# Patient Record
Sex: FEMALE | Race: WHITE | NOT HISPANIC OR LATINO | Employment: OTHER | URBAN - METROPOLITAN AREA
[De-identification: names, ages, dates, MRNs, and addresses within clinical notes are randomized per-mention and may not be internally consistent; named-entity substitution may affect disease eponyms.]

---

## 2018-08-17 DIAGNOSIS — D18.00 CAVERNOMA: Primary | ICD-10-CM

## 2018-08-24 ENCOUNTER — APPOINTMENT (OUTPATIENT)
Dept: LAB | Facility: CLINIC | Age: 63
End: 2018-08-24
Attending: NEUROLOGICAL SURGERY
Payer: COMMERCIAL

## 2018-08-24 DIAGNOSIS — D18.00 CAVERNOMA: ICD-10-CM

## 2018-08-24 LAB
BUN SERPL-MCNC: 8 MG/DL (ref 8–20)
CREAT SERPL-MCNC: 0.6 MG/DL (ref 0.6–1.1)
GFR SERPL CREATININE-BSD FRML MDRD: >60 ML/MIN/1.73M*2

## 2018-08-24 PROCEDURE — 82565 ASSAY OF CREATININE: CPT

## 2018-08-24 PROCEDURE — 36415 COLL VENOUS BLD VENIPUNCTURE: CPT

## 2018-08-24 PROCEDURE — 84520 ASSAY OF UREA NITROGEN: CPT

## 2018-08-28 ENCOUNTER — HOSPITAL ENCOUNTER (OUTPATIENT)
Dept: RADIOLOGY | Facility: CLINIC | Age: 63
Discharge: HOME | End: 2018-08-28
Attending: NEUROLOGICAL SURGERY
Payer: COMMERCIAL

## 2018-08-28 DIAGNOSIS — D18.00 CAVERNOMA: ICD-10-CM

## 2018-08-28 RX ORDER — GADOTERATE MEGLUMINE 376.9 MG/ML
10 INJECTION INTRAVENOUS ONCE
Status: COMPLETED | OUTPATIENT
Start: 2018-08-28 | End: 2018-08-28

## 2018-08-28 RX ADMIN — GADOTERATE MEGLUMINE 10 ML: 376.9 INJECTION INTRAVENOUS at 10:45

## 2018-08-30 ENCOUNTER — TELEPHONE (OUTPATIENT)
Dept: NEUROSURGERY | Facility: CLINIC | Age: 63
End: 2018-08-30

## 2018-08-30 DIAGNOSIS — D18.00 CAVERNOMA: Primary | ICD-10-CM

## 2018-08-30 NOTE — TELEPHONE ENCOUNTER
MRI brain with and without contrast reviewed with Dr. 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  Plan is to repeat MRI with and without contrast in 2 years (September 2020).  Called patient to discuss the results and plan, patient is aware and agreeable.  All questions answered.

## 2019-10-08 ENCOUNTER — PROCEDURE VISIT (OUTPATIENT)
Dept: OTOLARYNGOLOGY | Facility: CLINIC | Age: 64
End: 2019-10-08
Payer: COMMERCIAL

## 2019-10-08 ENCOUNTER — OFFICE VISIT (OUTPATIENT)
Dept: OTOLARYNGOLOGY | Facility: CLINIC | Age: 64
End: 2019-10-08
Payer: COMMERCIAL

## 2019-10-08 VITALS
SYSTOLIC BLOOD PRESSURE: 102 MMHG | WEIGHT: 103 LBS | HEART RATE: 67 BPM | HEIGHT: 66 IN | BODY MASS INDEX: 16.55 KG/M2 | OXYGEN SATURATION: 99 % | DIASTOLIC BLOOD PRESSURE: 68 MMHG

## 2019-10-08 DIAGNOSIS — H69.93 DYSFUNCTION OF BOTH EUSTACHIAN TUBES: Primary | ICD-10-CM

## 2019-10-08 DIAGNOSIS — H93.13 TINNITUS OF BOTH EARS: ICD-10-CM

## 2019-10-08 DIAGNOSIS — H90.3 SENSORINEURAL HEARING LOSS (SNHL) OF BOTH EARS: ICD-10-CM

## 2019-10-08 DIAGNOSIS — H93.13 SUBJECTIVE TINNITUS OF BOTH EARS: ICD-10-CM

## 2019-10-08 DIAGNOSIS — H90.3 SENSORINEURAL HEARING LOSS (SNHL) OF BOTH EARS: Primary | ICD-10-CM

## 2019-10-08 PROBLEM — J30.1 ACUTE SEASONAL ALLERGIC RHINITIS DUE TO POLLEN: Status: ACTIVE | Noted: 2017-09-08

## 2019-10-08 PROBLEM — K08.89 PAIN, DENTAL: Status: ACTIVE | Noted: 2017-09-08

## 2019-10-08 PROBLEM — R51.9 FACIAL PAIN, ACUTE: Status: RESOLVED | Noted: 2017-09-08 | Resolved: 2019-10-08

## 2019-10-08 PROBLEM — J32.0 CHRONIC MAXILLARY SINUSITIS: Status: ACTIVE | Noted: 2017-09-08

## 2019-10-08 PROBLEM — R51.9 FACIAL PAIN, ACUTE: Status: ACTIVE | Noted: 2017-09-08

## 2019-10-08 PROCEDURE — 99999 PR OFFICE/OUTPT VISIT,PROCEDURE ONLY: CPT | Performed by: AUDIOLOGIST

## 2019-10-08 PROCEDURE — 92567 TYMPANOMETRY: CPT | Performed by: AUDIOLOGIST

## 2019-10-08 PROCEDURE — 99213 OFFICE O/P EST LOW 20 MIN: CPT | Mod: 25 | Performed by: OTOLARYNGOLOGY

## 2019-10-08 PROCEDURE — 92557 COMPREHENSIVE HEARING TEST: CPT | Performed by: AUDIOLOGIST

## 2019-10-08 RX ORDER — AZELASTINE 1 MG/ML
1 SPRAY, METERED NASAL DAILY
Qty: 30 ML | Refills: 5 | Status: SHIPPED | OUTPATIENT
Start: 2019-10-08 | End: 2020-07-06

## 2019-10-08 ASSESSMENT — ENCOUNTER SYMPTOMS
SLEEP DISTURBANCE: 0
NERVOUS/ANXIOUS: 0
SINUS PRESSURE: 0
FEVER: 0
ADENOPATHY: 0
DYSPHORIC MOOD: 0
ARTHRALGIAS: 0
DIARRHEA: 0
CONSTIPATION: 0
TROUBLE SWALLOWING: 0
COUGH: 0
SHORTNESS OF BREATH: 0
NAUSEA: 0
MYALGIAS: 0
RHINORRHEA: 0
VOMITING: 0
BACK PAIN: 0
FREQUENCY: 0
DIZZINESS: 0
HEADACHES: 0
FATIGUE: 0
WHEEZING: 0
WEAKNESS: 0
POLYPHAGIA: 0
PALPITATIONS: 0
VOICE CHANGE: 0

## 2019-10-08 NOTE — PROGRESS NOTES
[facility]  [address], [address]  [place] [address]  Phone: [phone]  Fax: [phone]      Patient ID: Nohemy Machuca                              : 1955    Visit Date: 10/8/2019    Referring Provider: Paris Corcoran MD    Chief Complaint: Ear Fullness      Nohemy Machuca returned to the [place] office today in regard to fullness in the ears with associated tinnitus.    Nohemy has had aural fullness bilaterally on an intermittent basis for the past 6 months.  She has been involved in extensive dental work sequential placement of implants the entire oral cavity over the past 6 years.  Her bite has been moved significantly and she has manifested tension around and about the temporomandibular joints during this time.  The mid face has been pushed forward and this is obvious even in her appearance.  2 years ago she saw Dr. Darryl Lala who placed her on Elavil to help with severe tension at the temporomandibular joints caused by these dental interventions.  That medication worked and she no longer takes it.    Nohemy does have low-grade tinnitus along with the fullness in the ears.  She has had minimal postnasal drainage this fall.  She has not had an upper respiratory infection.  She did not have any trouble with airplane flights over the summer.    Review of Systems   Constitutional: Negative for fatigue and fever.   HENT: Positive for postnasal drip and tinnitus. Negative for congestion, hearing loss, nosebleeds, rhinorrhea, sinus pressure, trouble swallowing and voice change.         Facial pain.  Ear fullness.   Eyes: Negative for visual disturbance.   Respiratory: Negative for cough, shortness of breath and wheezing.    Cardiovascular: Negative for chest pain and palpitations.   Gastrointestinal: Negative for constipation, diarrhea, nausea and vomiting.   Endocrine: Negative for polyphagia and polyuria.   Genitourinary: Negative for frequency.   Musculoskeletal: Negative for  "arthralgias, back pain, gait problem and myalgias.   Skin: Negative for rash.   Allergic/Immunologic: Negative for environmental allergies.   Neurological: Negative for dizziness, weakness and headaches.   Hematological: Negative for adenopathy.   Psychiatric/Behavioral: Negative for dysphoric mood and sleep disturbance. The patient is not nervous/anxious.      Current Medications:Multivitamin.    Physical Exam:  Vitals: /68 (BP Location: Left upper arm, Patient Position: Sitting)   Pulse 67   Ht 1.664 m (5' 5.5\")   Wt 46.7 kg (103 lb)   SpO2 99%   BMI 16.88 kg/m²   General:  Well-developed but cachectic 64 y.o. who is very anxious.  Voice: Normal without hoarseness, breathiness or stridor.  Head/face:  No scars or lesions.  No parotid masses. No presinus tenderness. Seventh cranial nerves intact.  Eyes:  Extraocular movements and gaze alignment normal.  Ears: Auricles normal.  External auditory canals free of cerumen.  Tympanic membranes clear, mobile and without retraction or scar.  Nose:  Dorsum straight.  Septum sinusoidal.  Turbinates pale purple but normal in size and orientation.  No pus, polyps or crusts.  Oral Cavity/oropharynx: Normal tongue thrust. Normal gag reflex. No masses, leukoplakia, erythroplakia, ulcers or other abnormalities at the tongue, floor of mouth, buccal mucosa or palate.  Cobblestoning at the posterior pharyngeal wall.  No audible or palpable crepitus.  Tense across the TMJs.  Larynx/hypopharynx:  Normal supraglottis.  Vocal folds smooth and white.  Arytenoids sharp and mobile.  Interarytenoid and post-glottic mucosa normal.  No masses at the base of tongue, vallecula or piriform sinuses.  Neck:  No masses, adenopathy, cervical spasm or thyroid enlargement.  Cranial Nerves II through XII: Grossly intact.  Mental status: Awake, alert and oriented ×3.  No anxiety or depression.    Audiogram: Bilateral mild sensorineural hearing loss worse on the left than the right above 3000 " Hz.  Discrimination scores: 96% at 60 dB bilaterally.    Tympanogram: Normal bilaterally.    Impression:  1.  Bilateral eustachian tube dysfunction most likely relational to tension through the muscles of the temporomandibular joints.  The patient has gone through extensive dental work over the past 6 years.  2.  Bilateral high-frequency sensorineural hearing loss with associated tinnitus, made worse by eustachian tube irritability.  3.  Allergic rhinitis.  No evidence of sinusitis on previous CT scans and most recent MRI of the head.    Recommendations and Plan:  1.  Simply Saline nasal mist squirts to each nostril twice daily.  2.  Astelin, 1 squirt to each nostril each evening for 1 month.  This is an antihistamine that treats fall allergies and can also help with the eustachian tubes.  3.  When the ears feel full, massage the muscles under the the ears in the upper neck.  You can also use heat in this location and it will be helpful.  4.  Follow-up in 1 year or sooner as needed.        Gala Starkey MD

## 2019-10-08 NOTE — PATIENT INSTRUCTIONS
1.  Simply Saline nasal mist squirts to each nostril twice daily.  2.  Astelin, 1 squirt to each nostril each evening for 1 month.  This is an antihistamine that treats fall allergies and can also help with the eustachian tube.  3.  When the ears feel full, massage the muscles under the the ears in the upper neck.  You can also use heat in this location and it will be helpful.  4.  Follow-up in 1 year.

## 2019-11-08 ENCOUNTER — OFFICE VISIT (OUTPATIENT)
Dept: PRIMARY CARE | Facility: CLINIC | Age: 64
End: 2019-11-08
Payer: COMMERCIAL

## 2019-11-08 VITALS
HEIGHT: 65 IN | OXYGEN SATURATION: 99 % | BODY MASS INDEX: 17.33 KG/M2 | DIASTOLIC BLOOD PRESSURE: 70 MMHG | WEIGHT: 104 LBS | SYSTOLIC BLOOD PRESSURE: 98 MMHG | HEART RATE: 58 BPM | TEMPERATURE: 97.7 F

## 2019-11-08 DIAGNOSIS — Z12.11 SCREENING FOR COLORECTAL CANCER: ICD-10-CM

## 2019-11-08 DIAGNOSIS — H69.93 DYSFUNCTION OF BOTH EUSTACHIAN TUBES: ICD-10-CM

## 2019-11-08 DIAGNOSIS — G50.0 TRIGEMINAL NEURALGIA: ICD-10-CM

## 2019-11-08 DIAGNOSIS — L80 VITILIGO: ICD-10-CM

## 2019-11-08 DIAGNOSIS — Q28.3 CEREBRAL CAVERNOMA: ICD-10-CM

## 2019-11-08 DIAGNOSIS — Z00.00 ROUTINE GENERAL MEDICAL EXAMINATION AT A HEALTH CARE FACILITY: Primary | ICD-10-CM

## 2019-11-08 DIAGNOSIS — Z12.39 SCREENING FOR BREAST CANCER: ICD-10-CM

## 2019-11-08 DIAGNOSIS — H90.3 SENSORINEURAL HEARING LOSS (SNHL) OF BOTH EARS: ICD-10-CM

## 2019-11-08 DIAGNOSIS — Z12.12 SCREENING FOR COLORECTAL CANCER: ICD-10-CM

## 2019-11-08 DIAGNOSIS — M81.0 OSTEOPOROSIS WITHOUT CURRENT PATHOLOGICAL FRACTURE, UNSPECIFIED OSTEOPOROSIS TYPE: ICD-10-CM

## 2019-11-08 PROCEDURE — 90471 IMMUNIZATION ADMIN: CPT | Performed by: FAMILY MEDICINE

## 2019-11-08 PROCEDURE — 99396 PREV VISIT EST AGE 40-64: CPT | Mod: 25 | Performed by: FAMILY MEDICINE

## 2019-11-08 PROCEDURE — 90686 IIV4 VACC NO PRSV 0.5 ML IM: CPT | Performed by: FAMILY MEDICINE

## 2019-11-08 RX ORDER — CYCLOBENZAPRINE HCL 5 MG
5 TABLET ORAL 2 TIMES DAILY
Refills: 0 | COMMUNITY
Start: 2019-10-10 | End: 2020-12-22

## 2019-11-08 SDOH — HEALTH STABILITY: MENTAL HEALTH: HOW OFTEN DO YOU HAVE A DRINK CONTAINING ALCOHOL?: MONTHLY OR LESS

## 2019-11-08 NOTE — PROGRESS NOTES
"            OFFICE VISIT NOTE NEW    FEI PRECIADO DO        PATIENT NAME:Nohemy Machuca  PATIENT : 1955  AMANDA: 2019    CC:   Chief Complaint   Patient presents with   • Annual Exam     referral for mammo and labs     \"annual physical new\"         HPI   Nohemy Machuca is a 64 y.o. female  Working as a  [employer] living with  with incidental cerebral cavernoma, trigeminal neuralgia, osteoporosis presenting to review chronic conditions and here for annual physical evaluation.    #annual physical    HEALTH MAINTENANCE    -- Pneumonia Immunization: not DUE  -- Influenza Immunization: DUE  -- Shingles Immunization:shingles  -- Colorectal cancer screening:dr oleary   q 3 years? Due 2020  -- Breast Cancer screening: mammo  18 aubrey negative   -- Cervical cancer screening: DUE normal  q3-5 years  6/15/18   -- Bone Health screenin2017 +osteoporosis  -- Vision Screening:intact normal   -- Hearing Screening: ent hearing loss       #osteoporosis  DEXA 2017 +osteoporosis  +fam hx mother and M grandmother  Taking MVI  Taking vit D3 1000  Weight bearnig exercises- lifting and walking   + fracture left hand from shoveling     #cavernoma left thalamas, cerebral    found on MRI  For admission for trigeminal neurlagia   followup adelina borja  q2 years MRI w and WO  Thought to be 2/2 mild trauma young no overt issues now at this time. surveillance    #trigeminal neuralgia   on elavil 25 in past  Seen by neuro     #bl ETD and TMJ tensions  #bl high freq sensorineural hearing loss -- tinnitus   - seeing ent astelin nasal spray for allergic rhinitis      #vitiligo- chronic, father. Not seenig derm     SOCIAL HISTORY:  Denies etoh, drug, tobacco      FUNCTIONAL HISTORY:  ADL   Feeding-I  Toileting-I  Dressing-I  Bathing-I  Transferring-I    IADL  Medications-I  Shopping-I  Finances-I  Cooking-I  Cleaning-I        ADVANCED CARE PLANNING:  poa is     FULL " CODE    DEPRESSION/MOOD:  GEOVANNA:denies  PHQ9:denies  Mother  age 62 suicide       The following have been reviewed and updated as appropriate in this visit: active problem list, medication list, allergies, family history, social history, health maintenance            Past Medical History:   Diagnosis Date   • Osteoporosis        Past Surgical History:   Procedure Laterality Date   • BUNIONECTOMY Bilateral    • TONSILLECTOMY       Social History     Socioeconomic History   • Marital status:      Spouse name: Not on file   • Number of children: Not on file   • Years of education: Not on file   • Highest education level: Not on file   Occupational History   • Not on file   Social Needs   • Financial resource strain: Not on file   • Food insecurity:     Worry: Not on file     Inability: Not on file   • Transportation needs:     Medical: Not on file     Non-medical: Not on file   Tobacco Use   • Smoking status: Never Smoker   • Smokeless tobacco: Never Used   Substance and Sexual Activity   • Alcohol use: Yes     Frequency: Monthly or less     Comment: occ wine 3x per year   • Drug use: Not on file   • Sexual activity: Not on file   Lifestyle   • Physical activity:     Days per week: Not on file     Minutes per session: Not on file   • Stress: Not on file   Relationships   • Social connections:     Talks on phone: Not on file     Gets together: Not on file     Attends Christian service: Not on file     Active member of club or organization: Not on file     Attends meetings of clubs or organizations: Not on file     Relationship status: Not on file   • Intimate partner violence:     Fear of current or ex partner: Not on file     Emotionally abused: Not on file     Physically abused: Not on file     Forced sexual activity: Not on file   Other Topics Concern   • Not on file   Social History Narrative   • Not on file         Family History   Problem Relation Age of Onset   • Diverticulitis Biological Mother    •  "Glaucoma Biological Father          Allergies   Allergen Reactions   • No Known Allergies          Current Outpatient Medications   Medication Sig Dispense Refill   • azelastine (ASTELIN) 137 mcg (0.1 %) nasal spray Administer 1 spray into each nostril daily. 30 mL 5   • cyclobenzaprine (FLEXERIL) 5 mg tablet Take 5 mg by mouth 2 (two) times a day.  0   • multivitamin tablet Take by mouth daily.       No current facility-administered medications for this visit.        Review of Systems  ROS  See hpi.   General: Denies fatigue, weight loss or weight gain.    Head: Denies headaches trauma   Eyes: Denies blurry vision, diplopia, decreased vision.  Denies drainage or excessive tearing.  Ears: Denies tinnitis, decreased hearing, ear drainage  Nose: Denies rhinorrhea, epistaxi  Mouth: Denies dry mouth  Neck: Denies lumps, bumps.  Denies dysphagia, odynophagia  Pulmonary:  Denies shortness of breath, difficulty breathing, excessive drooling, change in sputum.   Cardiac: Denies chest pain, flip-flop sensation   GI/Abdomen: Denies vomit, nausea, diarrhea, constipation, hematochezia.  Denies abdominal pain.    Uro/: Denies hematuria, urgency, frequency, burning sensation with urination. Denies discharge.   Skin: Denies lumps, bumps, rash.   MSK:  Denies weakness, numbness, tingling.    Neuro: Denies confusion, vertigo.              VITALS  Vitals:    11/08/19 0814   BP: 98/70   Pulse: (!) 58   Temp: 36.5 °C (97.7 °F)   SpO2: 99%             Wt Readings from Last 3 Encounters:   11/08/19 47.2 kg (104 lb)   10/08/19 46.7 kg (103 lb)   08/28/18 50.8 kg (112 lb)       Ht Readings from Last 3 Encounters:   11/08/19 1.651 m (5' 5\")   10/08/19 1.664 m (5' 5.5\")       BP Readings from Last 3 Encounters:   11/08/19 98/70   10/08/19 102/68       Physical Exam  PHYSICAL EXAM  General: Appears well, in no distress. Pt is pleasant. Hygiene normal. Thin frame  Head: NC/AT.   Eyes: Conjunctiva and sclera normal bilaterally. No lid-lag.  " PERRL. EOMI.   Ears: No tenderness of external ears bilterally.  Tympanic membrane Intact bilaterally.  No cerumen impaction. No erythema of canals bilaterally.   Nose: Inferior turbinates normal bilaterally.   Mouth: Oral mucosa pink and moist.No erythema or edema of oral pharynx. No tonsillar exudates or hypertrophy. Uvula midline and without swelling.   Neck: Inspection normal. Trachea midline. supple, FROM without pain. No cervical lymphadenopathy.  No enlargement of thyroid.  Cardiac: regular, rate, and rhythm. No murmurs, rubs, or gallops.  Lungs: negative for respiratory distress with normal respiratory effort. Lungs clear to auscultation bilaterally. No wheezes, rales, or rhonchi. No intercostal retractions  Abdomen: +BS. Bowel sounds normal. Abdomen is soft, non-distended. No tenderness. No masses or organomegaly. No guarding.   Skin: warm and dry. No erythema or rash.  Neuro: CNII-XII intact.   Extremities: No peripheral edema or extremity lymphadenopathy  MSK: 5/5 UE and LE b/l. Gait stable and intact. Sitting Balance stable.   Psych: linear. Normal mood and affect.  No SI/HI. AAOX3.               PERTINENT LABS, IMAGING    No new labs.     Lab Results   Component Value Date    WBC 4.72 09/18/2017    HGB 13.7 09/18/2017    HCT 38.6 09/18/2017    MCV 93.9 09/18/2017     09/18/2017         Chemistry        Component Value Date/Time     (L) 09/18/2017 0532    K 4.5 09/18/2017 0532    CL 98 09/18/2017 0532    CO2 26 09/18/2017 0532    BUN 8 08/24/2018 1035    CREATININE 0.6 08/24/2018 1035        Component Value Date/Time    CALCIUM 9.5 09/18/2017 0532    ALKPHOS 51 09/17/2017 1206    AST 31 09/17/2017 1206    ALT 26 09/17/2017 1206    BILITOT 0.6 09/17/2017 1206            Lab Results   Component Value Date    CHOL 209 (H) 09/18/2017    CHOL 193 03/16/2017     Lab Results   Component Value Date    HDL 81 09/18/2017    HDL 76 03/16/2017     Lab Results   Component Value Date    LDLCALC 117 (H)  09/18/2017    LDLCALC 99 03/16/2017     Lab Results   Component Value Date    TRIG 54 09/18/2017    TRIG 91 03/16/2017     No results found for: CHOLHDL    Lab Results   Component Value Date    TSH 1.69 09/17/2017       No results found for: HGBA1C    No results found for: HAV, HEPAIGM, HEPBIGM, HEPBCAB, HBEAG, HEPCAB    No results found for: MICROALBUR, [id number]    No results found for this or any previous visit (from the past 24 hour(s)).    No results found.            Immunization History   Administered Date(s) Administered   • Influenza Vaccine Quadrivalent 3 Yr And Older 11/01/2016   • Influenza, Unspecified 11/01/2016, 11/15/2016   • Tdap 08/26/2015         Health Maintenance   Topic Date Due   • HIV Screening  08/11/1970   • Cervical Cancer Screening  08/11/1978   • Zoster Vaccine (1 of 2) 08/11/2007   • Colonoscopy  08/11/2007   • Mammogram  12/28/2017   • Influenza Vaccine (1) 08/01/2019   • DTaP, Tdap, and Td Vaccines (2 - Td) 08/26/2025   • Hepatitis C Screening  Completed   • Meningococcal ACWY  Aged Out   • Varicella Vaccines  Aged Out   • HIB Vaccines  Aged Out   • IPV Vaccines  Aged Out   • HPV Vaccines  Aged Out   • Pneumococcal  Aged Out            Diagnosis Plan   1. Routine general medical examination at a health care facility  Influenza vaccine quadrivalent preservative free 6 mon and older IM    Hemoglobin A1c    Comprehensive metabolic panel    CBC and Differential    Lipid panel    TSH 3rd Generation    Vitamin D 25 hydroxy    DEXA BONE DENSITY    Hemoglobin A1c    Comprehensive metabolic panel    CBC and Differential    Lipid panel    TSH 3rd Generation    Vitamin D 25 hydroxy   2. Osteoporosis without current pathological fracture, unspecified osteoporosis type  DEXA BONE DENSITY   3. Trigeminal neuralgia     4. Dysfunction of both eustachian tubes     5. Sensorineural hearing loss (SNHL) of both ears     6. Cerebral cavernoma     7. Vitiligo     8. Screening for breast cancer  BI  SCREENING MAMMOGRAM BILATERAL   9. Screening for colorectal cancer     10. BMI less than 19,adult           Nohemy Machuca is a 64 y.o. female  Working as a  [employer] living with  with incidental cerebral cavernoma, trigeminal neuralgia, osteoporosis presenting to review chronic conditions and here for annual physical evaluation.    #annual physical--stable return in 1 year  #BMI < 18   reviewed bmi goals and increased protein intake  Routine labs ordered   phq9-0  Sorin-7-0      HEALTH MAINTENANCE    -- Pneumonia Immunization: not DUE  -- Influenza Immunization: give today  -- Shingles Immunization:shingles  -- Colorectal cancer screening:dr oleary   q 3 years? Due 2020  -- Breast Cancer screening: mammo  18 aubrey n-egative / script given  -- Cervical cancer screening: DUE normal  q3-5 years  6/15/18 . See gyn across due   -- Bone Health screenin2017 +osteoporosis  / script given  -- Vision Screening:intact normal   -- Hearing Screening: ent hearing loss       #osteoporosis  DEXA 2017 +osteoporosis  +fam hx mother and M grandmother   + fracture left hand from shoveling   Taking MVI  Taking vit D3 1000  Ed and counseling and reviewed needed amounts of ca and vit D handout given to look at diet intake  Weight bearnig exercises- lifting and walking  Significant dental work and may need to see rheum or endo for osteoporosis management  dexa script given    #cavernoma left thalamas, cerebral    found on MRI  For admission for trigeminal neurlagia   followup adelina borja  q2 years MRI w and WO  Thought to be 2/2 mild trauma young no overt issues now at this time. surveillance    #trigeminal neuralgia   on elavil 25 in past  Seen by neuro   stable    #bl ETD and TMJ tensions  #bl high freq sensorineural hearing loss -- tinnitus   - seeing ent astelin nasal spray for allergic rhinitis  - she is not taking nasal spray regularly      #vitiligo- chronic, father. Not  seenig derm     CTM      Return in about 1 year (around 11/8/2020) for Followup with Dr. Ginger Gonzales.    Ginger Gonzales DO

## 2019-11-22 ENCOUNTER — TELEPHONE (OUTPATIENT)
Dept: PRIMARY CARE | Facility: CLINIC | Age: 64
End: 2019-11-22

## 2020-01-03 ENCOUNTER — TELEPHONE (OUTPATIENT)
Dept: NEUROSURGERY | Facility: CLINIC | Age: 65
End: 2020-01-03

## 2020-01-03 ENCOUNTER — TELEPHONE (OUTPATIENT)
Dept: OTOLARYNGOLOGY | Facility: CLINIC | Age: 65
End: 2020-01-03

## 2020-01-03 NOTE — TELEPHONE ENCOUNTER
Pt dentist (Dr.Mariana Gee [facility] ) that she get a CT scan on a consistent basis..Pt. States you and her spoke about this and wanted to know if you could write a statement for her letting the dentist know that it is not neccessary ? Please advise

## 2020-01-03 NOTE — TELEPHONE ENCOUNTER
Pt called stated that her dentist is pushing her for a CBCT scan and she does not want to get that done. She would like a letter stating that she does not need it done due to the radiation. She would like a call back to:    Nohemy Machuca ([facility]) [phone] (M)

## 2020-01-03 NOTE — TELEPHONE ENCOUNTER
We can not provide a letter regarding this. She should follow up with her PCP. Please let her know.

## 2020-01-03 NOTE — TELEPHONE ENCOUNTER
"I spoke to the patient directly.  Her dentist wants to actually continue aggressive dental work and do more of the dental CT scans.  I cannot comment on the necessity of these procedures.      The patient is upset because she says that her dentist \"runs rough shod over her and does not listen to her\" and describes herself as 'under the control of her dentist'.  I told her that perhaps the best thing to do would be to get a second dental opinion before undergoing more surgery.  I referred her to Dr. Abdiaziz Camara's group in [place].  "

## 2020-01-22 ENCOUNTER — HOSPITAL ENCOUNTER (OUTPATIENT)
Dept: RADIOLOGY | Age: 65
Discharge: HOME | End: 2020-01-22
Attending: FAMILY MEDICINE
Payer: COMMERCIAL

## 2020-01-22 DIAGNOSIS — Z12.39 SCREENING FOR BREAST CANCER: ICD-10-CM

## 2020-01-22 PROCEDURE — 77067 SCR MAMMO BI INCL CAD: CPT

## 2020-01-22 PROCEDURE — 77063 BREAST TOMOSYNTHESIS BI: CPT

## 2020-01-23 NOTE — RESULT ENCOUNTER NOTE
Results Reviewed:  Results in acceptable range.please let patient know that mammogram is benign and normal. followup mammo in 1 year

## 2020-01-30 ENCOUNTER — TELEPHONE (OUTPATIENT)
Dept: PRIMARY CARE | Facility: CLINIC | Age: 65
End: 2020-01-30

## 2020-01-30 NOTE — TELEPHONE ENCOUNTER
----- Message from Ginger Gonzales DO sent at 1/23/2020 12:41 PM EST -----  Results Reviewed:  Results in acceptable range.please let patient know that mammogram is benign and normal. followup mammo in 1 year

## 2020-07-06 ENCOUNTER — TELEPHONE (OUTPATIENT)
Dept: NEUROSURGERY | Facility: CLINIC | Age: 65
End: 2020-07-06

## 2020-07-06 RX ORDER — LORAZEPAM 0.5 MG/1
TABLET ORAL
Qty: 2 TABLET | Refills: 0 | Status: SHIPPED | OUTPATIENT
Start: 2020-07-06 | End: 2020-07-06

## 2020-07-06 NOTE — TELEPHONE ENCOUNTER
Spoke to patient and she explains that yesterday she began to have a a right sided headache, 3/10. She denies any other neurological symptoms including weakness, numbness, vision changes or difficulty with speech. I explained to her that she can get her MRI now but should do it as soon as possible. In the meantime, if her headache does not resolve, becomes worse or if she develops any other neurological symptoms she should come to the ED. She is calling now to schedule the MRI and I will follow up to make sure it is scheduled appropriately.

## 2020-07-06 NOTE — TELEPHONE ENCOUNTER
Pt calling bc the back of her head is hurting where she had cavernoma.  She wants to get MRI now rather than wait till August.  She is asking for a script for anxiety to undergo MRI be mailed to her home address.

## 2020-07-10 ENCOUNTER — HOSPITAL ENCOUNTER (OUTPATIENT)
Dept: RADIOLOGY | Facility: HOSPITAL | Age: 65
Discharge: HOME | End: 2020-07-10
Attending: PHYSICIAN ASSISTANT
Payer: COMMERCIAL

## 2020-07-10 DIAGNOSIS — D18.00 CAVERNOMA: ICD-10-CM

## 2020-07-10 PROCEDURE — A9585 GADOBUTROL INJECTION: HCPCS | Mod: JW | Performed by: PHYSICIAN ASSISTANT

## 2020-07-10 PROCEDURE — 70553 MRI BRAIN STEM W/O & W/DYE: CPT

## 2020-07-10 RX ORDER — GADOBUTROL 604.72 MG/ML
0.1 INJECTION INTRAVENOUS ONCE
Status: COMPLETED | OUTPATIENT
Start: 2020-07-10 | End: 2020-07-10

## 2020-07-10 RX ADMIN — GADOBUTROL 5 MMOL: 604.72 INJECTION INTRAVENOUS at 08:34

## 2020-07-13 ENCOUNTER — TELEPHONE (OUTPATIENT)
Dept: NEUROSURGERY | Facility: CLINIC | Age: 65
End: 2020-07-13

## 2020-07-13 DIAGNOSIS — D18.00 CAVERNOMA: Primary | ICD-10-CM

## 2020-07-13 NOTE — TELEPHONE ENCOUNTER
Went over her MRI with her. I let her know I would have Dr. Scales review tomorrow and get back to her with recommendations. She reports her headache is much improved.

## 2020-07-15 NOTE — TELEPHONE ENCOUNTER
Left message to call our office back.        (Dr. Scales reviewed their brain MRI which showed stability of the cavernomas.  We will plan for a brain MRI without in 2 years)

## 2020-07-16 NOTE — TELEPHONE ENCOUNTER
Dr. Scales reviewed their brain MRI which showed stability of the cavernomas.  We will plan for a brain MRI without in 2 years. Patient aware and order placed and mailed.

## 2020-09-08 ENCOUNTER — TELEPHONE (OUTPATIENT)
Dept: PRIMARY CARE | Facility: CLINIC | Age: 65
End: 2020-09-08

## 2020-09-08 NOTE — TELEPHONE ENCOUNTER
Pt does not want to schedule an apt but would like a call back from the dr with a recommendation on who she could see to have a cyst on her left wrist removed

## 2020-09-18 ENCOUNTER — TRANSCRIBE ORDERS (OUTPATIENT)
Dept: REGISTRATION | Facility: HOSPITAL | Age: 65
End: 2020-09-18

## 2020-09-18 ENCOUNTER — APPOINTMENT (OUTPATIENT)
Dept: LAB | Facility: HOSPITAL | Age: 65
End: 2020-09-18
Attending: ORTHOPAEDIC SURGERY
Payer: COMMERCIAL

## 2020-09-18 ENCOUNTER — HOSPITAL ENCOUNTER (OUTPATIENT)
Dept: CARDIOLOGY | Facility: HOSPITAL | Age: 65
Discharge: HOME | End: 2020-09-18
Attending: ORTHOPAEDIC SURGERY
Payer: COMMERCIAL

## 2020-09-18 DIAGNOSIS — Z01.812 ENCOUNTER FOR PREPROCEDURAL LABORATORY EXAMINATION: ICD-10-CM

## 2020-09-18 DIAGNOSIS — Z11.59 ENCOUNTER FOR SCREENING FOR OTHER VIRAL DISEASES: ICD-10-CM

## 2020-09-18 DIAGNOSIS — Z11.59 ENCOUNTER FOR SCREENING FOR OTHER VIRAL DISEASES: Primary | ICD-10-CM

## 2020-09-18 LAB
ANION GAP SERPL CALC-SCNC: 6 MEQ/L (ref 3–15)
BUN SERPL-MCNC: 13 MG/DL (ref 8–20)
CALCIUM SERPL-MCNC: 10 MG/DL (ref 8.9–10.3)
CHLORIDE SERPL-SCNC: 99 MEQ/L (ref 98–109)
CO2 SERPL-SCNC: 26 MEQ/L (ref 22–32)
CREAT SERPL-MCNC: 0.6 MG/DL (ref 0.6–1.1)
ERYTHROCYTE [DISTWIDTH] IN BLOOD BY AUTOMATED COUNT: 12.6 % (ref 11.7–14.4)
GFR SERPL CREATININE-BSD FRML MDRD: >60 ML/MIN/1.73M*2
GLUCOSE SERPL-MCNC: 87 MG/DL (ref 70–99)
HCT VFR BLDCO AUTO: 38.5 % (ref 35–45)
HGB BLD-MCNC: 13 G/DL (ref 11.8–15.7)
MCH RBC QN AUTO: 33.7 PG (ref 28–33.2)
MCHC RBC AUTO-ENTMCNC: 33.8 G/DL (ref 32.2–35.5)
MCV RBC AUTO: 99.7 FL (ref 83–98)
PDW BLD AUTO: 11.4 FL (ref 9.4–12.3)
PLATELET # BLD AUTO: 193 K/UL (ref 150–369)
POTASSIUM SERPL-SCNC: 5.2 MEQ/L (ref 3.6–5.1)
RBC # BLD AUTO: 3.86 M/UL (ref 3.93–5.22)
SODIUM SERPL-SCNC: 131 MEQ/L (ref 136–144)
WBC # BLD AUTO: 6.02 K/UL (ref 3.8–10.5)

## 2020-09-18 PROCEDURE — 93005 ELECTROCARDIOGRAM TRACING: CPT

## 2020-09-18 PROCEDURE — 36415 COLL VENOUS BLD VENIPUNCTURE: CPT

## 2020-09-18 PROCEDURE — 85027 COMPLETE CBC AUTOMATED: CPT

## 2020-09-18 PROCEDURE — 80048 BASIC METABOLIC PNL TOTAL CA: CPT

## 2020-09-19 LAB
ATRIAL RATE: 52
P AXIS: 55
PR INTERVAL: 120
QRS DURATION: 78
QT INTERVAL: 396
QTC CALCULATION(BAZETT): 368
R AXIS: 69
T WAVE AXIS: 69
VENTRICULAR RATE: 52

## 2020-10-13 ENCOUNTER — OFFICE VISIT (OUTPATIENT)
Dept: OTOLARYNGOLOGY | Facility: CLINIC | Age: 65
End: 2020-10-13
Payer: COMMERCIAL

## 2020-10-13 ENCOUNTER — PROCEDURE VISIT (OUTPATIENT)
Dept: OTOLARYNGOLOGY | Facility: CLINIC | Age: 65
End: 2020-10-13
Payer: COMMERCIAL

## 2020-10-13 VITALS
OXYGEN SATURATION: 98 % | HEIGHT: 65 IN | SYSTOLIC BLOOD PRESSURE: 140 MMHG | WEIGHT: 101 LBS | HEART RATE: 58 BPM | TEMPERATURE: 97.4 F | BODY MASS INDEX: 16.83 KG/M2 | DIASTOLIC BLOOD PRESSURE: 84 MMHG | RESPIRATION RATE: 18 BRPM

## 2020-10-13 DIAGNOSIS — H93.13 TINNITUS OF BOTH EARS: Primary | ICD-10-CM

## 2020-10-13 DIAGNOSIS — K08.89 PAIN, DENTAL: ICD-10-CM

## 2020-10-13 DIAGNOSIS — H93.13 SUBJECTIVE TINNITUS OF BOTH EARS: ICD-10-CM

## 2020-10-13 DIAGNOSIS — J31.0 CHRONIC RHINITIS: ICD-10-CM

## 2020-10-13 DIAGNOSIS — H90.3 SENSORINEURAL HEARING LOSS (SNHL) OF BOTH EARS: ICD-10-CM

## 2020-10-13 DIAGNOSIS — M26.622 ARTHRALGIA OF LEFT TEMPOROMANDIBULAR JOINT: ICD-10-CM

## 2020-10-13 DIAGNOSIS — H69.93 DYSFUNCTION OF BOTH EUSTACHIAN TUBES: ICD-10-CM

## 2020-10-13 DIAGNOSIS — H90.3 SENSORINEURAL HEARING LOSS (SNHL) OF BOTH EARS: Primary | ICD-10-CM

## 2020-10-13 PROBLEM — J30.1 ACUTE SEASONAL ALLERGIC RHINITIS DUE TO POLLEN: Status: RESOLVED | Noted: 2017-09-08 | Resolved: 2020-10-13

## 2020-10-13 PROCEDURE — 92557 COMPREHENSIVE HEARING TEST: CPT | Performed by: AUDIOLOGIST

## 2020-10-13 PROCEDURE — 99999 PR OFFICE/OUTPT VISIT,PROCEDURE ONLY: CPT | Performed by: AUDIOLOGIST

## 2020-10-13 PROCEDURE — 99213 OFFICE O/P EST LOW 20 MIN: CPT | Mod: 25 | Performed by: OTOLARYNGOLOGY

## 2020-10-13 PROCEDURE — 92567 TYMPANOMETRY: CPT | Performed by: AUDIOLOGIST

## 2020-10-13 RX ORDER — AZELASTINE 1 MG/ML
1 SPRAY, METERED NASAL 2 TIMES DAILY
COMMUNITY
End: 2020-10-19 | Stop reason: SDUPTHER

## 2020-10-13 RX ORDER — LORAZEPAM 0.5 MG/1
TABLET ORAL
COMMUNITY
Start: 2020-07-06 | End: 2020-10-13 | Stop reason: ALTCHOICE

## 2020-10-13 ASSESSMENT — ENCOUNTER SYMPTOMS
WHEEZING: 0
CONSTIPATION: 0
NAUSEA: 0
MYALGIAS: 0
PALPITATIONS: 0
FREQUENCY: 0
DIZZINESS: 0
VOICE CHANGE: 0
WEAKNESS: 0
ARTHRALGIAS: 0
RHINORRHEA: 0
TROUBLE SWALLOWING: 0
NERVOUS/ANXIOUS: 0
COUGH: 0
FEVER: 0
SHORTNESS OF BREATH: 0
FATIGUE: 0
VOMITING: 0
BACK PAIN: 0
POLYPHAGIA: 0
SINUS PRESSURE: 0
DIARRHEA: 0
ADENOPATHY: 0
SLEEP DISTURBANCE: 0
DYSPHORIC MOOD: 0
HEADACHES: 0

## 2020-10-13 NOTE — PROGRESS NOTES
[facility]  [address], [address]  [place] [address]  Phone: [phone]  Fax: [phone]      Patient ID: Nohemy Machuca                              : 1955    Visit Date: 10/13/2020  Referring Provider: Ginger Gonzales DO    Chief Complaint: Hearing Loss, Eustachian Tube Dysfunction, and Tinnitus      Nohemy Machuca returned to the [place] office today in regard to her history of eustachian tube dysfunction which relates to problems with the temporomandibular joints, allergic rhinitis and high-frequency sensorineural hearing loss with tinnitus.    I saw Nohemy 1 year ago and had recommended saline nasal spray Astelin and gentle massage for the temporomandibular joints.  Nohemy did well until she developed another problem with her teeth.  Her dentist identified malocclusion and suggested that she allow him to file down her teeth to make him more even.  Unfortunately, in the process of doing this, he disrupted a maxillary implant which needed to be removed.  He then stripped the screw of that implant.  An oral surgeon was unable to remove it.  She will now be having a bridge fitted over the gap where the implant used to be.  In the process of all this dental work and the anxiety that it caused, she has developed buzzing tinnitus again in her ears.  She recently placed herself back on Astelin thinking that it might help and it actually did.  She now wants to know what to do going forward.  She has more dental work to do beginning with the bridge placement 48 hours from now.  She is very nervous about the prospect of more dental work.    Nohemy has minimal postnasal drip.  She is breathing easily through the nose.  She does not have dental pain or ear pain today.    Review of Systems   Constitutional: Negative for fatigue and fever.   HENT: Positive for hearing loss and tinnitus. Negative for congestion, nosebleeds, postnasal drip, rhinorrhea, sinus pressure, trouble swallowing and  "voice change.         Eustachian tube dysfunction.   Eyes: Negative for visual disturbance.   Respiratory: Negative for cough, shortness of breath and wheezing.    Cardiovascular: Negative for chest pain and palpitations.   Gastrointestinal: Negative for constipation, diarrhea, nausea and vomiting.   Endocrine: Negative for polyphagia and polyuria.   Genitourinary: Negative for frequency.   Musculoskeletal: Negative for arthralgias, back pain, gait problem and myalgias.   Skin: Negative for rash.   Allergic/Immunologic: Negative for environmental allergies.   Neurological: Negative for dizziness, weakness and headaches.   Hematological: Negative for adenopathy.   Psychiatric/Behavioral: Negative for dysphoric mood and sleep disturbance. The patient is not nervous/anxious.        Current Medications: Astelin, cyclobenzaprine and multivitamin.    Physical Exam:  Vitals:   Visit Vitals  /84   Pulse (!) 58   Temp 36.3 °C (97.4 °F) (Oral)   Resp 18   Ht 1.651 m (5' 5\")   Wt 45.8 kg (101 lb)   SpO2 98%   BMI 16.81 kg/m²     General:  Well-developed, very thin 65 y.o. who is anxious.  Voice: Normal without hoarseness, breathiness or stridor.  Head/face:  No scars or lesions.  No parotid masses. No presinus tenderness. Seventh cranial nerves intact.  Eyes:  Extraocular movements and gaze alignment normal.  Ears: Auricles normal.  External auditory canals free of cerumen.  Tympanic membranes clear, mobile and without retraction or scar.  Nose:  Dorsum straight.  Septum nearly midline with a spur at the floor of the nose on the left.  Turbinates purple, normal in size and orientation.  No pus, polyps or crusts.  Mucosa is dry.  Oral Cavity/oropharynx: Normal tongue thrust. Normal gag reflex. No masses, leukoplakia, erythroplakia, ulcers or other abnormalities at the tongue, floor of mouth, buccal mucosa, palate or posterior pharyngeal wall.  Larynx/hypopharynx: Examination deferred due to the pandemic.  Neck:  No " masses, adenopathy, cervical spasm or thyroid enlargement.  Cranial Nerves II through XII: Grossly intact.  Mental status: Awake, alert and oriented ×3.  Anxious.    Audiogram: Nearly symmetrical mild to moderate sensorineural hearing loss above 3000 Hz bilaterally.  Discrimination scores: 96% at 60 dB bilaterally.  This represents little change since last year.    Tympanogram: Normal bilaterally.      Impression:  1.  Exacerbation of tinnitus due to TMJ irritability and recent dental work.  2.  Bilateral high-frequency sensorineural hearing loss with associated tinnitus.  3.  Chronic/allergic rhinitis.  4.  Rhinitis sicca.  5.  Anxiety which further exacerbates the tinnitus.    Recommendations and Plan:  1.  Simply Saline, 2 squirts to each nostril twice daily.  2.  Afrin 12-hour decongestant, 1 squirt to each nostril today and tomorrow a.m. and p.m.  3.  Resume Astelin on the day after the dental procedure, use it twice daily after the saline for 2 weeks and then decrease the dose to nighttime only.  Stay on the saline twice daily.  4.  Ayr saline gel, apply into the nostrils with a Q-tip at bedtime.  5.  If the nose becomes very dry while using Astelin routinely for a while, try Ponaris nasal emollient, 2 drops to each nostril daily.  This is available on [employer].  6.  You have hearing loss for which hearing aids are not yet needed.  7.  Follow-up in 1 year.        Gala Starkey MD

## 2020-10-13 NOTE — PATIENT INSTRUCTIONS
1.  Simply Saline, 2 squirts to each nostril twice daily.  2.  Afrin 12-hour decongestant, 1 squirt to each nostril today and tomorrow a.m. and p.m.  3.  Resume Astelin on the day after the dental procedure, use it twice daily after the saline for 2 weeks and then decrease the dose to nighttime only.  Stay on the saline twice daily.  4.  Ayr saline gel, apply into the nostrils with a Q-tip at bedtime.  5.  If the nose becomes very dry while using Astelin routinely for a while, try Ponaris nasal emollient, 2 drops to each nostril daily.  This is available on [employer].  6.  You have hearing loss for which hearing aids are not yet needed.  7.  Good luck with the dental work.  8.  Follow-up in 1 year.

## 2020-10-14 ENCOUNTER — TELEPHONE (OUTPATIENT)
Dept: OTOLARYNGOLOGY | Facility: CLINIC | Age: 65
End: 2020-10-14

## 2020-10-14 NOTE — TELEPHONE ENCOUNTER
Please let her know that that I reviewed Dr Starkey's notes from her visit. sometimes afrin can cause a burning sensation.  I recommend she stop the afrin.  Continue astelin twice a day and saline as needed.  The buzzing is partially from her hearing loss and partially from anxiety, try some distracting music or noise like having a fan on.

## 2020-10-14 NOTE — TELEPHONE ENCOUNTER
"Pt used the Afrin as you instructed; after using it she experienced burning in her nose and throat that lasted 5 hours.  She also shared that the \"buzzing\" in her ear has gotten worse.  She mentioned she used the Astelin this morning which didn't help.  Please advise, thank you!  "

## 2020-10-15 NOTE — TELEPHONE ENCOUNTER
Tonya spoke with pt and informed her of this.  Pt disagreed with response and would like to speak with you directly.

## 2020-10-16 PROCEDURE — 88304 TISSUE EXAM BY PATHOLOGIST: CPT | Performed by: ORTHOPAEDIC SURGERY

## 2020-10-19 ENCOUNTER — LAB REQUISITION (OUTPATIENT)
Dept: LAB | Facility: HOSPITAL | Age: 65
End: 2020-10-19
Attending: ORTHOPAEDIC SURGERY

## 2020-10-19 ENCOUNTER — TELEPHONE (OUTPATIENT)
Dept: OTOLARYNGOLOGY | Facility: CLINIC | Age: 65
End: 2020-10-19

## 2020-10-19 DIAGNOSIS — D21.12 BENIGN NEOPLASM OF CONNECTIVE AND OTHER SOFT TISSUE OF LEFT UPPER LIMB, INCLUDING SHOULDER: ICD-10-CM

## 2020-10-19 RX ORDER — AZELASTINE 1 MG/ML
1 SPRAY, METERED NASAL 2 TIMES DAILY
Qty: 30 ML | Refills: 6 | Status: SHIPPED | OUTPATIENT
Start: 2020-10-19 | End: 2020-10-26 | Stop reason: ALTCHOICE

## 2020-10-19 NOTE — TELEPHONE ENCOUNTER
That prescription went in electronically to the [facility] on [facility] on October 13, 2020.  It is in our system.  I sent it again.    She should also use saline nasal spray 2 squirts to each nostril prior to putting this medicine in her nose.

## 2020-10-19 NOTE — TELEPHONE ENCOUNTER
Pt states the script for Astelin was not called into her [facility] pharmacy [phone]

## 2020-10-20 LAB
CASE RPRT: NORMAL
CLINICAL INFO: NORMAL
PATH REPORT.FINAL DX SPEC: NORMAL
PATH REPORT.GROSS SPEC: NORMAL

## 2020-10-26 ENCOUNTER — TELEPHONE (OUTPATIENT)
Dept: NEUROSURGERY | Facility: CLINIC | Age: 65
End: 2020-10-26

## 2020-10-26 ENCOUNTER — OFFICE VISIT (OUTPATIENT)
Dept: PRIMARY CARE | Facility: CLINIC | Age: 65
End: 2020-10-26
Payer: COMMERCIAL

## 2020-10-26 ENCOUNTER — TELEPHONE (OUTPATIENT)
Dept: PRIMARY CARE | Facility: CLINIC | Age: 65
End: 2020-10-26

## 2020-10-26 VITALS
OXYGEN SATURATION: 95 % | WEIGHT: 98 LBS | RESPIRATION RATE: 16 BRPM | BODY MASS INDEX: 16.33 KG/M2 | SYSTOLIC BLOOD PRESSURE: 104 MMHG | HEIGHT: 65 IN | DIASTOLIC BLOOD PRESSURE: 60 MMHG | TEMPERATURE: 97.9 F | HEART RATE: 90 BPM

## 2020-10-26 DIAGNOSIS — G50.0 TRIGEMINAL NEURALGIA: Primary | ICD-10-CM

## 2020-10-26 PROBLEM — K08.89 PAIN, DENTAL: Status: RESOLVED | Noted: 2017-09-08 | Resolved: 2020-10-26

## 2020-10-26 PROBLEM — H69.93 DYSFUNCTION OF BOTH EUSTACHIAN TUBES: Chronic | Status: ACTIVE | Noted: 2019-11-08

## 2020-10-26 PROBLEM — M81.0 OSTEOPOROSIS WITHOUT CURRENT PATHOLOGICAL FRACTURE: Chronic | Status: ACTIVE | Noted: 2019-11-08

## 2020-10-26 PROBLEM — Q28.3 CEREBRAL CAVERNOMA: Chronic | Status: ACTIVE | Noted: 2019-11-08

## 2020-10-26 PROBLEM — J32.0 CHRONIC MAXILLARY SINUSITIS: Chronic | Status: ACTIVE | Noted: 2017-09-08

## 2020-10-26 PROCEDURE — 99214 OFFICE O/P EST MOD 30 MIN: CPT | Performed by: NURSE PRACTITIONER

## 2020-10-26 RX ORDER — GABAPENTIN 100 MG/1
CAPSULE ORAL
Qty: 9 CAPSULE | Refills: 0 | Status: SHIPPED | OUTPATIENT
Start: 2020-10-26 | End: 2020-12-21

## 2020-10-26 RX ORDER — PREDNISONE 10 MG/1
TABLET ORAL
COMMUNITY
Start: 2020-10-20 | End: 2020-11-13

## 2020-10-26 RX ORDER — GABAPENTIN 300 MG/1
300 CAPSULE ORAL NIGHTLY
Qty: 30 CAPSULE | Refills: 0 | Status: SHIPPED | OUTPATIENT
Start: 2020-10-26 | End: 2020-12-21

## 2020-10-26 ASSESSMENT — ENCOUNTER SYMPTOMS
BRUISES/BLEEDS EASILY: 0
NECK PAIN: 0
LIGHT-HEADEDNESS: 0
COLOR CHANGE: 0
SLEEP DISTURBANCE: 0
ABDOMINAL PAIN: 0
ADENOPATHY: 0
FEVER: 0
CHEST TIGHTNESS: 0
DIZZINESS: 0
DIARRHEA: 0
BACK PAIN: 0
DIFFICULTY URINATING: 0
RHINORRHEA: 0
MYALGIAS: 0
TREMORS: 0
SORE THROAT: 0
SINUS PAIN: 0
APPETITE CHANGE: 0
WEAKNESS: 0
TROUBLE SWALLOWING: 0
NERVOUS/ANXIOUS: 0
ARTHRALGIAS: 0
NUMBNESS: 0
COUGH: 0
CHILLS: 0
NAUSEA: 0
VOICE CHANGE: 0
SINUS PRESSURE: 0
HEADACHES: 0
PALPITATIONS: 0
WHEEZING: 0
SHORTNESS OF BREATH: 0
STRIDOR: 0
VOMITING: 0
ACTIVITY CHANGE: 0
DYSURIA: 0
FACIAL SWELLING: 0
CONSTIPATION: 0
PHOTOPHOBIA: 0
FATIGUE: 0

## 2020-10-26 NOTE — TELEPHONE ENCOUNTER
Fahad Branch, this is a little confusing. I would recommend a neurology appointment due for symptoms since she has been primarily managed by their group. I don't feel comfortable restarting any meds because it's totally unclear what they were treating for (trigeminal neuralagia I assume). I don't recommend that she take any more medications until she discusses w/ neurology. Does she want to be seen at our office? We can at least make sure neuro exam is stable and if all is stable, then we could refill gabapentin until neuro is able to see her on Thursday. The other option is to set her up with another neurology group in the system, but achieving a same-day as a new patient will be virtually impossible. The last option would be ER evaluation. I also CC'd Chantel to see if she could contact patient's PCP to see if she has any further recommendations.

## 2020-10-26 NOTE — TELEPHONE ENCOUNTER
"Phone triage call  Duong notified me that pt called in stating that she needs to see a Neurologist today (can be Telemed) because she took her Amitriptyline and she passed out, so she had to see someone today; also stated that she ran pout of her gabapentin so went back to taking her Amitriptyline.    Spoke with pt who reported that years ago (2018) she was seeing Neurologist, Dr Damon @ [facility] (# [phone]) (has not seen Neurologist since) for \"buzzing in sissy head\" and dx with Trigeminal Neuralgia and was prescribed Amitriptyline 25 mg BID, then she had episode of LOC after taking it, so Dr Damon changed her to Gabapentin 300 mg qd, then she ran out of the Gabapentin and had not seen the Neurologist again (all in 2018), so did not take any more of these 2 meds since then, until recently: last week the Dentist tried to retrieve an implant from her tooth that took 4 hours and then pt started having the \"buzzing in her head\" again, so since she didn't have any Gabapentin left (from 2018), she took Amitriptyline 50 mg on Saturday night (that she had left over from 2018), she then got up with  Saturday morning @ 4:40 am (he was going hunting) and she passed out and fell with no apparent injuries, per pt, witnessed by her , which she stated only lasted seconds and she stated was due to the Amitriptyline like before, so later she called [facility] and was told that Dr Damon had left the practice and they gave her an appt for Thursday, 10/29/20 @ 8 am, but the buzzing in her head is driving her crazy and she needs medication for it, and her friends told her to call her PCP because your PCP can get her a sooner appt (maybe even a Telemed appt) with the Neurologist, or a Telemed appt at PCP office, so she was calling so Dr Gonzales can get her a sooner appt, because she stated, \"I have to have an appt with a Neurologist before tonight, this is driving me " "patricia\".     I called [facility] 3 times to see if they could give pt an earlier appt for today, or a Telemed appt today, but got a busy signal 3 times.    Dr Gonzales is not available today, so will route to another practicioner.     Routed to Elly: please advise.      ------------------------------------------------------------------------  [facility], [facility] OFFICE BUILDING  [address] [address]  TEL [phone]  FAX [phone]  OFFICE HOURS : MONDAY - FRIDAY 8AM - 5PM          "

## 2020-10-26 NOTE — TELEPHONE ENCOUNTER
Pt would like a call back to discuss Trigeminal neuralgia she believes developed after dental work.     Nohemy Machuca ([facility]) [phone] (H)

## 2020-10-26 NOTE — PROGRESS NOTES
Elly Owens, VAIBHAV, CRNP, FNP-BC  [facility]  [address], [address]  [place] [address]  Phone: [phone]  Fax: [phone]     History of Present Illness/Chief Complaint     66 yo female patient of Dr. Christian greene, new to me, presenting today for trigeminal neuralgia.     Trigeminal Neuralgia  • Has a history of TN diagnosed in 7546-8247. This occurred initially after a dental procedure.   • Was started on amitriptyline for symptoms and care after full w/u was completed - did not tolerate well. Resulted in lightheadedness and syncopal episodes, so patient was changed over to gabapentin 300 mg HS. She states she started this dose right off the bat, no gradual titration.   • Was being followed by neuro.   • Had not flared since that time, so patient d/c'd follow-up.   • On 10/14/20, patient had another dental procedure which resulted in complications and a 4-hour procedure. Shortly after, began feeling a flare of pre-existing TN.  • Had tried to self-medicate w/ leftover helen, ran out, and then restarted herself on Elavil despite past history of side effects and intolerance.   • Had a syncopal episode on Saturday r/t med use, which was witnessed by her .  confirms no head-strike. LOC lasted a few seconds with no memory deficits/amnesia or AMS.   • Patient tried to sched appointment with neuro, but not able to see patient until Thursday.   • Patient encouraged to sched appointment here for helen RF prior to neuro appointment and confirms she will keep neuro appointment.   • Also had cavernoma follow-up MRI in 07/2020 which was considered to be stable on that exam.   • She reports facial pain with chewing, hot/cold foods, tactile touch at times. Describes it as a sharp, shooting pain and can affect both sides of the face, although worse on the left and never B/L simultaneously. Denies hearing loss or tinnitus, vision changes, facial weakness or droop,  headaches, cognitive or memory changes, difficulty swallowing or breathing, skin changes, etc. Denies facial n/t or paresthesias.      Past Medical Hx - Surgical Hx - Family Hx - Social Hx     The following have been reviewed and updated as appropriate in this visit:  Allergies  Meds  Problems         PMH  Past Medical History:   Diagnosis Date   • Osteoporosis        PSH  Past Surgical History:   Procedure Laterality Date   • BUNIONECTOMY Bilateral    • TONSILLECTOMY         PFMH  Family History   Problem Relation Age of Onset   • Diverticulitis Biological Mother    • Glaucoma Biological Father        SOCIAL HX  Social History     Socioeconomic History   • Marital status:      Spouse name: Not on file   • Number of children: Not on file   • Years of education: Not on file   • Highest education level: Not on file   Occupational History   • Not on file   Social Needs   • Financial resource strain: Not on file   • Food insecurity     Worry: Not on file     Inability: Not on file   • Transportation needs     Medical: Not on file     Non-medical: Not on file   Tobacco Use   • Smoking status: Never Smoker   • Smokeless tobacco: Never Used   Substance and Sexual Activity   • Alcohol use: Yes     Frequency: Monthly or less     Comment: occ wine 3x per year   • Drug use: Not on file   • Sexual activity: Not on file   Lifestyle   • Physical activity     Days per week: Not on file     Minutes per session: Not on file   • Stress: Not on file   Relationships   • Social connections     Talks on phone: Not on file     Gets together: Not on file     Attends Confucianist service: Not on file     Active member of club or organization: Not on file     Attends meetings of clubs or organizations: Not on file     Relationship status: Not on file   • Intimate partner violence     Fear of current or ex partner: Not on file     Emotionally abused: Not on file     Physically abused: Not on file     Forced sexual activity: Not on file  "  Other Topics Concern   • Not on file   Social History Narrative   • Not on file        Allergies & Medications     ALLERGIES  Allergies   Allergen Reactions   • No Known Allergies        CURRENT MEDS  Current Outpatient Medications   Medication Sig Dispense Refill   • multivitamin tablet Take by mouth daily.     • predniSONE (DELTASONE) 10 mg tablet TAKE 4 TAB DAILY FOR 4 DAYS THEN 2 TAB DAILY FOR 4 DAYS THEN 1 TA...  (REFER TO PRESCRIPTION NOTES).     • cyclobenzaprine (FLEXERIL) 5 mg tablet Take 5 mg by mouth 2 (two) times a day.  0   • gabapentin (NEURONTIN) 100 mg capsule Take 1 tablet (100 mg) nightly for 3 days. Then take 2 tablets (200 mg) nightly for 3 days. Then increase up to the 300 mg dose nightly if well-tolerated. 9 capsule 0   • gabapentin (NEURONTIN) 300 mg capsule Take 1 capsule (300 mg total) by mouth nightly. 30 capsule 0     No current facility-administered medications for this visit.         Review of Systems     Review of Systems   Constitutional: Negative for activity change, appetite change, chills, fatigue and fever.   HENT: Positive for dental problem. Negative for congestion, drooling, ear discharge, ear pain, facial swelling, hearing loss, mouth sores, nosebleeds, postnasal drip, rhinorrhea, sinus pressure, sinus pain, sneezing, sore throat, tinnitus, trouble swallowing and voice change.         10/14: Had dental procedure that triggered history of trigeminal neuralgia which first developed in [id number] after a different dental procedure. States the neuralgia is characterized by sharp shooting pain and is located mostly on the L side of the face. Feels a \"buzzing\" inside the head which has been c/w her history of TN in the past. This \"buzzing\" sensation is more pronounced with this flare, but the pain is not as bad as the last flare. Denies n/t at face. No facial weakness.    Eyes: Negative for photophobia and visual disturbance.   Respiratory: Negative for cough, chest tightness, " "shortness of breath, wheezing and stridor.    Cardiovascular: Negative for chest pain, palpitations and leg swelling.   Gastrointestinal: Negative for abdominal pain, constipation, diarrhea, nausea and vomiting.   Genitourinary: Negative for difficulty urinating, dysuria, menstrual problem and urgency.   Musculoskeletal: Negative for arthralgias, back pain, myalgias and neck pain.   Skin: Negative for color change and rash.   Neurological: Negative for dizziness, tremors, syncope, weakness, light-headedness, numbness and headaches.        Had an episode of lightheadedness r/t use of amitriptyline which has caused the same adverse effects for the patient in the past. She is not using this medication anymore. Patient reports this was witnessed by her  and confirms patient did not hit her head.    Hematological: Negative for adenopathy. Does not bruise/bleed easily.   Psychiatric/Behavioral: Negative for sleep disturbance. The patient is not nervous/anxious.          Physical Examination     TODAY'S VITALS  Vitals:    10/26/20 1554   BP: 104/60   BP Location: Right upper arm   Patient Position: Sitting   Pulse: 90   Resp: 16   Temp: 36.6 °C (97.9 °F)   TempSrc: Oral   SpO2: 95%   Weight: 44.5 kg (98 lb)   Height: 1.651 m (5' 5\")       BLOOD PRESSURE TRENDS  BP Readings from Last 2 Encounters:   10/26/20 104/60   10/13/20 140/84       HEIGHT & WEIGHT  Wt Readings from Last 2 Encounters:   10/26/20 44.5 kg (98 lb)   10/13/20 45.8 kg (101 lb)     Ht Readings from Last 2 Encounters:   10/26/20 1.651 m (5' 5\")   10/13/20 1.651 m (5' 5\")       Physical Exam  Vitals signs and nursing note reviewed.   Constitutional:       General: She is not in acute distress.     Appearance: She is not ill-appearing, toxic-appearing or diaphoretic.   HENT:      Head: Normocephalic and atraumatic.      Right Ear: Hearing and external ear normal.      Left Ear: Hearing and external ear normal.   Eyes:      General: Lids are normal. Lids " are everted, no foreign bodies appreciated.      Conjunctiva/sclera: Conjunctivae normal.   Skin:     General: Skin is warm and dry.      Capillary Refill: Capillary refill takes less than 2 seconds.   Neurological:      Mental Status: She is alert and oriented to person, place, and time.      Comments: Patient with benign neuro exam at this time. AAO x 3. no facial weakness noted. No droop. Speech is clear with no slurring. No balance or gait abnormalities or deficits. BLUEs 5/5, BLLEs 5/5. Reflexes +2 and symmetric at tested sites. No obvious sensory deficits. No lethargy or fatigue. No AMS changes. No tremors, weakness, or paresthesias. PERRL 3mm, EOMs intact. No headache at this time.            Laboratory Results, Immunizations, and Health Maintenance     LAST CBC/DIFF  Lab Results   Component Value Date    WBC 6.02 09/18/2020    HGB 13.0 09/18/2020    HCT 38.5 09/18/2020    MCV 99.7 (H) 09/18/2020     09/18/2020        LAST BMP  Lab Results   Component Value Date    GLUCOSE 87 09/18/2020    CALCIUM 10.0 09/18/2020     (L) 09/18/2020    K 5.2 (H) 09/18/2020    CO2 26 09/18/2020    CL 99 09/18/2020    BUN 13 09/18/2020    CREATININE 0.6 09/18/2020        LAST LFTs  Lab Results   Component Value Date    ALT 26 09/17/2017    AST 31 09/17/2017    ALKPHOS 51 09/17/2017    BILITOT 0.6 09/17/2017        LAST LIPID PANEL  Lab Results   Component Value Date    CHOL 209 (H) 09/18/2017     Lab Results   Component Value Date    HDL 81 09/18/2017     Lab Results   Component Value Date    LDLCALC 117 (H) 09/18/2017     Lab Results   Component Value Date    TRIG 54 09/18/2017     No results found for: CHOLHDL     LAST THYROID STUDIES  Lab Results   Component Value Date    TSH 1.69 09/17/2017        LAST A1C  No results found for: HGBA1C    IMMUNIZATIONS ON FILE  Immunization History   Administered Date(s) Administered   • Influenza Vaccine Quadrivalent 3 Yr And Older 11/01/2016   • Influenza Vaccine Quadrivalent  Preservative Free 6 Mons and Up 11/08/2019   • Influenza, Unspecified 11/01/2016, 11/15/2016   • Tdap 08/26/2015        Assessment and Plan     Assessment/Plan     Problem List Items Addressed This Visit        Nervous    Trigeminal neuralgia - Primary (Chronic)    Current Assessment & Plan     · See overview, HPI, ROS, PE.  · Discussed patient's s/s with collaborating physician, Dr. Roseanne Galvan DO, in absence of PCP.   · Neuro exam is WNL and baseline with normal MRI in 07/2020.   · Will refill gabapentin to be started as follows: 100 mg HS x 3 nights, 200 mg HS x 3 nights, and then 300 mg HS x ongoing nightly management if well-tolerated w/ no side effects.   · Patient has follow-up with neuro on Thursday and was enc. to keep this appointment.   · Will take no more Elavil.   · Reviewed red-flag symptoms and indications to present to the ER for further evaluation.    · Verbalized an understanding of education.                 No follow-ups on file.    No orders of the defined types were placed in this encounter.           Elly Owens DNP, CRNP, FNP-BC    10/26/2020

## 2020-10-26 NOTE — TELEPHONE ENCOUNTER
Spoke to patient who explains that she is having a flare up of her trigeminal neuralgia and she is out of her normal medications. I explained to her that we do not medical treat trigeminal neuralgia and she should call her eurologist. Her Neurologist was Dr. Sandhu who she explains has switched practices and can not get in with her until 2/2021. She has also tried to call [facility] but could not get an appt asap. She has also been in contact with her PCP. I provided her with the Neurology number at [facility] and told her that if he pain is unbearable then she could always go to the ED. She undertstood.

## 2020-10-26 NOTE — ASSESSMENT & PLAN NOTE
· See overview, HPI, ROS, PE.  · Discussed patient's s/s with collaborating physician, Dr. Roseanne Galvan DO, in absence of PCP.   · Neuro exam is WNL and baseline with normal MRI in 07/2020.   · Will refill gabapentin to be started as follows: 100 mg HS x 3 nights, 200 mg HS x 3 nights, and then 300 mg HS x ongoing nightly management if well-tolerated w/ no side effects.   · Patient has follow-up with neuro on Thursday and was enc. to keep this appointment.   · Will take no more Elavil.   · Reviewed red-flag symptoms and indications to present to the ER for further evaluation.    · Verbalized an understanding of education.

## 2020-10-26 NOTE — TELEPHONE ENCOUNTER
Elly, I called pt and gave her your message: can be seen at our office and we can at least make sure neuro exam is stable and if all is stable, then we could refill gabapentin until neuro is able to see her on Thursday. Pt stated that she would prefer to have an appt, have a neurological check done and have her gabapentin refilled until she can see the Neurologist.    Scheduled pt to see Elly/ VAIBHAV @ 4 pm today.

## 2020-12-03 ENCOUNTER — OFFICE VISIT (OUTPATIENT)
Dept: NEUROLOGY | Facility: CLINIC | Age: 65
End: 2020-12-03
Payer: COMMERCIAL

## 2020-12-03 VITALS
DIASTOLIC BLOOD PRESSURE: 74 MMHG | SYSTOLIC BLOOD PRESSURE: 102 MMHG | HEIGHT: 65 IN | BODY MASS INDEX: 17.83 KG/M2 | RESPIRATION RATE: 14 BRPM | WEIGHT: 107 LBS

## 2020-12-03 DIAGNOSIS — G50.0 TRIGEMINAL NEURALGIA: Primary | Chronic | ICD-10-CM

## 2020-12-03 DIAGNOSIS — H93.12 TINNITUS OF LEFT EAR: ICD-10-CM

## 2020-12-03 PROCEDURE — 99204 OFFICE O/P NEW MOD 45 MIN: CPT | Performed by: PSYCHIATRY & NEUROLOGY

## 2020-12-09 PROBLEM — H93.12 TINNITUS OF LEFT EAR: Status: ACTIVE | Noted: 2020-12-09

## 2020-12-10 NOTE — PROGRESS NOTES
Patient ID: Nohemy Machuca                              : 1955  MRN: [id number]                                            VISIT DATE: 12/3/2020    PRIMARY CARE PROVIDER: Ginger Gonzales DO    CONSULTING PHYSICIAN: Josh Keenan DO    CHIEF COMPLAINT: Tinnitus      HISTORY OF PRESENT ILLNESS:  Dear Dr. Gonzales,    I had the pleasure of seeing your patient Ms. Nohemy Machuca at the neurology clinic for a consultation.  She is accompanied by her  Deion Machuca.    As you know Ms. Machuca is a 65 year old right handed female with a history of cerebral cavernoma who has been having recurring tinnitus after having a dental procedure in 2020.    On 10/20/2020, she underwent a procedure to attempt to remove a cracked screw of a dental implant.  This was in the upper/left jaw.  This involved 3 hours of drilling.  Unfortunately, the procedure was unsuccessful and the screw could not be removed.  She then started noting recurring buzzing noise in the left ear.  Buzzing would be present all day and every day.  Denies facial pain, headache, hearing loss, diplopia, vision loss, vertigo, facial droop, dysphagia, dysarthria, trismus, aphasia, hemiplegia nor gait ataxia.  Severity of discomfort was rated 10 on a scale of 1-10.  Can be provoked by brushing her teeth or using a water pik.  Can also be provoked when she hears a loud noise. Can be provoked by exercising or walking quickly.  Has palpitations.     Noted history of trigeminal neuralgia after dental procedure in  which eventually subsided on its own.  At that time, she had symptoms of electric facial pain.    Has tried antibiotics (ineffective), prednisone (ineffective), Elavil 50 mg/day (caused syncope), Nortriptyline 100 mg/day (ineffective), Ibuprofen (ineffective), Gabapentin 300 mg BID, Cyclobenzaprine and audio masking (runs a fan in her room when sleeping).    Started carbamazepine on 2020 and is now up to 200 mg BID.  Since then, the intensity  "of the sensation has reduced to a 2 on a scale of 1-10.      I reviewed her available outside medical records personally.  Saw neurosurgeon Dr. Maggie Scales, neurologist Dr. Marva Damon and neurologist Dr. Paige Santizo.    MEDICATIONS:   Current Outpatient Medications:   •  cyclobenzaprine (FLEXERIL) 5 mg tablet, Take 5 mg by mouth 2 (two) times a day., Disp: , Rfl: 0  •  gabapentin (NEURONTIN) 100 mg capsule, Take 1 tablet (100 mg) nightly for 3 days. Then take 2 tablets (200 mg) nightly for 3 days. Then increase up to the 300 mg dose nightly if well-tolerated., Disp: 9 capsule, Rfl: 0  •  gabapentin (NEURONTIN) 300 mg capsule, Take 1 capsule (300 mg total) by mouth nightly., Disp: 30 capsule, Rfl: 0  •  multivitamin tablet, Take by mouth daily., Disp: , Rfl:     PAST MEDICAL HISTORY:  has a past medical history of Osteoporosis. She also has no past medical history of Tonsillitis.    PAST SURGICAL HISTORY:  has a past surgical history that includes Bunionectomy (Bilateral) and Tonsillectomy.    SOCIAL HISTORY:  reports that she has never smoked. She has never used smokeless tobacco. She reports current alcohol use. No history on file for drug.  Graduate school level of education.  She is .  She is college .    FAMILY HISTORY: family history includes Diverticulitis in her biological mother; Glaucoma in her biological father.    ALLERGIES: is allergic to no known allergies.     REVIEW OF SYSTEMS: Tinnitus  All other systems reviewed and negative except as noted in the HPI.    PHYSICAL EXAM:  Visit Vitals  /74   Resp 14   Ht 1.651 m (5' 5\")   Wt 48.5 kg (107 lb)   BMI 17.81 kg/m²   GENERAL APPEARANCE: Thin, well appearing, normally nourished and normally developed female.  Not in acute distress.  Appears stated age.  HEAD: Normocephalic, atraumatic. No forehead, maxillary nor temporal tenderness to palpation.  EYES:  Sclerae white. Conjunctivae clear.  FUNDOSCOPIC: Flat optic " discs. No papilledema. No retinal hemorrhages.  EAR/NOSE/THROAT: TM intact. No perforation.  Nares patent.  Throat noninjected.  NECK:  No bruits auscultated over the carotid regions. Neck was supple.  CARDIOVASCULAR:  Regular rate and rhythm. S1, S2 auscultated. No murmurs, rubs or gallops.  RESPIRATORY: Lungs clear to auscultation. No crackles, rhonchi or wheezing.  EXTREMITIES: No clubbing, no cyanosis nor edema.  MUSCULOSKELETAL: Normal muscle bulk and tone.  No spasticity nor rigidity.  No tremor.  SKIN:  Dry, intact. No rashes noted. Warm to touch.  LYMPH: No abnormal lymph nodes palpated in the neck region.  PSYCHIATRIC: Pressured speech.  Calm and cooperative with appropriate insight    NEUROLOGICAL EXAM:  MENTAL STATUS: Alert, awake and oriented with spontaneous fluent speech output.  CRANIAL NERVES: Hearing intact to whisper.  Berger testing was symmetrical.  Full visual fields to finger-counting. Pupils are equal, round and reactive to light. Extraocular movements are intact. No nystagmus. Symmetric smile. Uvula and tongue is midline. No dysarthria. No dysphonia.  MOTOR STRENGTH:  No pronator drift noted.    Deltoid Biceps Triceps Wrist ext Opp  pollicis Finger Spread Hip flexion Knee ext Knee   flexion Dorsi- flexion Plantar flexion   R 5 5 5 5 5 5 5 5 5 5 5   L 5 5 5 5 5 5 5 5 5 5 5   REFLEXES:  2+ biceps, brachioradialis and triceps bilaterally. 2+ patellar, 1+ Achilles reflexes bilaterally.   SENSATION: Face, arms and legs were intact to crude touch and cold temperature. Hands and feet were intact to vibration sensation.  COORDINATION/GAIT:  Finger-to-nose-to-finger was intact, no dysmetria.  Rapid alternating movements to finger tapping was intact, no dysdiadochokinesia.  Intact station and gait. Has a normal arm swing and stride length. No listing or ataxia was seen.      LABORATORY STUDIES:  8/4/2020: COVID-19 negative  CBC Results       09/18/20 09/18/17 09/17/17                    0906 0532 1206          WBC 6.02 4.72 6.91         RBC 3.86 4.11 3.82         HGB 13.0 13.7 12.7         HCT 38.5 38.6 36.6         MCV 99.7 93.9 95.8         MCH 33.7 33.3 33.2         MCHC 33.8 35.5 34.7          214 201                   BMP Results       09/18/20 08/24/18 09/18/17                    0905 1035 0532          -- 131         K 5.2 -- 4.5         Cl 99 -- 98         CO2 26 -- 26         Glucose 87 -- 102         BUN 13 8 9         Creatinine 0.6 0.6 0.7         Calcium 10.0 -- 9.5         Anion Gap 6 -- 7         EGFR >60.0 >60.0 --         Comment for EGFR at 1035 on 08/24/18:   Calculation assumes a body surface area of 1.73 sq. meters.      PT/PTT Results     No lab values to display.      UA Results       09/17/17                          1206           Color YELLOW           Clarity CLEAR           Glucose NEGATIVE           Bilirubin NEGATIVE           Ketones NEGATIVE           Sp Grav 1.008           Blood +1           Ph 7.5           Protein NEGATIVE           Urobilinogen 0.2           Nitrite NEGATIVE           Leuk Est NEGATIVE  Results can be falsely negative due to high specific gravity,some antibiotics,glucose >3 g/dl,or WBC other than neutrophils.             WBC 0 TO 3           RBC 0 TO 4           Bacteria NONE                       Lactate Results     No lab values to display.        Labs were reviewed by me personally.      IMAGING STUDIES:     CT head noncontrast 9/17/2017:  IMPRESSION: 9 mm hyperdense area in the left thalamus    MRI brain with/without IV contrast 9/18/2017:  COMMENT:  Mild increase in T2 signal is visualized in the periventricular white  matter tracts most likely representing chronic small vessel ischemic  disease.  5 mm by 6mm round lesion is identified in the superior aspect  of the left thalamus within peripheral rim of probable hemosiderin  deposition.  Post contrast-enhanced images demonstrate no abnormal  enhancement.  This likely represents a cavernoma.   Axial gradient echo  images also demonstrate decrease in signal intensity lesions within the  left thalamus, left yaz and right cerebellum likely representing areas  of hemosiderin deposition.  No intraparenchymal mass, acute infarcts are  seen. The ventricles and cisterns are normal. No extra-axial masses or  focal fluid collections are seen. Post contrast-enhanced images  demonstrate incidental venous angioma involving the left thalamus.  There  is no other abnormal enhancement postcontrast administration.  Mild mucosal thickening is seen in the bilateral ethmoid sinuses.  No significant stenosis or aneurysms are identified in the [place].  Left posterior communicating artery is seen.  Left A1 segment is  not visualized likely congenitally absent.  Bilateral vertebral arteries are co-dominant.  IMPRESSION:  1.  A lesion within the left thalamus with peripheral rim of hemosiderin  deposition without abnormal enhancement.  This most likely represents a  cavernoma.  2.  No focal acute intracranial abnormality.  3. No significant stenosis or aneurysms in the [place].    MRI brain with/without IV contrast 8/28/2018:  IMPRESSION:  1. Several small foci of hemosiderin which may be due to multiple cavernomas  and/or microhemorrhages with the largest in the left thalamus, stable.  2. No acute intracranial abnormality. No acute infarct, mass effect or acute  intracranial hemorrhage.    MRI brain with/without IV contrast 7/10/2020:  COMMENT:  There is a 6 mm cavernoma again seen in the left thalamus.  There is a small  developmental venous anomaly associated with it.  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  Tiny focus of susceptibility in the left yaz and right cerebellum again noted.  Ventricles and sulci are normal in size and configuration. There is mild  periventricular and scattered subcortical white matter disease which is  nonspecific  but likely related to microangiopathic change. There is no  restricted diffusion to suggest an acute infarct. There is no intraparenchymal  hemorrhage, midline shift, mass or mass-effect. There is no extra-axial  collection. The sella appears unremarkable. The cerebellar tonsils are normal in  position. Vascular flow-voids are unremarkable. Bone marrow signal is  unremarkable.  IMPRESSION:  Similar left thalamic cavernoma.   No acute abnormality.    CT/MRI scans were visualized by me personally.      CARDIOVASCULAR STUDIES:    12-lead EKG 9/18/2020: Heart rate 52 beats per minute.  Sinus bradycardia.      ASSESSMENT:  A 65 year old right handed female with a history of cerebral cavernoma who has been having recurring tinnitus after having a dental procedure in October 2020.  Her neurological examination is nonfocal and normal.  The etiology of her clinical presentation is unclear.  Suspect trigeminal neuralgia.  Differential diagnosis includes: inner ear disorder (e.g. sensorineural hearing loss, Meniere's disease).      RECOMMENDATIONS:    Tinnitus, Head discomfort  - Agree with carbamazepine 200 mg BID.  Can increase to TID as tolerated.  - Supportive care measures including: emotional stress reduction, avoid sleep deprivation (aim to sleep 7-8 hours per night), avoid dehydration (aim to drink 7-8 glasses of water per day), avoid skipping meals (aim to eat 3 meals per day).  - Can continue audio masking.  She has been running a fan in her room at night.    Cerebral cavernoma  - Routine follow-up with neurosurgery regarding timing of follow-up imaging.  - She reports no history of seizures.  - She reports no known family history of cavernoma.    I have asked the patient to follow-up in the neurology clinic in 3-4 months or earlier if needed.    Thank you for allowing me to participate in the care of your patient.  Please feel free to contact me at any time if you have any further questions.    47 minutes total  face-to-face encounter time with the patient, of which greater than 50% of time was spent counseling/coordination of care.    Josh Keenan DO  Neurologist  [facility]

## 2020-12-21 ENCOUNTER — TELEPHONE (OUTPATIENT)
Dept: NEUROLOGY | Facility: CLINIC | Age: 65
End: 2020-12-21

## 2020-12-21 NOTE — TELEPHONE ENCOUNTER
Pt called very annoyed that her medications listed in her chart, she does not take gabapentin please take them out

## 2020-12-22 ENCOUNTER — OFFICE VISIT (OUTPATIENT)
Dept: PRIMARY CARE | Facility: CLINIC | Age: 65
End: 2020-12-22
Payer: COMMERCIAL

## 2020-12-22 VITALS
BODY MASS INDEX: 16.5 KG/M2 | SYSTOLIC BLOOD PRESSURE: 100 MMHG | WEIGHT: 99 LBS | DIASTOLIC BLOOD PRESSURE: 70 MMHG | HEIGHT: 65 IN | HEART RATE: 61 BPM | OXYGEN SATURATION: 98 % | TEMPERATURE: 98 F

## 2020-12-22 DIAGNOSIS — M81.0 OSTEOPOROSIS WITHOUT CURRENT PATHOLOGICAL FRACTURE, UNSPECIFIED OSTEOPOROSIS TYPE: Chronic | ICD-10-CM

## 2020-12-22 DIAGNOSIS — G50.0 TRIGEMINAL NEURALGIA: Chronic | ICD-10-CM

## 2020-12-22 DIAGNOSIS — H93.12 TINNITUS OF LEFT EAR: ICD-10-CM

## 2020-12-22 DIAGNOSIS — Z12.31 ENCOUNTER FOR SCREENING MAMMOGRAM FOR MALIGNANT NEOPLASM OF BREAST: ICD-10-CM

## 2020-12-22 DIAGNOSIS — Z00.00 ANNUAL PHYSICAL EXAM: Primary | ICD-10-CM

## 2020-12-22 DIAGNOSIS — Q28.3 CEREBRAL CAVERNOMA: Chronic | ICD-10-CM

## 2020-12-22 PROCEDURE — 99396 PREV VISIT EST AGE 40-64: CPT | Performed by: FAMILY MEDICINE

## 2020-12-22 RX ORDER — CARBAMAZEPINE 200 MG/1
100 TABLET ORAL 2 TIMES DAILY
COMMUNITY
Start: 2020-12-22 | End: 2021-02-11

## 2020-12-22 RX ORDER — CARBAMAZEPINE 200 MG/1
200 TABLET ORAL 2 TIMES DAILY
COMMUNITY
End: 2020-12-22

## 2020-12-22 NOTE — PROGRESS NOTES
"            OFFICE VISIT NOTE     FEI PRECIADO DO        PATIENT NAME:Noehmy Machuca  PATIENT : 1955  AMANDA: 2020    CC:   Chief Complaint   Patient presents with   • Annual Exam     weight loss- pt says due to teeth extractions   • Ganglion Cyst     removed left wrist 10/16- had f/u eval 10/27-  irriation to wrist     \"annual physical \"         HPI   Nohemy Machuca is a 65 y.o. female  Working as a  [employer] living with  with incidental cerebral cavernoma, trigeminal neuralgia, osteoporosis presenting to review chronic conditions and here for annual physical evaluation. She is new to me and this is our second visit.   Today she has labile mood nad tearful when talking about her dtrs wedding. She has had significant reduction in QOL with tinnitus that is not resolved.     #annual physical    HEALTH MAINTENANCE    -- Pneumonia Immunization: not DUE  -- Influenza Immunization: DUE declines   -- Shingles Immunization:shingles  -- Colorectal cancer screening:dr oleary   q 3 years? Due 2020 (declines)   -- Breast Cancer screening: mammo 2020. Due cript given   -- Cervical cancer screening: DUE normal  q3-5 years  6/15/18   -- Bone Health screenin2017 +osteoporosis  -- Vision Screening:intact normal   -- Hearing Screening: ent hearing loss       #osteoporosis  DEXA 2017 +osteoporosis  +fam hx mother and M grandmother  Taking MVI  Taking vit D3 1000  Weight bearnig exercises- lifting and walking   + fracture left hand from shoveling     #cavernoma left thalamas, cerebral    found on MRI  For admission for trigeminal neurlagia   followup adelina borja  q2 years MRI w and WO  Thought to be 2/2 mild trauma young no overt issues now at this time. Surveillance  7/10/2020 recent MRI     #trigeminal neuralgia  #tinnitus, bl   Tegretol 100 daily   Seen by neuro  Dr perla recenttly  Ongoing     #bl ETD and TMJ tensions  #bl high freq sensorineural hearing loss " -- tinnitus   - seeing ent astelin nasal spray for allergic rhinitis  - dr powers ENT, flavanoid     #vitiligo- chronic, father. Not seenig derm     SOCIAL HISTORY:  Denies etoh, drug, tobacco      FUNCTIONAL HISTORY:  ADL   Feeding-I  Toileting-I  Dressing-I  Bathing-I  Transferring-I    IADL  Medications-I  Shopping-I  Finances-I  Cooking-I  Cleaning-I        ADVANCED CARE PLANNING:  poa is     FULL CODE    DEPRESSION/MOOD:  GEOVANNA:denies  PHQ9:denies  Mother  age 62 suicide       The following have been reviewed and updated as appropriate in this visit: active problem list, medication list, allergies, family history, social history, health maintenance            Past Medical History:   Diagnosis Date   • Osteoporosis        Past Surgical History:   Procedure Laterality Date   • BUNIONECTOMY Bilateral    • TONSILLECTOMY       Social History     Socioeconomic History   • Marital status:      Spouse name: Not on file   • Number of children: Not on file   • Years of education: Not on file   • Highest education level: Not on file   Occupational History   • Not on file   Social Needs   • Financial resource strain: Not on file   • Food insecurity     Worry: Not on file     Inability: Not on file   • Transportation needs     Medical: Not on file     Non-medical: Not on file   Tobacco Use   • Smoking status: Never Smoker   • Smokeless tobacco: Never Used   Substance and Sexual Activity   • Alcohol use: Yes     Frequency: Monthly or less     Comment: occ wine 3x per year   • Drug use: Not on file   • Sexual activity: Not on file   Lifestyle   • Physical activity     Days per week: Not on file     Minutes per session: Not on file   • Stress: Not on file   Relationships   • Social connections     Talks on phone: Not on file     Gets together: Not on file     Attends Hoahaoism service: Not on file     Active member of club or organization: Not on file     Attends meetings of clubs or organizations: Not on file     " Relationship status: Not on file   • Intimate partner violence     Fear of current or ex partner: Not on file     Emotionally abused: Not on file     Physically abused: Not on file     Forced sexual activity: Not on file   Other Topics Concern   • Not on file   Social History Narrative   • Not on file         Family History   Problem Relation Age of Onset   • Diverticulitis Biological Mother    • Glaucoma Biological Father          Allergies   Allergen Reactions   • No Known Allergies          Current Outpatient Medications   Medication Sig Dispense Refill   • cyclobenzaprine (FLEXERIL) 5 mg tablet Take 5 mg by mouth 2 (two) times a day.  0   • multivitamin tablet Take by mouth daily.       No current facility-administered medications for this visit.        Review of Systems  ROS  See hpi.   General: Denies fatigue, weight loss or weight gain.    Head: Denies headaches trauma   Eyes: Denies blurry vision, diplopia, decreased vision.  Denies drainage or excessive tearing.  Ears: ++ tinnitis, denies decreased hearing, ear drainage  Nose: Denies rhinorrhea, epistaxi  Mouth: Denies dry mouth  Neck: Denies lumps, bumps.  Denies dysphagia, odynophagia  Pulmonary:  Denies shortness of breath, difficulty breathing, excessive drooling, change in sputum.   Cardiac: Denies chest pain, flip-flop sensation   GI/Abdomen: Denies vomit, nausea, diarrhea, constipation, hematochezia.  Denies abdominal pain.    Uro/: Denies hematuria, urgency, frequency, burning sensation with urination. Denies discharge.   Skin: Denies lumps, bumps, rash.   MSK:  Denies weakness, numbness, tingling.    Neuro: Denies confusion, vertigo.              VITALS  Vitals:    12/22/20 0957   BP: 100/70   Pulse: 61   Temp: 36.7 °C (98 °F)   SpO2: 98%             Wt Readings from Last 3 Encounters:   12/03/20 48.5 kg (107 lb)   10/26/20 44.5 kg (98 lb)   10/13/20 45.8 kg (101 lb)       Ht Readings from Last 3 Encounters:   12/03/20 1.651 m (5' 5\")   10/26/20 " "1.651 m (5' 5\")   10/13/20 1.651 m (5' 5\")       BP Readings from Last 3 Encounters:   12/03/20 102/74   10/26/20 104/60   10/13/20 140/84       Physical Exam  PHYSICAL EXAM  General: Appears well, in no distress. Pt is pleasant. Hygiene normal. Thin frame  Head: NC/AT.   Eyes: Conjunctiva and sclera normal bilaterally. No lid-lag.  PERRL. EOMI.   Ears: No tenderness of external ears bilterally.  Tympanic membrane Intact bilaterally.  No cerumen impaction. No erythema of canals bilaterally.   Nose: Inferior turbinates normal bilaterally.   Mouth: Oral mucosa pink and moist.No erythema or edema of oral pharynx. No tonsillar exudates or hypertrophy. Uvula midline and without swelling.   Neck: Inspection normal. Trachea midline. supple, FROM without pain. No cervical lymphadenopathy.  No enlargement of thyroid.  Cardiac: regular, rate, and rhythm. No murmurs, rubs, or gallops.  Lungs: negative for respiratory distress with normal respiratory effort. Lungs clear to auscultation bilaterally. No wheezes, rales, or rhonchi. No intercostal retractions  Abdomen: +BS. Bowel sounds normal. Abdomen is soft, non-distended. No tenderness. No masses or organomegaly. No guarding.   Skin: warm and dry. No erythema or rash.  Neuro: CNII-XII intact.   Extremities: No peripheral edema or extremity lymphadenopathy  MSK: 5/5 UE and LE b/l. Gait stable and intact. Sitting Balance stable.   Psych: linear. Normal mood and affect.  No SI/HI. AAOX3.         PERTINENT LABS, IMAGING    No new labs.     Lab Results   Component Value Date    WBC 6.02 09/18/2020    HGB 13.0 09/18/2020    HCT 38.5 09/18/2020    MCV 99.7 (H) 09/18/2020     09/18/2020         Chemistry        Component Value Date/Time     (L) 09/18/2020 0905    K 5.2 (H) 09/18/2020 0905    CL 99 09/18/2020 0905    CO2 26 09/18/2020 0905    BUN 13 09/18/2020 0905    CREATININE 0.6 09/18/2020 0905        Component Value Date/Time    CALCIUM 10.0 09/18/2020 0905    JOS " 51 09/17/2017 1206    AST 31 09/17/2017 1206    ALT 26 09/17/2017 1206    BILITOT 0.6 09/17/2017 1206            Lab Results   Component Value Date    CHOL 209 (H) 09/18/2017    CHOL 193 03/16/2017     Lab Results   Component Value Date    HDL 81 09/18/2017    HDL 76 03/16/2017     Lab Results   Component Value Date    LDLCALC 117 (H) 09/18/2017    LDLCALC 99 03/16/2017     Lab Results   Component Value Date    TRIG 54 09/18/2017    TRIG 91 03/16/2017     No results found for: CHOLHDL    Lab Results   Component Value Date    TSH 1.69 09/17/2017       No results found for: HGBA1C    No results found for: HAV, HEPAIGM, HEPBIGM, HEPBCAB, HBEAG, HEPCAB    No results found for: MICROALBUR, [id number]    No results found for this or any previous visit (from the past 24 hour(s)).    No results found.            Immunization History   Administered Date(s) Administered   • Influenza Vaccine Quadrivalent 3 Yr And Older 11/01/2016   • Influenza Vaccine Quadrivalent Preservative Free 6 Mons and Up 11/08/2019   • Influenza, Unspecified 11/01/2016, 11/15/2016   • Tdap 08/26/2015         Health Maintenance   Topic Date Due   • Varicella Vaccines (1 of 2 - 2-dose childhood series) [birth year]11/1958   • HIV Screening  08/11/1970   • Cervical Cancer Screening  08/11/1978   • Zoster Vaccine (1 of 2) 08/11/2007   • Colonoscopy  08/11/2007   • Influenza Vaccine (1) 08/01/2020   • Mammogram  01/22/2022   • DTaP, Tdap, and Td Vaccines (2 - Td) 08/26/2025   • Hepatitis C Screening  Completed   • Meningococcal ACWY  Aged Out   • HIB Vaccines  Aged Out   • IPV Vaccines  Aged Out   • HPV Vaccines  Aged Out   • Pneumococcal  Aged Out            Diagnosis Plan   1. Annual physical exam  BI SCREENING MAMMOGRAM BILATERAL(TOMOSYNTHESIS)    Comprehensive metabolic panel    CBC and Differential    Hemoglobin A1c    Vitamin D 25 hydroxy    Lipid panel    TSH w reflex FT4    Comprehensive metabolic panel    CBC and Differential    Hemoglobin A1c    Vitamin D  25 hydroxy    Lipid panel    TSH w reflex FT4   2. Encounter for screening mammogram for malignant neoplasm of breast  BI SCREENING MAMMOGRAM BILATERAL(TOMOSYNTHESIS)   3. Trigeminal neuralgia  BI SCREENING MAMMOGRAM BILATERAL(TOMOSYNTHESIS)    Comprehensive metabolic panel    CBC and Differential    Hemoglobin A1c    Vitamin D 25 hydroxy    Lipid panel    TSH w reflex FT4    Comprehensive metabolic panel    CBC and Differential    Hemoglobin A1c    Vitamin D 25 hydroxy    Lipid panel    TSH w reflex FT4   4. Cerebral cavernoma  BI SCREENING MAMMOGRAM BILATERAL(TOMOSYNTHESIS)    Comprehensive metabolic panel    CBC and Differential    Hemoglobin A1c    Vitamin D 25 hydroxy    Lipid panel    TSH w reflex FT4    Comprehensive metabolic panel    CBC and Differential    Hemoglobin A1c    Vitamin D 25 hydroxy    Lipid panel    TSH w reflex FT4   5. Osteoporosis without current pathological fracture, unspecified osteoporosis type  Vitamin D 25 hydroxy    Vitamin D 25 hydroxy   6. Tinnitus of left ear           Nohemy Machuca is a 65 y.o. female  Working as a  [employer] living with  with incidental cerebral cavernoma, trigeminal neuralgia, osteoporosis presenting to review chronic conditions and here for annual physical evaluation.    #annual physical--stable return in 1 year  #BMI < 18   reviewed bmi goals and increased protein intake  Routine labs ordered  ;routine labs and mammo ordered      HEALTH MAINTENANCE    -- Pneumonia Immunization: not DUE  -- Influenza Immunization: give today  -- Shingles Immunization:shingles  -- Colorectal cancer screening:dr oleary 2017  q 3 years? Due 2020  -- Breast Cancer screening: mammo  Script 2020 and due 2021 given   -- Cervical cancer screening: DUE normal  q3-5 years  6/15/18 . See gyn across due   -- Bone Health screenin2017 +osteoporosis  / script given  -- Vision Screening:intact normal   -- Hearing Screening: ent hearing loss        #osteoporosis  DEXA 9/25/2017 +osteoporosis  +fam hx mother and M grandmother   + fracture left hand from shoveling   Taking MVI  Taking vit D3 1000  Ed and counseling and reviewed needed amounts of ca and vit D handout given to look at diet intake  Weight bearnig exercises- lifting and walking  Significant dental work and may need to see rheum or endo for osteoporosis management  dexa script given    #cavernoma left thalamas, cerebral    found on MRI  For admission for trigeminal neurlagia   followup adelina borja  q2 years MRI w and WO  Thought to be 2/2 mild trauma young no overt issues now at this time. surveillance    #trigeminal neuralgia  #tinnitis    on elavil 25 in past  Seen by neuro  Dr perla   trial tegretol 100 BID ( 500mg tdd is sedating)  Head phones suppressing     #bl ETD and TMJ tensions  #bl high freq sensorineural hearing loss -- tinnitus   - seeing ent astelin nasal spray for allergic rhinitis  - she is not taking nasal spray regularly  - dr powers     #labile, mood  -has papers in folder labile  - concern this tinnitus has affected her mood and sleep   - insight stable  - rec psych but declines.   #vitiligo- chronic, father. Not seenig derm     CTM      Return in about 3 months (around 3/22/2021), or if symptoms worsen or fail to improve, for Followup with Dr. Ginger Gonzales.    Ginger Gonzales, DO

## 2020-12-29 ENCOUNTER — TELEPHONE (OUTPATIENT)
Dept: NEUROLOGY | Facility: CLINIC | Age: 65
End: 2020-12-29

## 2020-12-29 NOTE — TELEPHONE ENCOUNTER
"Nohemy called stating that her symptoms are not relieved with the current dose of Tegretol.     Nohemy reports that she increased the dose and \"stared at the wall for 45 min,\" she didn't know who or where she was.     She is no longer experiencing those symptoms, however she would like to know if these is an alternative medication.     Nohemy can be reached on her cell.   "

## 2020-12-29 NOTE — TELEPHONE ENCOUNTER
Hi Hernandez, this is a new symptom.  Please contact the patient and help schedule a follow-up visit as soon as possible for further evaluation.  Thanks so much!

## 2021-01-04 ENCOUNTER — APPOINTMENT (OUTPATIENT)
Dept: LAB | Facility: HOSPITAL | Age: 66
End: 2021-01-04
Attending: PSYCHIATRY & NEUROLOGY
Payer: COMMERCIAL

## 2021-01-04 ENCOUNTER — OFFICE VISIT (OUTPATIENT)
Dept: NEUROLOGY | Facility: CLINIC | Age: 66
End: 2021-01-04
Payer: COMMERCIAL

## 2021-01-04 VITALS — RESPIRATION RATE: 16 BRPM | SYSTOLIC BLOOD PRESSURE: 100 MMHG | HEART RATE: 60 BPM | DIASTOLIC BLOOD PRESSURE: 70 MMHG

## 2021-01-04 DIAGNOSIS — Q28.3 CEREBRAL CAVERNOMA: Chronic | ICD-10-CM

## 2021-01-04 DIAGNOSIS — G50.0 TRIGEMINAL NEURALGIA: Chronic | ICD-10-CM

## 2021-01-04 DIAGNOSIS — H93.12 TINNITUS OF LEFT EAR: Primary | ICD-10-CM

## 2021-01-04 DIAGNOSIS — Z00.00 ROUTINE GENERAL MEDICAL EXAMINATION AT A HEALTH CARE FACILITY: Primary | ICD-10-CM

## 2021-01-04 DIAGNOSIS — H69.93 DYSFUNCTION OF BOTH EUSTACHIAN TUBES: Chronic | ICD-10-CM

## 2021-01-04 LAB
FOLATE SERPL-MCNC: >20 NG/ML
TSH SERPL DL<=0.05 MIU/L-ACNC: 2.34 MIU/L (ref 0.34–5.6)
VIT B12 SERPL-MCNC: 657 PG/ML (ref 180–914)

## 2021-01-04 PROCEDURE — 82746 ASSAY OF FOLIC ACID SERUM: CPT

## 2021-01-04 PROCEDURE — 84443 ASSAY THYROID STIM HORMONE: CPT

## 2021-01-04 PROCEDURE — 99215 OFFICE O/P EST HI 40 MIN: CPT | Performed by: PSYCHIATRY & NEUROLOGY

## 2021-01-04 PROCEDURE — 86038 ANTINUCLEAR ANTIBODIES: CPT

## 2021-01-04 PROCEDURE — 36415 COLL VENOUS BLD VENIPUNCTURE: CPT

## 2021-01-04 PROCEDURE — 82607 VITAMIN B-12: CPT

## 2021-01-04 NOTE — PROGRESS NOTES
Patient ID: Nohemy Machuca                              : 1955  MRN: [id number]                                            VISIT DATE: 2021    PRIMARY CARE PROVIDER: Ginger Gonzales DO    CONSULTING PHYSICIAN: Josh Keenan DO    CHIEF COMPLAINT: Follow-up      HISTORY OF PRESENT ILLNESS:  Dear Dr. Gonzales,    Ms. Nohemy Machuca returns the neurology clinic for a followup visit.  She is accompanied by her  Deion Machuca.     As you know Ms. Machuca is a 65 year old right handed female with a history of cerebral cavernoma who has been having recurring tinnitus after having a dental procedure in 2020.    Noted prior history of trigeminal neuralgia after dental procedure in  which eventually subsided on its own.  At that time, she had symptoms of electric facial pain.     Has tried carbamazepine (ineffective), antibiotics (ineffective), prednisone (ineffective), Elavil 50 mg/day (caused syncope), Nortriptyline 100 mg/day (ineffective), Ibuprofen (ineffective), Gabapentin 300 mg BID, Cyclobenzaprine and audio masking (runs a fan in her room when sleeping).    Interval HPI:   She still notes intractable buzzing/tinnitus sensation in the left ear.  Sensation can be provoked by sitting up from a supine position.  Sensation is slightly alleviated when laying down in a quiet room.  Also notes discomfort with chewing.  Finds it hard to eat.  Food sometimes gets stuck in the dental bridge in her mouth.  Has insomnia which she states is multifactorial including a component of anxiety about health, tinnitus as well as her  snoring.  Feels she is unable to perform her regular duties at work.  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  Notes loud noise intolerance.  Has difficulty focusing.  Denies AH/VH SSI.  Denies vision loss, facial droop, hearing loss, hemiplegia no gait ataxia.    She tapered herself off of carbamazepine on 2020 after she  noted episodes of staring.      I reviewed her available outside medical records personally.    MEDICATIONS:   Current Outpatient Medications:   •  multivitamin tablet, Take by mouth daily., Disp: , Rfl:   •  carBAMazepine (TEGretol) 200 mg tablet, Take 0.5 tablets (100 mg total) by mouth 2 (two) times a day., Disp: , Rfl:     PAST MEDICAL HISTORY:  has a past medical history of Osteoporosis. She also has no past medical history of Tonsillitis.    PAST SURGICAL HISTORY:  has a past surgical history that includes Bunionectomy (Bilateral) and Tonsillectomy.    SOCIAL HISTORY:  reports that she has never smoked. She has never used smokeless tobacco. She reports current alcohol use. No history on file for drug. Graduate school level of education.  She is .  She is college .    FAMILY HISTORY: family history includes Diverticulitis in her biological mother; Glaucoma in her biological father.    ALLERGIES: is allergic to no known allergies.     REVIEW OF SYSTEMS:   All other systems reviewed and negative except as noted in the HPI.    PHYSICAL EXAM:  Visit Vitals  /70   Pulse 60   Resp 16   GENERAL APPEARANCE: Thin, well appearing, moderately nourished and normally developed female.  Not in acute distress.  Appears stated age.  HEAD: Normocephalic, atraumatic. No forehead, maxillary nor temporal tenderness to palpation.  EYES:  Sclerae white. Conjunctivae clear.  FUNDOSCOPIC: Flat optic discs. No papilledema. No retinal hemorrhages.  EAR/NOSE/THROAT: TM intact. No perforation.  Nares patent.  Throat noninjected.  NECK:  No bruits auscultated over the carotid regions. Neck was supple.  CARDIOVASCULAR:  Regular rate and rhythm. S1, S2 auscultated. No murmurs, rubs or gallops.  RESPIRATORY: Lungs clear to auscultation. No crackles, rhonchi or wheezing.  EXTREMITIES: No clubbing, no cyanosis nor edema.  MUSCULOSKELETAL: Moderate muscle bulk and tone.  No spasticity nor rigidity.  No  tremor.  SKIN:  Dry, intact. No rashes noted. Warm to touch.  LYMPH: No abnormal lymph nodes palpated in the neck region.  PSYCHIATRIC: Pressured and tangential speech.  Calm and cooperative with appropriate insight     NEUROLOGICAL EXAM:  MENTAL STATUS: Alert and awake.  Spontaneous fluent speech output.  She refused to do a cognitive test at this time, citing anxiety about the possible result.  CRANIAL NERVES: Full visual fields to finger-counting. Pupils are equal, round and reactive to light. Extraocular movements are intact. No nystagmus. Symmetric smile. Uvula and tongue is midline. No dysarthria. No dysphonia.  MOTOR STRENGTH:  No pronator drift noted.    Deltoid Biceps Triceps Wrist ext Opp  pollicis Finger Spread Hip flexion Knee ext Knee   flexion Dorsi- flexion Plantar flexion   R 5 5 5 5 5 5 5 5 5 5 5   L 5 5 5 5 5 5 5 5 5 5 5   SENSATION: Face, arms and legs were intact to crude touch.  COORDINATION/GAIT:  Finger-to-nose-to-finger was intact, no dysmetria.  Rapid alternating movements to finger tapping was intact, no dysdiadochokinesia.  Intact station and gait. Has a normal arm swing and stride length. No listing or ataxia was seen.    LABORATORY STUDIES:  8/4/2020: COVID-19 negative  CBC Results       09/18/20 09/18/17 09/17/17                    0906 0532 1206         WBC 6.02 4.72 6.91         RBC 3.86 4.11 3.82         HGB 13.0 13.7 12.7         HCT 38.5 38.6 36.6         MCV 99.7 93.9 95.8         MCH 33.7 33.3 33.2         MCHC 33.8 35.5 34.7          214 201                   BMP Results       09/18/20 08/24/18 09/18/17                    0905 1035 0532          -- 131         K 5.2 -- 4.5         Cl 99 -- 98         CO2 26 -- 26         Glucose 87 -- 102         BUN 13 8 9         Creatinine 0.6 0.6 0.7         Calcium 10.0 -- 9.5         Anion Gap 6 -- 7         EGFR >60.0 >60.0 --         Comment for EGFR at 1035 on 08/24/18:   Calculation assumes a body surface area of 1.73 sq.  meters.      PT/PTT Results     No lab values to display.      UA Results       09/17/17                          1206           Color YELLOW           Clarity CLEAR           Glucose NEGATIVE           Bilirubin NEGATIVE           Ketones NEGATIVE           Sp Grav 1.008           Blood +1           Ph 7.5           Protein NEGATIVE           Urobilinogen 0.2           Nitrite NEGATIVE           Leuk Est NEGATIVE  Results can be falsely negative due to high specific gravity,some antibiotics,glucose >3 g/dl,or WBC other than neutrophils.             WBC 0 TO 3           RBC 0 TO 4           Bacteria NONE                       Lactate Results     No lab values to display.        Labs were reviewed by me personally.      IMAGING STUDIES:     CT head noncontrast 9/17/2017:  IMPRESSION: 9 mm hyperdense area in the left thalamus     MRI brain with/without IV contrast 9/18/2017:  COMMENT:  Mild increase in T2 signal is visualized in the periventricular white  matter tracts most likely representing chronic small vessel ischemic  disease.  5 mm by 6mm round lesion is identified in the superior aspect  of the left thalamus within peripheral rim of probable hemosiderin  deposition.  Post contrast-enhanced images demonstrate no abnormal  enhancement.  This likely represents a cavernoma.  Axial gradient echo  images also demonstrate decrease in signal intensity lesions within the  left thalamus, left yaz and right cerebellum likely representing areas  of hemosiderin deposition.  No intraparenchymal mass, acute infarcts are  seen. The ventricles and cisterns are normal. No extra-axial masses or  focal fluid collections are seen. Post contrast-enhanced images  demonstrate incidental venous angioma involving the left thalamus.  There  is no other abnormal enhancement postcontrast administration.  Mild mucosal thickening is seen in the bilateral ethmoid sinuses.  No significant stenosis or aneurysms are identified in the [place]  [place]  [place].  Left posterior communicating artery is seen.  Left A1 segment is  not visualized likely congenitally absent.  Bilateral vertebral arteries are co-dominant.  IMPRESSION:  1.  A lesion within the left thalamus with peripheral rim of hemosiderin  deposition without abnormal enhancement.  This most likely represents a  cavernoma.  2.  No focal acute intracranial abnormality.  3. No significant stenosis or aneurysms in the [place].     MRI brain with/without IV contrast 8/28/2018:  IMPRESSION:  1. Several small foci of hemosiderin which may be due to multiple cavernomas  and/or microhemorrhages with the largest in the left thalamus, stable.  2. No acute intracranial abnormality. No acute infarct, mass effect or acute  intracranial hemorrhage.     MRI brain with/without IV contrast 7/10/2020:  COMMENT:  There is a 6 mm cavernoma again seen in the left thalamus.  There is a small  developmental venous anomaly associated with it.  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  Tiny focus of susceptibility in the left yaz and right cerebellum again noted.  Ventricles and sulci are normal in size and configuration. There is mild  periventricular and scattered subcortical white matter disease which is  nonspecific but likely related to microangiopathic change. There is no  restricted diffusion to suggest an acute infarct. There is no intraparenchymal  hemorrhage, midline shift, mass or mass-effect. There is no extra-axial  collection. The sella appears unremarkable. The cerebellar tonsils are normal in  position. Vascular flow-voids are unremarkable. Bone marrow signal is  unremarkable.  IMPRESSION:  Similar left thalamic cavernoma.   No acute abnormality.     CT/MRI scans were visualized by me personally.        CARDIOVASCULAR STUDIES:     12-lead EKG 9/18/2020: Heart rate 52 beats per minute.  Sinus bradycardia.      ASSESSMENT:  A 65 year old  right handed female with a history of cerebral cavernoma who has been having recurring tinnitus after having a dental procedure in October 2020.  Her neurological examination is nonfocal and normal.  The etiology of her clinical presentation is unclear.  Suspect trigeminal neuralgia.  Differential diagnosis includes: inner ear disorder (e.g. sensorineural hearing loss, Meniere's disease).    Also has noted several other symptoms including staring spell, focusing difficulties, insomnia at night and has severe anxiety about her health.  The etiology of her symptoms is unclear, however I suspect at least some component of pseudodementia.  Differential diagnosis is wide and includes: epilepsy, syncope, sleep disorder (e.g. narcolepsy), metabolic derangement ( including hypothyroidisim, vitamin B12 deficiency or medication side effect).      RECOMMENDATIONS:    Tinnitus, Head discomfort  - Referral made to neurotology for further evaluation/management.  - Supportive care measures including: emotional stress reduction, avoid sleep deprivation (aim to sleep 7-8 hours per night), avoid dehydration (aim to drink 7-8 glasses of water per day), avoid skipping meals (aim to eat 3 meals per day).  - Can continue audio masking.  She has been running a fan in her room at night.    Staring spell, Focusing difficulties  - I have ordered labs Vitamin B12, folate, TSH and IMELDA.  - I have ordered an EEG study to rule out electrographic tendency towards epilepsy.  - I have ordered a carotid ultrasound.     Cerebral cavernoma  - Routine follow-up with neurosurgery regarding timing of follow-up imaging.  - She reports no history of seizures.  - She reports no known family history of cavernoma.    Insomnia  - Advised taking melatonin 1 mg QHS, can titrate every 3-4 days to therapeutic response or initial maximum dose of 5 mg QHS.  - Advise good sleep hygiene/habits.    Anxiety about health  - Denies /VH/SI.  - I advised a psych consult  and offered to provide her with a referral to psychiatry.  She declined at this time.     I have asked the patient to follow-up in the neurology clinic in 3-4 months or earlier if needed.     Thank you for allowing me to participate in the care of your patient.  Please feel free to contact me at any time if you have any further questions.     I spent 42 minutes on this date of service performing the following activities: obtaining history, performing examination, entering orders, documenting, preparing for visit, obtaining / reviewing records, providing counseling and education, independently reviewing study/studies and coordinating care.     Josh Keenan, DO  Neurologist  [facility]

## 2021-01-04 NOTE — PATIENT INSTRUCTIONS
Patient Education     You can try taking Melatonin up to 5 mg at night to help with insomnia.    Insomnia  Insomnia is a sleep disorder that makes it difficult to fall asleep or stay asleep. Insomnia can cause fatigue, low energy, difficulty concentrating, mood swings, and poor performance at work or school.  There are three different ways to classify insomnia:  · Difficulty falling asleep.  · Difficulty staying asleep.  · Waking up too early in the morning.  Any type of insomnia can be long-term (chronic) or short-term (acute). Both are common. Short-term insomnia usually lasts for three months or less. Chronic insomnia occurs at least three times a week for longer than three months.  What are the causes?  Insomnia may be caused by another condition, situation, or substance, such as:  · Anxiety.  · Certain medicines.  · Gastroesophageal reflux disease (GERD) or other gastrointestinal conditions.  · Asthma or other breathing conditions.  · Restless legs syndrome, sleep apnea, or other sleep disorders.  · Chronic pain.  · Menopause.  · Stroke.  · Abuse of alcohol, tobacco, or illegal drugs.  · Mental health conditions, such as depression.  · Caffeine.  · Neurological disorders, such as Alzheimer's disease.  · An overactive thyroid (hyperthyroidism).  Sometimes, the cause of insomnia may not be known.  What increases the risk?  Risk factors for insomnia include:  · Gender. Women are affected more often than men.  · Age. Insomnia is more common as you get older.  · Stress.  · Lack of exercise.  · Irregular work schedule or working night shifts.  · Traveling between different time zones.  · Certain medical and mental health conditions.  What are the signs or symptoms?  If you have insomnia, the main symptom is having trouble falling asleep or having trouble staying asleep. This may lead to other symptoms, such as:  · Feeling fatigued or having low energy.  · Feeling nervous about going to sleep.  · Not feeling rested in  the morning.  · Having trouble concentrating.  · Feeling irritable, anxious, or depressed.  How is this diagnosed?  This condition may be diagnosed based on:  · Your symptoms and medical history. Your health care provider may ask about:  ? Your sleep habits.  ? Any medical conditions you have.  ? Your mental health.  · A physical exam.  How is this treated?  Treatment for insomnia depends on the cause. Treatment may focus on treating an underlying condition that is causing insomnia. Treatment may also include:  · Medicines to help you sleep.  · Counseling or therapy.  · Lifestyle adjustments to help you sleep better.  Follow these instructions at home:  Eating and drinking    · Limit or avoid alcohol, caffeinated beverages, and cigarettes, especially close to bedtime. These can disrupt your sleep.  · Do not eat a large meal or eat spicy foods right before bedtime. This can lead to digestive discomfort that can make it hard for you to sleep.  Sleep habits    · Keep a sleep diary to help you and your health care provider figure out what could be causing your insomnia. Write down:  ? When you sleep.  ? When you wake up during the night.  ? How well you sleep.  ? How rested you feel the next day.  ? Any side effects of medicines you are taking.  ? What you eat and drink.  · Make your bedroom a dark, comfortable place where it is easy to fall asleep.  ? Put up shades or blackout curtains to block light from outside.  ? Use a white noise machine to block noise.  ? Keep the temperature cool.  · Limit screen use before bedtime. This includes:  ? Watching TV.  ? Using your smartphone, tablet, or computer.  · Stick to a routine that includes going to bed and waking up at the same times every day and night. This can help you fall asleep faster. Consider making a quiet activity, such as reading, part of your nighttime routine.  · Try to avoid taking naps during the day so that you sleep better at night.  · Get out of bed if you  are still awake after 15 minutes of trying to sleep. Keep the lights down, but try reading or doing a quiet activity. When you feel sleepy, go back to bed.  General instructions  · Take over-the-counter and prescription medicines only as told by your health care provider.  · Exercise regularly, as told by your health care provider. Avoid exercise starting several hours before bedtime.  · Use relaxation techniques to manage stress. Ask your health care provider to suggest some techniques that may work well for you. These may include:  ? Breathing exercises.  ? Routines to release muscle tension.  ? Visualizing peaceful scenes.  · Make sure that you drive carefully. Avoid driving if you feel very sleepy.  · Keep all follow-up visits as told by your health care provider. This is important.  Contact a health care provider if:  · You are tired throughout the day.  · You have trouble in your daily routine due to sleepiness.  · You continue to have sleep problems, or your sleep problems get worse.  Get help right away if:  · You have serious thoughts about hurting yourself or someone else.  If you ever feel like you may hurt yourself or others, or have thoughts about taking your own life, get help right away. You can go to your nearest emergency department or call:  · Your local emergency services (911 in the [place]  · A suicide crisis helpline, such as the National Suicide Prevention Lifeline at 1-429.851.4751. This is open 24 hours a day.  Summary  · Insomnia is a sleep disorder that makes it difficult to fall asleep or stay asleep.  · Insomnia can be long-term (chronic) or short-term (acute).  · Treatment for insomnia depends on the cause. Treatment may focus on treating an underlying condition that is causing insomnia.  · Keep a sleep diary to help you and your health care provider figure out what could be causing your insomnia.  This information is not intended to replace advice given to you by your health care  provider. Make sure you discuss any questions you have with your health care provider.  Document Released: 12/15/2001 Document Revised: 11/30/2018 Document Reviewed: 09/27/2018  Elsevier Patient Education © 2020 [employer]

## 2021-01-05 LAB — ANA SER QL IA: NEGATIVE

## 2021-02-08 ENCOUNTER — OFFICE VISIT (OUTPATIENT)
Dept: PRIMARY CARE | Facility: CLINIC | Age: 66
End: 2021-02-08
Payer: COMMERCIAL

## 2021-02-08 VITALS
RESPIRATION RATE: 16 BRPM | HEART RATE: 72 BPM | HEIGHT: 65 IN | BODY MASS INDEX: 17.19 KG/M2 | SYSTOLIC BLOOD PRESSURE: 100 MMHG | WEIGHT: 103.2 LBS | DIASTOLIC BLOOD PRESSURE: 70 MMHG | OXYGEN SATURATION: 97 %

## 2021-02-08 DIAGNOSIS — H93.13 TINNITUS OF BOTH EARS: Primary | ICD-10-CM

## 2021-02-08 PROCEDURE — 99214 OFFICE O/P EST MOD 30 MIN: CPT | Performed by: NURSE PRACTITIONER

## 2021-02-08 RX ORDER — CLONAZEPAM 0.25 MG/1
0.25 TABLET, ORALLY DISINTEGRATING ORAL NIGHTLY PRN
Qty: 7 TABLET | Refills: 0 | Status: SHIPPED | OUTPATIENT
Start: 2021-02-08 | End: 2021-04-08 | Stop reason: ALTCHOICE

## 2021-02-08 RX ORDER — METHYLPREDNISOLONE 4 MG/1
TABLET ORAL
Qty: 21 TABLET | Refills: 0 | Status: SHIPPED | OUTPATIENT
Start: 2021-02-08 | End: 2021-04-08 | Stop reason: ALTCHOICE

## 2021-02-09 ASSESSMENT — ENCOUNTER SYMPTOMS
SEIZURES: 0
SLEEP DISTURBANCE: 1
BRUISES/BLEEDS EASILY: 0
ARTHRALGIAS: 0
LIGHT-HEADEDNESS: 0
FATIGUE: 0
TROUBLE SWALLOWING: 0
DIAPHORESIS: 0
DIZZINESS: 0
DECREASED CONCENTRATION: 1
ACTIVITY CHANGE: 0
MYALGIAS: 0
TREMORS: 0
FEVER: 0
NUMBNESS: 0
ADENOPATHY: 0
NECK PAIN: 0
NERVOUS/ANXIOUS: 1
DYSPHORIC MOOD: 1
FACIAL ASYMMETRY: 0
NECK STIFFNESS: 0
HALLUCINATIONS: 0
HEADACHES: 0
SINUS PRESSURE: 0
FACIAL SWELLING: 0
SORE THROAT: 0
PALPITATIONS: 0
VOICE CHANGE: 0
SHORTNESS OF BREATH: 0
CONSTIPATION: 0
ABDOMINAL PAIN: 0
BACK PAIN: 0
WEAKNESS: 0
SPEECH DIFFICULTY: 0
RHINORRHEA: 0
SINUS PAIN: 0
VOMITING: 0
COUGH: 0
HYPERACTIVE: 0
RESPIRATORY NEGATIVE: 1
CHILLS: 0
APPETITE CHANGE: 0
DIARRHEA: 0
WHEEZING: 0
CARDIOVASCULAR NEGATIVE: 1
CHEST TIGHTNESS: 0
NAUSEA: 0

## 2021-02-09 NOTE — ASSESSMENT & PLAN NOTE
· See overview, HPI, ROS, PE.  · No new symptoms or exam findings at this time.  · Discussed the patient's case with patient's PCP.  · PCP approved a short course of Medrol Dosepak and a short course of Klonopin 0.25 mg nightly as needed for sleep, anxiety, and tenderness.  · Patient was warned about the sedating side effects of Klonopin, but states she will be in a safe environment with her  watching her 24/7.  · She is very agreeable to this regimen and wishes to start it as soon as possible.  · She understands that she should keep her follow-up with neurology as planned.  · She denies any red flag symptoms at this time, but verbalizes an understanding of red flag symptoms and indications to follow-up at the ER if needed.

## 2021-02-09 NOTE — PROGRESS NOTES
Elly Owens, VAIBHAV, CRNP, FNP-BC  [facility]  [address], [address]  [place] [address]  Phone: [phone]  Fax: [phone]     History of Present Illness/Chief Complaint     64 yo female patient of Dr. Meyer, fairly new to me, presenting today for chronic tinnitus.     Bilateral tinnitus.  • Patient states bilateral tinnitus has been ongoing since October.  • She has addressed with PCP.  • She has addressed with her neurosurgeon (history of cerebral cavernoma).  • She has addressed with an ENT specialist.  • She has tried steroid tapers without significant long-term benefit.  • Imaging studies for cavernoma are up-to-date, and there were no MRI findings that were consistent/concerning for abnormalities that could be causing the tenderness (per the patient).  • She states she has an appointment with neurology scheduled for 2/17/2021, but patient cannot wait until that time to get relief.  • She states she is very desperate for treatment, and she states she has not slept well for an extended period of time due to symptoms worsened at night.     Past Medical Hx - Surgical Hx - Family Hx - Social Hx     The following have been reviewed and updated as appropriate in this visit:  Allergies  Meds  Problems         PMH  Past Medical History:   Diagnosis Date   • Osteoporosis        PSH  Past Surgical History:   Procedure Laterality Date   • BUNIONECTOMY Bilateral    • TONSILLECTOMY         PF  Family History   Problem Relation Age of Onset   • Diverticulitis Biological Mother    • Glaucoma Biological Father        SOCIAL HX  Social History     Socioeconomic History   • Marital status:      Spouse name: Not on file   • Number of children: Not on file   • Years of education: Not on file   • Highest education level: Not on file   Occupational History   • Not on file   Social Needs   • Financial resource strain: Not on file   • Food insecurity     Worry: Not on  file     Inability: Not on file   • Transportation needs     Medical: Not on file     Non-medical: Not on file   Tobacco Use   • Smoking status: Never Smoker   • Smokeless tobacco: Never Used   Substance and Sexual Activity   • Alcohol use: Yes     Frequency: Monthly or less     Comment: events   • Drug use: Not on file   • Sexual activity: Not on file   Lifestyle   • Physical activity     Days per week: Not on file     Minutes per session: Not on file   • Stress: Not on file   Relationships   • Social connections     Talks on phone: Not on file     Gets together: Not on file     Attends Scientologist service: Not on file     Active member of club or organization: Not on file     Attends meetings of clubs or organizations: Not on file     Relationship status: Not on file   • Intimate partner violence     Fear of current or ex partner: Not on file     Emotionally abused: Not on file     Physically abused: Not on file     Forced sexual activity: Not on file   Other Topics Concern   • Not on file   Social History Narrative   • Not on file        Allergies & Medications     ALLERGIES  Allergies   Allergen Reactions   • No Known Allergies        CURRENT MEDS  Current Outpatient Medications   Medication Sig Dispense Refill   • multivitamin tablet Take by mouth daily.     • carBAMazepine (TEGretol) 200 mg tablet Take 0.5 tablets (100 mg total) by mouth 2 (two) times a day.     • clonazePAM (KlonoPIN) 0.25 mg disintegrating tablet Take 1 tablet (0.25 mg total) by mouth nightly as needed (for tinnitus and sleep disturbances.). 7 tablet 0   • methylPREDNISolone (MEDROL DOSEPACK) 4 mg tablet Follow package directions. 21 tablet 0     No current facility-administered medications for this visit.         Review of Systems     Review of Systems   Constitutional: Negative for activity change, appetite change, chills, diaphoresis, fatigue and fever.   HENT: Positive for hearing loss (SNHL) and tinnitus. Negative for congestion, dental  "problem, drooling, ear discharge, ear pain, facial swelling, mouth sores, nosebleeds, postnasal drip, rhinorrhea, sinus pressure, sinus pain, sneezing, sore throat, trouble swallowing and voice change.    Respiratory: Negative.  Negative for cough, chest tightness, shortness of breath and wheezing.    Cardiovascular: Negative.  Negative for chest pain, palpitations and leg swelling.   Gastrointestinal: Negative for abdominal pain, constipation, diarrhea, nausea and vomiting.   Musculoskeletal: Negative for arthralgias, back pain, gait problem, myalgias, neck pain and neck stiffness.   Neurological: Negative for dizziness, tremors, seizures, syncope, facial asymmetry, speech difficulty, weakness, light-headedness, numbness and headaches.   Hematological: Negative for adenopathy. Does not bruise/bleed easily.   Psychiatric/Behavioral: Positive for decreased concentration, dysphoric mood and sleep disturbance. Negative for hallucinations, self-injury and suicidal ideas. The patient is nervous/anxious. The patient is not hyperactive.          Physical Examination     TODAY'S VITALS  Vitals:    02/08/21 1601   BP: 100/70   BP Location: Left upper arm   Patient Position: Sitting   Pulse: 72   Resp: 16   SpO2: 97%   Weight: 46.8 kg (103 lb 3.2 oz)   Height: 1.651 m (5' 5\")       BLOOD PRESSURE TRENDS  BP Readings from Last 2 Encounters:   02/08/21 100/70   01/04/21 100/70       HEIGHT & WEIGHT  Wt Readings from Last 2 Encounters:   02/08/21 46.8 kg (103 lb 3.2 oz)   12/22/20 44.9 kg (99 lb)     Ht Readings from Last 2 Encounters:   02/08/21 1.651 m (5' 5\")   12/22/20 1.651 m (5' 5\")       Physical Exam  Vitals signs and nursing note reviewed.   Constitutional:       General: She is not in acute distress.     Appearance: She is not ill-appearing, toxic-appearing or diaphoretic.   HENT:      Head: Normocephalic and atraumatic.      Right Ear: Hearing, tympanic membrane, ear canal and external ear normal.      Left Ear: " Hearing, tympanic membrane, ear canal and external ear normal.      Nose: Nose normal.      Mouth/Throat:      Lips: Pink.      Mouth: Mucous membranes are moist.      Pharynx: Oropharynx is clear.   Eyes:      General: Lids are normal. Lids are everted, no foreign bodies appreciated.      Conjunctiva/sclera: Conjunctivae normal.   Cardiovascular:      Rate and Rhythm: Normal rate.      Pulses: Normal pulses.           Carotid pulses are 2+ on the right side and 2+ on the left side.       Radial pulses are 2+ on the right side and 2+ on the left side.      Heart sounds: Normal heart sounds, S1 normal and S2 normal.   Pulmonary:      Effort: Pulmonary effort is normal.      Breath sounds: Normal breath sounds and air entry.   Musculoskeletal:      Right lower leg: No edema.      Left lower leg: No edema.   Skin:     General: Skin is warm and dry.      Capillary Refill: Capillary refill takes less than 2 seconds.   Neurological:      Mental Status: She is alert and oriented to person, place, and time.   Psychiatric:      Comments: Patient appears slightly anxious. She appears uncomfortable and states it is due to the tinnitus. Patient has cotton balls in the bilateral ear canals with noise canceling headphones on top.          Laboratory Results, Immunizations, and Health Maintenance     LAST CBC/DIFF  Lab Results   Component Value Date    WBC 6.02 09/18/2020    HGB 13.0 09/18/2020    HCT 38.5 09/18/2020    MCV 99.7 (H) 09/18/2020     09/18/2020        LAST BMP  Lab Results   Component Value Date    GLUCOSE 87 09/18/2020    CALCIUM 10.0 09/18/2020     (L) 09/18/2020    K 5.2 (H) 09/18/2020    CO2 26 09/18/2020    CL 99 09/18/2020    BUN 13 09/18/2020    CREATININE 0.6 09/18/2020        LAST LFTs  Lab Results   Component Value Date    ALT 26 09/17/2017    AST 31 09/17/2017    ALKPHOS 51 09/17/2017    BILITOT 0.6 09/17/2017        LAST LIPID PANEL  Lab Results   Component Value Date    CHOL 209 (H)  09/18/2017     Lab Results   Component Value Date    HDL 81 09/18/2017     Lab Results   Component Value Date    LDLCALC 117 (H) 09/18/2017     Lab Results   Component Value Date    TRIG 54 09/18/2017     No results found for: CHOLHDL     LAST THYROID STUDIES  Lab Results   Component Value Date    TSH 2.34 01/04/2021        LAST A1C  No results found for: HGBA1C    IMMUNIZATIONS ON FILE  Immunization History   Administered Date(s) Administered   • Influenza Vaccine Quadrivalent 3 Yr And Older 11/01/2016   • Influenza Vaccine Quadrivalent Preservative Free 6 Mons and Up 11/08/2019   • Influenza, Unspecified 11/01/2016, 11/15/2016   • Tdap 08/26/2015        Assessment and Plan     Assessment/Plan     Problem List Items Addressed This Visit        Nervous    Tinnitus of both ears - Primary    Current Assessment & Plan     · See overview, HPI, ROS, PE.  · No new symptoms or exam findings at this time.  · Discussed the patient's case with patient's PCP.  · PCP approved a short course of Medrol Dosepak and a short course of Klonopin 0.25 mg nightly as needed for sleep, anxiety, and tenderness.  · Patient was warned about the sedating side effects of Klonopin, but states she will be in a safe environment with her  watching her 24/7.  · She is very agreeable to this regimen and wishes to start it as soon as possible.  · She understands that she should keep her follow-up with neurology as planned.  · She denies any red flag symptoms at this time, but verbalizes an understanding of red flag symptoms and indications to follow-up at the ER if needed.               No follow-ups on file.    No orders of the defined types were placed in this encounter.           Elly Owens, VAIBHAV, CRNP, FNP-BC    2/9/2021

## 2021-04-08 ENCOUNTER — OFFICE VISIT (OUTPATIENT)
Dept: OTOLARYNGOLOGY | Facility: CLINIC | Age: 66
End: 2021-04-08
Payer: COMMERCIAL

## 2021-04-08 VITALS
WEIGHT: 110 LBS | DIASTOLIC BLOOD PRESSURE: 60 MMHG | BODY MASS INDEX: 18.33 KG/M2 | HEART RATE: 55 BPM | HEIGHT: 65 IN | OXYGEN SATURATION: 98 % | SYSTOLIC BLOOD PRESSURE: 80 MMHG | TEMPERATURE: 96.3 F

## 2021-04-08 DIAGNOSIS — J31.0 CHRONIC RHINITIS: ICD-10-CM

## 2021-04-08 DIAGNOSIS — H93.13 TINNITUS OF BOTH EARS: Primary | ICD-10-CM

## 2021-04-08 DIAGNOSIS — H90.3 SENSORINEURAL HEARING LOSS (SNHL) OF BOTH EARS: ICD-10-CM

## 2021-04-08 DIAGNOSIS — H93.233 HYPERACUSIS OF BOTH EARS: ICD-10-CM

## 2021-04-08 PROCEDURE — 99214 OFFICE O/P EST MOD 30 MIN: CPT | Performed by: OTOLARYNGOLOGY

## 2021-04-08 RX ORDER — CHOLECALCIFEROL (VITAMIN D3) 10(400)/ML
400 DROPS ORAL DAILY
COMMUNITY
End: 2023-01-05 | Stop reason: SDUPTHER

## 2021-04-08 ASSESSMENT — ENCOUNTER SYMPTOMS
NERVOUS/ANXIOUS: 1
COUGH: 0
WEAKNESS: 0
DIZZINESS: 0
FEVER: 0
ADENOPATHY: 0
ARTHRALGIAS: 0
VOMITING: 0
BACK PAIN: 0
SLEEP DISTURBANCE: 0
RHINORRHEA: 0
SINUS PRESSURE: 0
NAUSEA: 0
POLYPHAGIA: 0
TROUBLE SWALLOWING: 0
SHORTNESS OF BREATH: 0
HEADACHES: 0
VOICE CHANGE: 0
FATIGUE: 0
DIARRHEA: 0
PALPITATIONS: 0
CONSTIPATION: 0
FREQUENCY: 0
MYALGIAS: 0
WHEEZING: 0
DYSPHORIC MOOD: 1

## 2021-04-08 NOTE — PROGRESS NOTES
[facility]  [address], [address]  [place] [address]  Phone: [phone]  Fax: [phone]      Patient ID: Nohemy Machuca                              : 1955    Visit Date: 2021  Referring Provider: Ginger Gonzales DO    Chief Complaint: Tinnitus and Trigeminal Neuralgia      Nohemy Machuca returned to the [place] office today in regard to history of bothersome tinnitus, hyperacusis and trigeminal neuralgia    Nohemy has been under the care of Dr. Marva Damon for left-sided painful trigeminal neuralgia following a dental procedure and bilateral tinnitus.  Apparently, the patient was involved in a dental implant procedure and when that implant broke during the procedure, drilling was required for 3 hours.  Fifteen days later, the patient developed severe tinnitus worse on the left than the right.  The patient's symptoms negatively affect her quality of life.  She can't even have meals with her family members.  She has been treated with an array of medications already including amitriptyline, nortriptyline, carbamazepine, cyclobenzaprine, steroids, clonazepam, naltrexone and duloxetine.  She also tried Lipo flavonoids which were unsuccessful.  She most recently has been given medical marijuana and mirtazapine.  Also, amitriptyline was recommended once again but she has refused this because of possible cardiac risk.    Nohemy has already seen Dr. Darryl Poon at [facility] who recommended tinnitus retraining therapy and possible amplification for hearing loss.  When we had tested her previously in our office she had only high-frequency sensorineural hearing loss for which amplification would not be offered.  She has not been retested.  She does not want to be retested because she experiences hyperacusis and struggles with the sound presented during an audiogram.    At this point, Nohemy came to see me just to see if I had any other ideas about what to do for tinnitus and hyperacusis.   "She is not using any medications for these entities at this time because she simply cannot tolerate them.  She came into the office today with soft earplugs in the canals and nonfunctional headphones over her ears being used as sound muffs.    Review of Systems   Constitutional: Negative for fatigue and fever.   HENT: Positive for hearing loss and tinnitus. Negative for congestion, nosebleeds, postnasal drip, rhinorrhea, sinus pressure, trouble swallowing and voice change.         Trigeminal neuralgia on the right.   Eyes: Negative for visual disturbance.   Respiratory: Negative for cough, shortness of breath and wheezing.    Cardiovascular: Negative for chest pain and palpitations.   Gastrointestinal: Negative for constipation, diarrhea, nausea and vomiting.   Endocrine: Negative for polyphagia and polyuria.   Genitourinary: Negative for frequency.   Musculoskeletal: Negative for arthralgias, back pain, gait problem and myalgias.   Skin: Negative for rash.   Allergic/Immunologic: Negative for environmental allergies.   Neurological: Negative for dizziness, weakness and headaches.   Hematological: Negative for adenopathy.   Psychiatric/Behavioral: Positive for dysphoric mood. Negative for sleep disturbance. The patient is nervous/anxious.        Current Medications: Cholecalciferol (vitamin d3) and multivitamin.    Physical Exam:  Vitals:   Visit Vitals  BP (!) 80/60 (BP Location: Left upper arm, Patient Position: Sitting)   Pulse (!) 55   Temp (!) 35.7 °C (96.3 °F) (Temporal)   Ht 1.651 m (5' 5\")   Wt 49.9 kg (110 lb)   SpO2 98%   BMI 18.30 kg/m²     General:  Well-developed, very thin 65 y.o. who is anxious and depressed today  Voice: Normal without hoarseness, breathiness or stridor.  Head/face:  No scars or lesions.  No parotid masses. No presinus tenderness. Seventh cranial nerves intact.  Eyes:  Extraocular movements and gaze alignment normal.  Ears: Auricles normal.  External auditory canals free of cerumen.  " "Tympanic membranes clear, mobile and without retraction or scar.  Nose:  Dorsum straight.  Septum sinusoidal.  Turbinates edematous but normal in size and orientation.  No pus, polyps or crusts.  Oral Cavity/oropharynx: Normal tongue thrust. Normal gag reflex. No masses, leukoplakia, erythroplakia, ulcers or other abnormalities at the tongue, floor of mouth, buccal mucosa, palate or posterior pharyngeal wall.  Larynx/hypopharynx: Examination deferred due to the pandemic.    Neck:  No masses, adenopathy, cervical spasm or thyroid enlargement.  Cranial Nerves II through XII: Grossly intact.  Mental status: Awake, alert and oriented ×3.  No anxiety or depression.      Impression:  1.  Hyperacusis.  The patient cannot even tolerate repeat audiogram at this time because this triggers more loud tinnitus and persistent symptoms for several days.    2.  Intractable tinnitus.  3.  Right trigeminal neuralgia.  Patient seems less focused on the pain right now than on the ear related symptoms.  4.  Secondary anxious depression.    Recommendations and Plan:  1.  Tinnitus retraining at home: Never be in silence.  Avoid caffeine.  Drink at least 48 ounces of water per day.  Use your smart phone or smart speaker to play relaxation apps at a quiet listening level 24 hours/day.  Create a \"sound-scape\" in your home.  I recommend the apps \"Calm\", \"Spa\", \"Relax\" and \"Mindfulness\".  2.  See Dr. Ruth Saini, an audiologist in [place] who can help you with formal tinnitus retraining therapy.  3.  Consider hypnosis and acupuncture: [facility] or [place].  4.  Talk to Dr. Marva Damon about other medications if you desire.  5.  Simply Saline, 2 squirts to each nostril twice daily.  6.  Follow-up in 1 year or sooner as needed.        Gala Starkey MD              "

## 2021-04-08 NOTE — PATIENT INSTRUCTIONS
"1.  Tinnitus retraining at home: Never be in silence.  Avoid caffeine.  Drink at least 48 ounces of water per day.  Use your smart phone or smart speaker to play relaxation apps at a quiet listening level 24 hours/day.  I recommend the apps \"Calm\", \"Spa\", \"Relax\" and \"Mindfulness\".  2.  See Dr. Ruth Saini, an audiologist in [place] who can help you with formal tinnitus retraining therapy.  3.  Consider hypnosis and acupuncture: [facility] or [facility].  4.  Talk to Dr. Marva Damon about other medications if you desire.  5.  Simply Saline, 2 squirts to each nostril twice daily.  "

## 2021-04-16 ENCOUNTER — APPOINTMENT (EMERGENCY)
Dept: RADIOLOGY | Facility: HOSPITAL | Age: 66
End: 2021-04-16
Attending: STUDENT IN AN ORGANIZED HEALTH CARE EDUCATION/TRAINING PROGRAM
Payer: COMMERCIAL

## 2021-04-16 ENCOUNTER — HOSPITAL ENCOUNTER (OUTPATIENT)
Facility: HOSPITAL | Age: 66
Setting detail: OBSERVATION
Discharge: HOME | End: 2021-04-17
Attending: EMERGENCY MEDICINE | Admitting: HOSPITALIST
Payer: COMMERCIAL

## 2021-04-16 DIAGNOSIS — G43.109 COMPLICATED MIGRAINE: ICD-10-CM

## 2021-04-16 DIAGNOSIS — R47.01 EXPRESSIVE APHASIA: ICD-10-CM

## 2021-04-16 DIAGNOSIS — G45.9 TIA (TRANSIENT ISCHEMIC ATTACK): Primary | ICD-10-CM

## 2021-04-16 LAB
ALBUMIN SERPL-MCNC: 4.8 G/DL (ref 3.4–5)
ALP SERPL-CCNC: 70 IU/L (ref 35–126)
ALT SERPL-CCNC: 30 IU/L (ref 11–54)
ANION GAP SERPL CALC-SCNC: 12 MEQ/L (ref 3–15)
AST SERPL-CCNC: 38 IU/L (ref 15–41)
BACTERIA URNS QL MICRO: NORMAL /HPF
BASOPHILS # BLD: 0.03 K/UL (ref 0.01–0.1)
BASOPHILS NFR BLD: 0.4 %
BILIRUB SERPL-MCNC: 0.6 MG/DL (ref 0.3–1.2)
BILIRUB UR QL STRIP.AUTO: NEGATIVE MG/DL
BUN SERPL-MCNC: 19 MG/DL (ref 8–20)
CALCIUM SERPL-MCNC: 10 MG/DL (ref 8.9–10.3)
CHLORIDE SERPL-SCNC: 95 MEQ/L (ref 98–109)
CHLORIDE UR-SCNC: 20 MEQ/L
CLARITY UR REFRACT.AUTO: CLEAR
CO2 SERPL-SCNC: 21 MEQ/L (ref 22–32)
COLOR UR AUTO: YELLOW
CREAT SERPL-MCNC: 0.6 MG/DL (ref 0.6–1.1)
CREAT UR-MCNC: 10.6 MG/DL
DIFFERENTIAL METHOD BLD: ABNORMAL
EOSINOPHIL # BLD: 0.19 K/UL (ref 0.04–0.36)
EOSINOPHIL NFR BLD: 2.2 %
ERYTHROCYTE [DISTWIDTH] IN BLOOD BY AUTOMATED COUNT: 11.9 % (ref 11.7–14.4)
GFR SERPL CREATININE-BSD FRML MDRD: >60 ML/MIN/1.73M*2
GLUCOSE SERPL-MCNC: 119 MG/DL (ref 70–99)
GLUCOSE UR STRIP.AUTO-MCNC: NEGATIVE MG/DL
HCT VFR BLDCO AUTO: 40.3 % (ref 35–45)
HGB BLD-MCNC: 14.4 G/DL (ref 11.8–15.7)
HGB UR QL STRIP.AUTO: 1
HYALINE CASTS #/AREA URNS LPF: NORMAL /LPF
IMM GRANULOCYTES # BLD AUTO: 0.02 K/UL (ref 0–0.08)
IMM GRANULOCYTES NFR BLD AUTO: 0.2 %
KETONES UR STRIP.AUTO-MCNC: NEGATIVE MG/DL
LEUKOCYTE ESTERASE UR QL STRIP.AUTO: NEGATIVE
LYMPHOCYTES # BLD: 4.15 K/UL (ref 1.2–3.5)
LYMPHOCYTES NFR BLD: 49.1 %
MCH RBC QN AUTO: 32.7 PG (ref 28–33.2)
MCHC RBC AUTO-ENTMCNC: 35.7 G/DL (ref 32.2–35.5)
MCV RBC AUTO: 91.4 FL (ref 83–98)
MONOCYTES # BLD: 0.64 K/UL (ref 0.28–0.8)
MONOCYTES NFR BLD: 7.6 %
NEUTROPHILS # BLD: 3.42 K/UL (ref 1.7–7)
NEUTS SEG NFR BLD: 40.5 %
NITRITE UR QL STRIP.AUTO: NEGATIVE
NRBC BLD-RTO: 0 %
OSMOLALITY SERPL: 289 MOSM/KG (ref 280–300)
OSMOLALITY UR: 141 MOSM/KG (ref 100–1400)
PDW BLD AUTO: 9.5 FL (ref 9.4–12.3)
PH UR STRIP.AUTO: 7 [PH]
PLATELET # BLD AUTO: 227 K/UL (ref 150–369)
POTASSIUM SERPL-SCNC: 3.8 MEQ/L (ref 3.6–5.1)
POTASSIUM UR-SCNC: 13.6 MEQ/L
PROT SERPL-MCNC: 7.6 G/DL (ref 6–8.2)
PROT UR QL STRIP.AUTO: NEGATIVE
RBC # BLD AUTO: 4.41 M/UL (ref 3.93–5.22)
RBC #/AREA URNS HPF: NORMAL /HPF
SARS-COV-2 RNA RESP QL NAA+PROBE: NEGATIVE
SODIUM SERPL-SCNC: 128 MEQ/L (ref 136–144)
SODIUM UR-SCNC: 20 MEQ/L
SP GR UR REFRACT.AUTO: 1.01
SQUAMOUS URNS QL MICRO: NORMAL /HPF
TROPONIN I SERPL-MCNC: <0.02 NG/ML
TSH SERPL DL<=0.05 MIU/L-ACNC: 4.56 MIU/L (ref 0.34–5.6)
UROBILINOGEN UR STRIP-ACNC: 0.2 EU/DL
WBC # BLD AUTO: 8.45 K/UL (ref 3.8–10.5)
WBC #/AREA URNS HPF: NORMAL /HPF

## 2021-04-16 PROCEDURE — 99219 PR INITIAL OBSERVATION CARE/DAY 50 MINUTES: CPT | Performed by: HOSPITALIST

## 2021-04-16 PROCEDURE — 36415 COLL VENOUS BLD VENIPUNCTURE: CPT | Performed by: STUDENT IN AN ORGANIZED HEALTH CARE EDUCATION/TRAINING PROGRAM

## 2021-04-16 PROCEDURE — 63700000 HC SELF-ADMINISTRABLE DRUG

## 2021-04-16 PROCEDURE — 84484 ASSAY OF TROPONIN QUANT: CPT | Mod: 91 | Performed by: PHYSICIAN ASSISTANT

## 2021-04-16 PROCEDURE — 83935 ASSAY OF URINE OSMOLALITY: CPT | Performed by: STUDENT IN AN ORGANIZED HEALTH CARE EDUCATION/TRAINING PROGRAM

## 2021-04-16 PROCEDURE — 99285 EMERGENCY DEPT VISIT HI MDM: CPT | Mod: 25

## 2021-04-16 PROCEDURE — 85025 COMPLETE CBC W/AUTO DIFF WBC: CPT | Performed by: STUDENT IN AN ORGANIZED HEALTH CARE EDUCATION/TRAINING PROGRAM

## 2021-04-16 PROCEDURE — 25800000 HC PHARMACY IV SOLUTIONS: Performed by: STUDENT IN AN ORGANIZED HEALTH CARE EDUCATION/TRAINING PROGRAM

## 2021-04-16 PROCEDURE — 70450 CT HEAD/BRAIN W/O DYE: CPT | Mod: ME

## 2021-04-16 PROCEDURE — 84484 ASSAY OF TROPONIN QUANT: CPT | Performed by: STUDENT IN AN ORGANIZED HEALTH CARE EDUCATION/TRAINING PROGRAM

## 2021-04-16 PROCEDURE — 84443 ASSAY THYROID STIM HORMONE: CPT | Performed by: STUDENT IN AN ORGANIZED HEALTH CARE EDUCATION/TRAINING PROGRAM

## 2021-04-16 PROCEDURE — 80053 COMPREHEN METABOLIC PANEL: CPT | Performed by: STUDENT IN AN ORGANIZED HEALTH CARE EDUCATION/TRAINING PROGRAM

## 2021-04-16 PROCEDURE — 96361 HYDRATE IV INFUSION ADD-ON: CPT

## 2021-04-16 PROCEDURE — 80048 BASIC METABOLIC PNL TOTAL CA: CPT | Performed by: PHYSICIAN ASSISTANT

## 2021-04-16 PROCEDURE — 84133 ASSAY OF URINE POTASSIUM: CPT | Performed by: STUDENT IN AN ORGANIZED HEALTH CARE EDUCATION/TRAINING PROGRAM

## 2021-04-16 PROCEDURE — G0378 HOSPITAL OBSERVATION PER HR: HCPCS

## 2021-04-16 PROCEDURE — G1004 CDSM NDSC: HCPCS

## 2021-04-16 PROCEDURE — 82570 ASSAY OF URINE CREATININE: CPT | Performed by: STUDENT IN AN ORGANIZED HEALTH CARE EDUCATION/TRAINING PROGRAM

## 2021-04-16 PROCEDURE — 71045 X-RAY EXAM CHEST 1 VIEW: CPT

## 2021-04-16 PROCEDURE — 93005 ELECTROCARDIOGRAM TRACING: CPT | Performed by: STUDENT IN AN ORGANIZED HEALTH CARE EDUCATION/TRAINING PROGRAM

## 2021-04-16 PROCEDURE — U0002 COVID-19 LAB TEST NON-CDC: HCPCS | Performed by: STUDENT IN AN ORGANIZED HEALTH CARE EDUCATION/TRAINING PROGRAM

## 2021-04-16 PROCEDURE — 81003 URINALYSIS AUTO W/O SCOPE: CPT | Performed by: STUDENT IN AN ORGANIZED HEALTH CARE EDUCATION/TRAINING PROGRAM

## 2021-04-16 PROCEDURE — 63700000 HC SELF-ADMINISTRABLE DRUG: Performed by: STUDENT IN AN ORGANIZED HEALTH CARE EDUCATION/TRAINING PROGRAM

## 2021-04-16 PROCEDURE — 83930 ASSAY OF BLOOD OSMOLALITY: CPT | Performed by: STUDENT IN AN ORGANIZED HEALTH CARE EDUCATION/TRAINING PROGRAM

## 2021-04-16 PROCEDURE — 96360 HYDRATION IV INFUSION INIT: CPT

## 2021-04-16 PROCEDURE — 63700000 HC SELF-ADMINISTRABLE DRUG: Performed by: PHYSICIAN ASSISTANT

## 2021-04-16 RX ORDER — CHOLECALCIFEROL (VITAMIN D3) 10(400)/ML
400 DROPS ORAL DAILY
Status: DISCONTINUED | OUTPATIENT
Start: 2021-04-17 | End: 2021-04-16

## 2021-04-16 RX ORDER — DEXTROSE 40 %
15-30 GEL (GRAM) ORAL AS NEEDED
Status: DISCONTINUED | OUTPATIENT
Start: 2021-04-16 | End: 2021-04-17 | Stop reason: HOSPADM

## 2021-04-16 RX ORDER — FAMOTIDINE 10 MG/ML
20 INJECTION INTRAVENOUS ONCE AS NEEDED
Status: DISCONTINUED | OUTPATIENT
Start: 2021-04-16 | End: 2021-04-16

## 2021-04-16 RX ORDER — CYANOCOBALAMIN (VITAMIN B-12) 500 MCG
400 TABLET ORAL DAILY
Status: DISCONTINUED | OUTPATIENT
Start: 2021-04-17 | End: 2021-04-17 | Stop reason: HOSPADM

## 2021-04-16 RX ORDER — ONDANSETRON 4 MG/1
4 TABLET, ORALLY DISINTEGRATING ORAL EVERY 8 HOURS PRN
Status: DISCONTINUED | OUTPATIENT
Start: 2021-04-16 | End: 2021-04-17 | Stop reason: HOSPADM

## 2021-04-16 RX ORDER — ASPIRIN 325 MG
325 TABLET ORAL ONCE
Status: COMPLETED | OUTPATIENT
Start: 2021-04-16 | End: 2021-04-16

## 2021-04-16 RX ORDER — EPINEPHRINE 1 MG/ML
0.3 INJECTION, SOLUTION INTRAMUSCULAR; SUBCUTANEOUS
Status: DISCONTINUED | OUTPATIENT
Start: 2021-04-16 | End: 2021-04-16

## 2021-04-16 RX ORDER — ACETAMINOPHEN 325 MG/1
650 TABLET ORAL EVERY 4 HOURS PRN
Status: DISCONTINUED | OUTPATIENT
Start: 2021-04-16 | End: 2021-04-17 | Stop reason: HOSPADM

## 2021-04-16 RX ORDER — DIPHENHYDRAMINE HCL 50 MG/ML
50 VIAL (ML) INJECTION ONCE AS NEEDED
Status: DISCONTINUED | OUTPATIENT
Start: 2021-04-16 | End: 2021-04-16

## 2021-04-16 RX ORDER — ACETAMINOPHEN 325 MG/1
TABLET ORAL
Status: COMPLETED
Start: 2021-04-16 | End: 2021-04-16

## 2021-04-16 RX ORDER — ACETAMINOPHEN 500 MG
5 TABLET ORAL NIGHTLY
Status: DISCONTINUED | OUTPATIENT
Start: 2021-04-16 | End: 2021-04-17 | Stop reason: HOSPADM

## 2021-04-16 RX ORDER — SODIUM CHLORIDE 9 MG/ML
INJECTION, SOLUTION INTRAVENOUS CONTINUOUS
Status: DISCONTINUED | OUTPATIENT
Start: 2021-04-16 | End: 2021-04-17

## 2021-04-16 RX ORDER — IBUPROFEN 200 MG
16-32 TABLET ORAL AS NEEDED
Status: DISCONTINUED | OUTPATIENT
Start: 2021-04-16 | End: 2021-04-17 | Stop reason: HOSPADM

## 2021-04-16 RX ORDER — ONDANSETRON HYDROCHLORIDE 2 MG/ML
4 INJECTION, SOLUTION INTRAVENOUS EVERY 8 HOURS PRN
Status: DISCONTINUED | OUTPATIENT
Start: 2021-04-16 | End: 2021-04-17 | Stop reason: HOSPADM

## 2021-04-16 RX ORDER — DEXTROSE 50 % IN WATER (D50W) INTRAVENOUS SYRINGE
25 AS NEEDED
Status: DISCONTINUED | OUTPATIENT
Start: 2021-04-16 | End: 2021-04-17 | Stop reason: HOSPADM

## 2021-04-16 RX ADMIN — ACETAMINOPHEN 650 MG: 325 TABLET ORAL at 23:08

## 2021-04-16 RX ADMIN — ACETAMINOPHEN 650 MG: 325 TABLET, FILM COATED ORAL at 23:08

## 2021-04-16 RX ADMIN — SODIUM CHLORIDE: 9 INJECTION, SOLUTION INTRAVENOUS at 19:54

## 2021-04-16 RX ADMIN — Medication 5 MG: at 22:50

## 2021-04-16 RX ADMIN — ASPIRIN 325 MG ORAL TABLET 325 MG: 325 PILL ORAL at 19:42

## 2021-04-16 ASSESSMENT — ENCOUNTER SYMPTOMS
CHILLS: 0
FEVER: 0
SORE THROAT: 0
SHORTNESS OF BREATH: 0
ABDOMINAL PAIN: 0
SEIZURES: 0
NECK STIFFNESS: 0
DYSURIA: 0

## 2021-04-16 NOTE — ED PROVIDER NOTES
Emergency Medicine Note  HPI   HISTORY OF PRESENT ILLNESS     HPI     65F hx of cerebral cavernoma and tinnitus presenting for acute onset of blurred vision and expressive aphasia that started at 5:30PM. Patient was passenger in car with  driving when she said she suddenly was seeing 4 or 5 overlayed images in her vision. She then began speaking with stuttering speech that was incomprehensible.  states she has never had these symptoms before. She was in her usual state of health until this started.       Patient History   PAST HISTORY     Reviewed from Nursing Triage:      Past Medical History:   Diagnosis Date   • Cerebral cavernoma    • History of tinnitus    • Osteoporosis        Past Surgical History:   Procedure Laterality Date   • BUNIONECTOMY Bilateral    • TONSILLECTOMY         Family History   Problem Relation Age of Onset   • Diverticulitis Biological Mother    • Glaucoma Biological Father        Social History     Tobacco Use   • Smoking status: Never Smoker   • Smokeless tobacco: Never Used   Substance Use Topics   • Alcohol use: Yes     Comment: events   • Drug use: Never         Review of Systems   REVIEW OF SYSTEMS     Review of Systems   Constitutional: Negative for chills and fever.   HENT: Negative for sore throat.    Eyes: Negative for visual disturbance.   Respiratory: Negative for shortness of breath.    Cardiovascular: Negative for chest pain.   Gastrointestinal: Negative for abdominal pain.   Genitourinary: Negative for dysuria.   Musculoskeletal: Negative for neck stiffness.   Skin: Negative for rash.   Neurological: Negative for seizures.   All other systems reviewed and are negative.        VITALS     ED Vitals    Date/Time Temp Pulse Resp BP SpO2 [facility]   04/16/21 1804 -- 72 20 -- 100 % MG                       Physical Exam   PHYSICAL EXAM     Physical Exam  Constitutional:       Appearance: Normal appearance.   HENT:      Head: Normocephalic and atraumatic.      Nose: Nose  normal.      Mouth/Throat:      Mouth: Mucous membranes are dry.   Eyes:      Extraocular Movements: Extraocular movements intact.      Pupils: Pupils are equal, round, and reactive to light.   Cardiovascular:      Rate and Rhythm: Normal rate and regular rhythm.      Pulses: Normal pulses.      Heart sounds: Normal heart sounds.   Pulmonary:      Effort: Pulmonary effort is normal.      Breath sounds: Normal breath sounds.   Abdominal:      General: Abdomen is flat.      Palpations: Abdomen is soft.   Musculoskeletal:         General: No deformity or signs of injury.      Cervical back: Normal range of motion and neck supple.   Skin:     General: Skin is warm and dry.      Capillary Refill: Capillary refill takes less than 2 seconds.   Neurological:      Mental Status: She is alert.      Comments: Appears tremulous and nervous.  Nearly ncomprehensible speech with occasional appropriate words. No facial droop. Cannot identify a pen or watch. No drift in any extremity.   Follows commands intermittently.  NIHSS 5   Psychiatric:         Mood and Affect: Mood normal.           PROCEDURES     Procedures     DATA     Results     None          Imaging Results    None         No orders to display       Scoring tools                                 ED Course & MDM   MDM / ED COURSE and CLINICAL IMPRESSIONS     MDM    65F hx of cerebral cavernoma presenting for expressive aphasia and blurred vision. Made a stroke alert for ongoing symptoms, as she would be in the window for tPA potentially. However symptoms resolved after CT scan. Suspect TIA versus complex migraine. Neurology consulted and decision made to forego tPA administration given resolved symptoms. Will give 324mg aspirin and admit for TIA workup.    ED Course as of Apr 16 1945 Fri Apr 16, 2021   1821 NIHSS 5    [AS]   1828 Spoke to Dr. Cornejo with neurology. Agrees with stroke head CT scans and requests a call back when imaging is done.     [AS]   1842 NIHSS 5.   CT scan head, non-contrast, clinician reading is no bleed.  Call placed to pharmacy to start getting TPA ready.    [MS]   1842 No acute findings   CT HEAD STROKE ALERT [AS]   1848 On return from CT scan, patient is conversant with minimal effort.  Able to name things she could not name before.  Able to say she is a hospital, [facility], and that the year is 2021.    [MS]   1850 IMPRESSION:  No acute intracranial process.  Patient's known small left thalamic cavernoma  again seen.   CT HEAD STROKE ALERT [AS]   1857 Spoke with Dr. Cornejo.  No TPA indicated as NIH SS is now 0.    [MS]   1901 Per neurology - should be treated as a TIA. Admit to [facility].    [AS]   1907 Sodium(!): 128 [AS]   1932 IMPRESSION: No acute pulmonary process.   X-RAY CHEST 1 VIEW [AS]      ED Course User Index  [AS] Mehdi Thomas MD  [MS] Christian Mcarthur MD Schefkind, Adam, MD  Resident  04/16/21 2024

## 2021-04-17 ENCOUNTER — APPOINTMENT (OUTPATIENT)
Dept: RADIOLOGY | Facility: HOSPITAL | Age: 66
Setting detail: OBSERVATION
End: 2021-04-17
Attending: PSYCHIATRY & NEUROLOGY
Payer: COMMERCIAL

## 2021-04-17 VITALS
WEIGHT: 111 LBS | TEMPERATURE: 98.3 F | HEIGHT: 67 IN | OXYGEN SATURATION: 100 % | BODY MASS INDEX: 17.42 KG/M2 | HEART RATE: 62 BPM | RESPIRATION RATE: 16 BRPM | SYSTOLIC BLOOD PRESSURE: 94 MMHG | DIASTOLIC BLOOD PRESSURE: 50 MMHG

## 2021-04-17 PROBLEM — E87.1 HYPONATREMIA: Status: ACTIVE | Noted: 2021-04-17

## 2021-04-17 LAB
ANION GAP SERPL CALC-SCNC: 6 MEQ/L (ref 3–15)
ANION GAP SERPL CALC-SCNC: 7 MEQ/L (ref 3–15)
ANION GAP SERPL CALC-SCNC: 8 MEQ/L (ref 3–15)
ATRIAL RATE: 77
BUN SERPL-MCNC: 12 MG/DL (ref 8–20)
BUN SERPL-MCNC: 15 MG/DL (ref 8–20)
BUN SERPL-MCNC: 15 MG/DL (ref 8–20)
CALCIUM SERPL-MCNC: 9 MG/DL (ref 8.9–10.3)
CALCIUM SERPL-MCNC: 9 MG/DL (ref 8.9–10.3)
CALCIUM SERPL-MCNC: 9.3 MG/DL (ref 8.9–10.3)
CHLORIDE SERPL-SCNC: 100 MEQ/L (ref 98–109)
CHLORIDE SERPL-SCNC: 102 MEQ/L (ref 98–109)
CHLORIDE SERPL-SCNC: 104 MEQ/L (ref 98–109)
CHOLEST SERPL-MCNC: 191 MG/DL
CO2 SERPL-SCNC: 25 MEQ/L (ref 22–32)
CO2 SERPL-SCNC: 26 MEQ/L (ref 22–32)
CO2 SERPL-SCNC: 28 MEQ/L (ref 22–32)
CREAT SERPL-MCNC: 0.5 MG/DL (ref 0.6–1.1)
CREAT SERPL-MCNC: 0.5 MG/DL (ref 0.6–1.1)
CREAT SERPL-MCNC: 0.6 MG/DL (ref 0.6–1.1)
ERYTHROCYTE [DISTWIDTH] IN BLOOD BY AUTOMATED COUNT: 11.9 % (ref 11.7–14.4)
EST. AVERAGE GLUCOSE BLD GHB EST-MCNC: 91 MG/DL
GFR SERPL CREATININE-BSD FRML MDRD: >60 ML/MIN/1.73M*2
GLUCOSE BLD-MCNC: 84 MG/DL (ref 70–99)
GLUCOSE BLD-MCNC: 87 MG/DL (ref 70–99)
GLUCOSE SERPL-MCNC: 87 MG/DL (ref 70–99)
GLUCOSE SERPL-MCNC: 88 MG/DL (ref 70–99)
GLUCOSE SERPL-MCNC: 91 MG/DL (ref 70–99)
HBA1C MFR BLD HPLC: 4.8 %
HCT VFR BLDCO AUTO: 38.3 % (ref 35–45)
HDLC SERPL-MCNC: 71 MG/DL
HDLC SERPL: 2.7 {RATIO}
HGB BLD-MCNC: 13.1 G/DL (ref 11.8–15.7)
LDLC SERPL CALC-MCNC: 113 MG/DL
MCH RBC QN AUTO: 32.4 PG (ref 28–33.2)
MCHC RBC AUTO-ENTMCNC: 34.2 G/DL (ref 32.2–35.5)
MCV RBC AUTO: 94.8 FL (ref 83–98)
NONHDLC SERPL-MCNC: 120 MG/DL
P AXIS: 64
PDW BLD AUTO: 9.5 FL (ref 9.4–12.3)
PLATELET # BLD AUTO: 185 K/UL (ref 150–369)
POCT TEST: NORMAL
POCT TEST: NORMAL
POTASSIUM SERPL-SCNC: 3.9 MEQ/L (ref 3.6–5.1)
POTASSIUM SERPL-SCNC: 4.3 MEQ/L (ref 3.6–5.1)
POTASSIUM SERPL-SCNC: 4.4 MEQ/L (ref 3.6–5.1)
PR INTERVAL: 124
QRS DURATION: 72
QT INTERVAL: 384
QTC CALCULATION(BAZETT): 434
R AXIS: 79
RBC # BLD AUTO: 4.04 M/UL (ref 3.93–5.22)
SODIUM SERPL-SCNC: 134 MEQ/L (ref 136–144)
SODIUM SERPL-SCNC: 134 MEQ/L (ref 136–144)
SODIUM SERPL-SCNC: 138 MEQ/L (ref 136–144)
T WAVE AXIS: 54
TRIGL SERPL-MCNC: 37 MG/DL (ref 30–149)
TROPONIN I SERPL-MCNC: <0.02 NG/ML
TROPONIN I SERPL-MCNC: <0.02 NG/ML
VENTRICULAR RATE: 77
WBC # BLD AUTO: 4.9 K/UL (ref 3.8–10.5)

## 2021-04-17 PROCEDURE — G0378 HOSPITAL OBSERVATION PER HR: HCPCS

## 2021-04-17 PROCEDURE — 80061 LIPID PANEL: CPT | Performed by: PHYSICIAN ASSISTANT

## 2021-04-17 PROCEDURE — 84484 ASSAY OF TROPONIN QUANT: CPT | Performed by: PHYSICIAN ASSISTANT

## 2021-04-17 PROCEDURE — 70496 CT ANGIOGRAPHY HEAD: CPT | Mod: ME

## 2021-04-17 PROCEDURE — 99217 PR OBSERVATION CARE DISCHARGE MANAGEMENT: CPT | Performed by: HOSPITALIST

## 2021-04-17 PROCEDURE — 83036 HEMOGLOBIN GLYCOSYLATED A1C: CPT | Performed by: PHYSICIAN ASSISTANT

## 2021-04-17 PROCEDURE — 97161 PT EVAL LOW COMPLEX 20 MIN: CPT | Mod: GP

## 2021-04-17 PROCEDURE — 80048 BASIC METABOLIC PNL TOTAL CA: CPT | Performed by: PHYSICIAN ASSISTANT

## 2021-04-17 PROCEDURE — 25800000 HC PHARMACY IV SOLUTIONS: Performed by: HOSPITALIST

## 2021-04-17 PROCEDURE — 70498 CT ANGIOGRAPHY NECK: CPT

## 2021-04-17 PROCEDURE — 36415 COLL VENOUS BLD VENIPUNCTURE: CPT | Performed by: PHYSICIAN ASSISTANT

## 2021-04-17 PROCEDURE — 97166 OT EVAL MOD COMPLEX 45 MIN: CPT | Mod: GO

## 2021-04-17 PROCEDURE — 85027 COMPLETE CBC AUTOMATED: CPT | Performed by: PHYSICIAN ASSISTANT

## 2021-04-17 PROCEDURE — 63600105 HC IODINE BASED CONTRAST: Performed by: PSYCHIATRY & NEUROLOGY

## 2021-04-17 PROCEDURE — 200200 PR NO CHARGE: Performed by: HOSPITALIST

## 2021-04-17 PROCEDURE — 63700000 HC SELF-ADMINISTRABLE DRUG: Performed by: PHYSICIAN ASSISTANT

## 2021-04-17 RX ORDER — POLYETHYLENE GLYCOL 3350 17 G/17G
17 POWDER, FOR SOLUTION ORAL DAILY PRN
Status: DISCONTINUED | OUTPATIENT
Start: 2021-04-17 | End: 2021-04-17 | Stop reason: HOSPADM

## 2021-04-17 RX ORDER — DEXTROSE MONOHYDRATE 50 MG/ML
INJECTION, SOLUTION INTRAVENOUS CONTINUOUS
Status: DISCONTINUED | OUTPATIENT
Start: 2021-04-17 | End: 2021-04-17 | Stop reason: HOSPADM

## 2021-04-17 RX ADMIN — DEXTROSE MONOHYDRATE: 50 INJECTION, SOLUTION INTRAVENOUS at 11:00

## 2021-04-17 RX ADMIN — IOHEXOL 100 ML: 350 INJECTION, SOLUTION INTRAVENOUS at 14:42

## 2021-04-17 RX ADMIN — CHOLECALCIFEROL (VITAMIN D3) 10 MCG (400 UNIT) TABLET 400 UNITS: at 08:34

## 2021-04-17 RX ADMIN — MULTIPLE VITAMINS W/ MINERALS TAB 1 TABLET: TAB at 08:34

## 2021-04-17 ASSESSMENT — COGNITIVE AND FUNCTIONAL STATUS - GENERAL
HELP NEEDED FOR BATHING: 4 - NONE
WALKING IN HOSPITAL ROOM: 4 - NONE
CLIMB 3 TO 5 STEPS WITH RAILING: 4 - NONE
AFFECT: WFL;ANXIOUS
AFFECT: WFL
TOILETING: 4 - NONE
STANDING UP FROM CHAIR USING ARMS: 4 - NONE
DRESSING REGULAR LOWER BODY CLOTHING: 4 - NONE
MOVING TO AND FROM BED TO CHAIR: 4 - NONE
HELP NEEDED FOR PERSONAL GROOMING: 4 - NONE
EATING MEALS: 4 - NONE
DRESSING REGULAR UPPER BODY CLOTHING: 4 - NONE

## 2021-04-17 ASSESSMENT — ENCOUNTER SYMPTOMS
PSYCHIATRIC NEGATIVE: 1
MUSCULOSKELETAL NEGATIVE: 1
FATIGUE: 1
CARDIOVASCULAR NEGATIVE: 1
EYES NEGATIVE: 1
RESPIRATORY NEGATIVE: 1
HEMATOLOGIC/LYMPHATIC NEGATIVE: 1
GASTROINTESTINAL NEGATIVE: 1
ACTIVITY CHANGE: 1
APPETITE CHANGE: 1

## 2021-04-17 ASSESSMENT — PATIENT HEALTH QUESTIONNAIRE - PHQ9: SUM OF ALL RESPONSES TO PHQ9 QUESTIONS 1 & 2: 0

## 2021-04-17 NOTE — DISCHARGE INSTRUCTIONS
If any recurrent symptoms seek medical advice    Discuss with your neurology regarding treatment for migraines    BMP in 3-4 days
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
## 2021-04-17 NOTE — ASSESSMENT & PLAN NOTE
Diagnosed in 2017. Follows with Dr. Moncada (neurology) and Dr. Maggie Scales (neurosurg) routinely.

## 2021-04-17 NOTE — PROGRESS NOTES
Patient: Nohemy Machuca  Location: [facility] 3A 3032  MRN: [id number]  Today's date: 4/17/2021    Pt seated upright in recliner, fall bundle intact with alarm set. Call bell/phone/personal items in reach. All immediate patient needs met. RN updated. DC acute PT needs    Nohemy is a 65 y.o. female admitted on 4/16/2021 with TIA (transient ischemic attack) [G45.9]  Expressive aphasia [R47.01]. Principal problem is TIA (transient ischemic attack).    Past Medical History  Nohemy has a past medical history of Cerebral cavernoma, History of tinnitus, and Osteoporosis.    History of Present Illness   65F hx of cerebral cavernoma and tinnitus presenting for acute onset of blurred vision and expressive aphasia that started at 5:30PM. Patient was passenger in car with  driving when she said she suddenly was seeing 4 or 5 overlayed images in her vision. She then began speaking with stuttering speech that was incomprehensible.Pt with c/o tinnitus which has been times debilitating, has seen ENT and neurosurgeon Dr. Scales at [place] and hold her she had cerebral cavernoma which is not surgically amenable.  She has followed up with neurologist at [facility] Dr. Pozo who had prescribed her noise canceling headphones.  Moreover she felt that when they did an MRI of her brain this worsened her tinnitus in the past. HCT (-) acute process. Pending MRI/MRA and Neuro consult 4/17 0740AM      PT Vitals    Date/Time Pulse HR Source SpO2 Pt Activity O2 Therapy BP BP Location BP Method Pt Position [facility]   04/17/21 1145 60 Monitor 95 % At rest None (Room air) 106/54 Right upper arm Automatic Sitting LOUISE   04/17/21 1155 64 Monitor 95 % At rest None (Room air) 102/58 Right upper arm Automatic Sitting LOUISE      PT Pain    Date/Time Pain Type Pref Pain Scale [facility]   04/17/21 1145 Pain Assessment number (Numeric Rating Pain Scale) LOUISE          Prior Living Environment      Most Recent Value   Current Living Arrangements   home/apartment/condo   Living Environment Comment  Pt lives with spouse and 24 year old son in split level house with 0STE, + [place]/den on bottom level, 6 steps with BHR to bed/bath on upper level, stall shower with grab bars          Prior Level of Function      Most Recent Value   Dominant Hand  right   Ambulation  independent   Transferring  independent   Toileting  independent   Bathing  assistive equipment   Dressing  -- [grab bars]   Eating  independent   Prior Level of Function Comment  Independent all ambulation no AD, independent self-care, + , retired special eduation techer. Hx of multiple MD appts adressing hearing difficulty   Assistive Device/Animal Currently Used at Home  grab bar          PT Evaluation and Treatment - 04/17/21 1144        PT Time Calculation    Start Time  1144     Stop Time  1201     Time Calculation (min)  17 min        Session Details    Document Type  initial evaluation     Mode of Treatment  physical therapy        General Information    Patient Profile Reviewed  yes     Onset of Illness/Injury or Date of Surgery  04/16/21     Referring Physician  Michelle Solis PA C     Patient/Family/Caregiver Comments/Observations  RN cleared for session     General Observations of Patient  Ambulating within room, performing frooming in bathroom     Existing Precautions/Restrictions  fall     Limitations/Impairments  hearing;other (see comments)    hypersensitivity to hearing       Cognition/Psychosocial    Affect/Mental Status (Cognition)  WFL;anxious     Orientation Status (Cognition)  oriented x 4     Follows Commands (Cognition)  WFL     Cognitive Function  WFL     Cognitive Interventions/Strategies  occupation/activity based interventions        Sensory    Hearing Status  hearing impairment, bilaterally    hypersensitive, wears noise-cancelling head phones       Vision Assessment/Intervention    Visual Impairment/Limitations  WFL    contacts     Impact of Vision Impairment  on Function  R lag per testing, baseline        Sensory Assessment (Somatosensory)    Sensory Assessment (Somatosensory)  sensation intact;bilateral LE     Left LE Sensory Assessment  general sensation;intact     Right LE Sensory Assessment  general sensation;intact     Bilateral LE Sensory Assessment  general sensation;intact     Sensory Subjective Reports  --    denies numbness/tingling BLE       Range of Motion (ROM)    Range of Motion  ROM is WFL    grossly BLE       Strength (Manual Muscle Testing)    Strength (Manual Muscle Testing)  strength is WFL;bilateral lower extremities    grossly 4-/5 BLE       Bed Mobility    Comment (Bed Mobility)  rec'd OOB        Transfers    Transfers  --        Sit to Stand Transfer    [place], Sit to Stand Transfer  independent     Assistive Device  none        Stand to Sit Transfer    [place], Stand to Sit Transfer  independent     Assistive Device  none        Gait Training    [place], Gait  independent     Assistive Device  none     Distance in Feet  300 feet     Pattern (Gait)  step-through     Comment (Gait/Stairs)  Pt ambulates safely, no arm swing with arms often crossed. No overt lOB good steadiness        Stairs Training    [place], Stairs  modified independence     Assistive Device  railing     Handrail Location (Stairs)  right side (ascending);left side (descending)     Number of Stairs  12     Stair Height  8 inches     Ascending Stairs Technique  step-over-step     Descending Stairs Technique  step-over-step     Comment  Good stability with stairs, good ability to pace        Balance    Balance Assessment  sitting static balance;sitting dynamic balance;sit to stand dynamic balance;standing static balance;standing dynamic balance     Static Sitting Balance  WFL     Dynamic Sitting Balance  WFL     Sit to Stand Dynamic Balance  WFL     Static Standing Balance  WFL     Dynamic Standing Balance  WFL     Comment, Balance  good balance no AD         Motor Skills    Motor Skills  functional endurance     Functional Endurance  good        Coping    Observed Emotional State  anxious;cooperative     Trust Relationship/Rapport  care explained;empathic listening provided;emotional support provided;questions answered;questions encouraged;reassurance provided;thoughts/feelings acknowledged        AM-PAC (TM) - Mobility (Current Function)    Turning from your back to your side while in a flat bed without using bedrails?  4 - None     Moving from lying on your back to sitting on the side of a flat bed without using bedrails?  4 - None     Moving to and from a bed to a chair?  4 - None     Standing up from a chair using your arms?  4 - None     To walk in a hospital room?  4 - None     Climbing 3-5 steps with a railing?  4 - None     AM-PAC (TM) Mobility Score  24        Therapy Assessment/Plan (PT)    Therapy Frequency (PT)  evaluation only     Criteria for Skilled Interventions Met (PT)  yes     Problem List (PT)  hearing        Progress Summary (PT)    Daily Outcome Statement (PT)  PT eval complete. Pt at her baseline, no futher acute PT needs. Independent no AD transfers/ambulation/stairs. Educated on stroke risk factors, signs and symptoms as well as preventative eduation and when to consult therapy/MD. DC acute PT. Rec home with family     Symptoms Noted During/After Treatment  none        Therapy Plan Review/Discharge Plan (PT)    PT Recommended Discharge Disposition  home     Anticipated Equipment Needs at Discharge (PT)  none                       Education provided this session. See the Patient Education summary report for full details.

## 2021-04-17 NOTE — HOSPITAL COURSE
Nohemy is a 65 y.o. female admitted on 4/16/2021 with TIA (transient ischemic attack) [G45.9]  Expressive aphasia [R47.01]. Principal problem is TIA (transient ischemic attack).    Past Medical History  Nohemy has a past medical history of Cerebral cavernoma, History of tinnitus, and Osteoporosis.    History of Present Illness   65F hx of cerebral cavernoma and tinnitus presenting for acute onset of blurred vision and expressive aphasia that started at 5:30PM. Patient was passenger in car with  driving when she said she suddenly was seeing 4 or 5 overlayed images in her vision. She then began speaking with stuttering speech that was incomprehensible.Pt with c/o tinnitus which has been times debilitating, has seen ENT and neurosurgeon Dr. Scales at [place] and hold her she had cerebral cavernoma which is not surgically amenable.  She has followed up with neurologist at [facility] Dr. Pozo who had prescribed her noise canceling headphones.  Moreover she felt that when they did an MRI of her brain this worsened her tinnitus in the past. HCT (-) acute process. Pending MRI/MRA and Neuro consult 4/17 0740AM

## 2021-04-17 NOTE — ASSESSMENT & PLAN NOTE
Na 128 on arrival and improved to 138 this morning.  Will give D5 80cc an hour.  Discussed with nephrology .    Serum osmolality is 289.  Urine sodium was 20  Strict I & O s  Monitor BMP q6 hours   Nephrology antonette

## 2021-04-17 NOTE — ASSESSMENT & PLAN NOTE
- Presenting with acute onset of blurry vision and expressive aphasia.   - NIHSS 5 on arrival. Made a stroke alert. CT head non contrast without bleed - TPA was going to be administered however upon arrival from CT scan pt more conversant, NIHSS 0.  - Dr Cornejo was consulted by ED staff - not a tpa candidate as NIHSS was now 0. Recommended admission for TIA work up.   - Received 324mg aspirin in the ED.     Plan:  - Monitor on telemetry  - Neurochecks/NIH  Patient cannot get MRI brain/MRA secondary to her tinnitus.  CT head with and without contrast ordered by neurology  - US bilateral carotids   - Obtain ECHO  Troponin negative x3  Lipid panel noted LDL was 113  hgba1c pending  - Neurology consult -discussed with Dr. Cornejo.  Possibly migraine

## 2021-04-17 NOTE — PLAN OF CARE
Problem: Adult Inpatient Plan of Care  Goal: Plan of Care Review  Outcome: Met  Flowsheets (Taken 4/17/2021 6717)  Plan of Care Reviewed With: patient  Outcome Summary: Pt seen for OT eval. Pt completed fxl transfers/mobility and adls at independent level with no AE and demo'd good balance and safety awareness. Pt has mild R eye lag with visual tracking to R but does not impact fxl ability. Pt appears to be functioning close to baseline and does not require additional skilled acute care OT needs. Will d/c OT. Rec home with assist from family upon d/c.  Goal: Patient-Specific Goal (Individualized)  Outcome: Met  Flowsheets (Taken 4/17/2021 5778)  Patient-Specific Goals (Include Timeframe): to go home     Problem: Self-Care Deficit  Goal: Improved Ability to Complete Activities of Daily Living  Outcome: Met

## 2021-04-17 NOTE — PROGRESS NOTES
Hospital Medicine Service  Daily Progress Note       SUBJECTIVE     Patient reports no headache today. no visual problems.     OBJECTIVE        Vital Signs  Temp:  [36.5 °C (97.7 °F)-36.8 °C (98.2 °F)] 36.8 °C (98.2 °F)  Heart Rate:  [50-72] 54  Resp:  [16-20] 18  BP: ()/(49-70) 93/51     SpO2 Readings from Last 3 Encounters:   04/17/21 100%   04/08/21 98%   02/08/21 97%       No intake/output data recorded.    PHYSICAL EXAMINATION        GEN:; not in acute distress  HEENT: normocephalic; atraumatic     CARDIO: regular rate and rhythm;   RESP: decreased at bases  ABD: soft,  non-tender, normal bowel sounds  EXT: no  edema  NEURO: alert and oriented x 3; nonfocal       LABS / IMAGING / TELE        Labs  Lab Results   Component Value Date    WBC 4.90 04/17/2021    HGB 13.1 04/17/2021    HCT 38.3 04/17/2021    MCV 94.8 04/17/2021     04/17/2021     Lab Results   Component Value Date    GLUCOSE 91 04/17/2021    CALCIUM 9.3 04/17/2021     04/17/2021    K 4.4 04/17/2021    CO2 28 04/17/2021     04/17/2021    BUN 12 04/17/2021    CREATININE 0.5 (L) 04/17/2021     No results found for: INR, PROTIME    Imaging  X-RAY CHEST 1 VIEW    Result Date: 4/16/2021  IMPRESSION: No acute pulmonary process.     CT HEAD STROKE ALERT    Result Date: 4/16/2021  IMPRESSION: No acute intracranial process.  Patient's known small left thalamic cavernoma again seen. Finding:    Other   Acuity: Critical  Status:  CLOSED Critical read back was performed and results were read back by Mehdi Cortes resident physician, on 4/16/2021 6:43 PM           ASSESSMENT AND PLAN      * TIA (transient ischemic attack)  Assessment & Plan  - Presenting with acute onset of blurry vision and expressive aphasia.   - NIHSS 5 on arrival. Made a stroke alert. CT head non contrast without bleed - TPA was going to be administered however upon arrival from CT scan pt more conversant, NIHSS 0.  - Dr Cornejo was consulted by ED staff - not a tpa  candidate as NIHSS was now 0. Recommended admission for TIA work up.   - Received 324mg aspirin in the ED.     Plan:  - Monitor on telemetry  - Neurochecks/NIH  Patient cannot get MRI brain/MRA secondary to her tinnitus.  CT head with and without contrast ordered by neurology  - US bilateral carotids   - Obtain ECHO  Troponin negative x3  Lipid panel noted LDL was 113  - Neurology consult -discussed with Dr. Cornejo.  Possibly migraine    Hyponatremia  Assessment & Plan  Na 128 on arrival and improved to 138 this morning.  Will give D5 80cc an hour.  Discussed with nephrology .    Serum osmolality is 289.  Urine sodium was 20  Strict I & O s  Monitor BMP q6 hours   Nephrology eval    Cerebral cavernoma  Assessment & Plan  Diagnosed in 2017. Follows with Dr. Moncada (neurology) and Dr. Maggie Scales (neurosurg) routinely.     Osteoporosis without current pathological fracture  Assessment & Plan  Continue with Vitamin D3 daily     SNHL (sensorineural hearing loss)  Assessment & Plan  Reports history of severe tinnitus s/p long dental procedure in 10/2020.   Has been seen by ENT at [place] - diagnosed with sensorineural hearing loss.  Reports she is unable to undergo any prolonged imaging studies due to severe tinnitus without being sedated.        CT head/neck with and without contrast  VTE Assessment: scd  Code Status: Full Code  Estimated discharge date: 4/17/2021     Melissa Hill MD  4/17/2021  11:06 AM

## 2021-04-17 NOTE — ASSESSMENT & PLAN NOTE
Reports history of severe tinnitus s/p long dental procedure in 10/2020.   Has been seen by ENT at [place] - diagnosed with sensorineural hearing loss.  Reports she is unable to undergo any prolonged imaging studies due to severe tinnitus without being sedated.

## 2021-04-17 NOTE — CONSULTS
Clinical Nutrition Note    Clinical Comments:    Pt seen for low BMI of 17.3.   Visited pt at bedside, reports  lb, has been this weight for years. Good appetite, soft foods at home due to dental work. She requested a soft diet here but now realizes it is restricting foods that she is sure she can tolerate, requests regular diet. Confirmed with RN that pt is able to be on regular diet and that she had requested soft diet herself. Diet changed. Pt looking forward to turkey burger for lunch. Denies any nutrition needs, appears to not be at risk     Discussed with: patient and RN                            Verbalizes understanding      Date: 04/17/21  Signature: Michelle Sinha RD

## 2021-04-17 NOTE — PROGRESS NOTES
Patient: Nohemy Machuca  Location: [facility] 3A 3032  MRN: [id number]  Today's date: 4/17/2021   Pt left bedside chair with personal items in place and call bell within reach, posey alarm intact. Pt reports no further questions for OT at this time and all needs are met. RN notified    Nohemy is a 65 y.o. female admitted on 4/16/2021 with TIA (transient ischemic attack) [G45.9]  Expressive aphasia [R47.01]. Principal problem is TIA (transient ischemic attack).    Past Medical History  Nohemy has a past medical history of Cerebral cavernoma, History of tinnitus, and Osteoporosis.    History of Present Illness   65F hx of cerebral cavernoma and tinnitus presenting for acute onset of blurred vision and expressive aphasia that started at 5:30PM. Patient was passenger in car with  driving when she said she suddenly was seeing 4 or 5 overlayed images in her vision. She then began speaking with stuttering speech that was incomprehensible.Pt with c/o tinnitus which has been times debilitating, has seen ENT and neurosurgeon Dr. Scales at [place] and hold her she had cerebral cavernoma which is not surgically amenable.  She has followed up with neurologist at [facility] Dr. Pozo who had prescribed her noise canceling headphones.  Moreover she felt that when they did an MRI of her brain this worsened her tinnitus in the past. HCT (-) acute process. Pending MRI/MRA and Neuro consult 4/17 0740AM      OT Vitals    Date/Time Pulse HR Source SpO2 Pt Activity O2 Therapy BP BP Location BP Method Pt Position [facility]   04/17/21 1145 60 Monitor 95 % At rest None (Room air) 106/54 Right upper arm Automatic Sitting LOUISE   04/17/21 1155 64 Monitor 95 % At rest None (Room air) 102/58 Right upper arm Automatic Sitting LOUISE      OT Pain    Date/Time Pain Type Pref Pain Scale [facility]   04/17/21 1145 Pain Assessment number (Numeric Rating Pain Scale) LOUISE          Prior Living Environment      Most Recent Value   Current Living Arrangements   home/apartment/condo   Living Environment Comment  Pt lives with spouse and 24 year old son in split level house with 0STE, + [place]/den on bottom level, 6 steps with BHR to bed/bath on upper level, stall shower with grab bars          Prior Level of Function      Most Recent Value   Dominant Hand  right   Ambulation  independent   Transferring  independent   Toileting  independent   Bathing  assistive equipment [grab bars]   Dressing  independent   Eating  independent   Prior Level of Function Comment  PTA pt reports indep with adls/iadls, no use of AE for fxl transfers/mobility, + drive, retired   Assistive Device/Animal Currently Used at Home  grab bar          Occupational Profile      Most Recent Value   Reason for Services/Referral  ADL / amb dysfunction post TIA   Successful Occupations  retired    Occupational History/Life Experiences  enjoys walking   Environmental Supports and Barriers  supportive family   Patient Goals  to go home          OT Evaluation and Treatment - 04/17/21 1142        OT Time Calculation    Start Time  1142     Stop Time  1201     Time Calculation (min)  19 min        Session Details    Document Type  initial evaluation     Mode of Treatment  occupational therapy        General Information    Patient Profile Reviewed  yes     Onset of Illness/Injury or Date of Surgery  04/16/21     Referring Physician  Sergio     General Observations of Patient  pt rec'd standing sink side for grooming task in bathroom, agreeable to participate in therapy     Existing Precautions/Restrictions  fall        Cognition/Psychosocial    Affect/Mental Status (Cognition)  WFL     Orientation Status (Cognition)  oriented x 4     Follows Commands (Cognition)  WFL     Cognitive Function  WFL     Comment, Cognition  Pt is pleasant and cooperative, mild anxiety with medical status/monitoring for future CVA possibilities but follows all commands appropriately and was receptive to education  to monitor for possible signs of CVA        Hearing Assessment    Hearing Status  other (see comments)    sees EMT for auditory dysfunction       Vision Assessment/Intervention    Visual Impairment/Limitations  WFL    contacts    Visual Motor Impairment  visual tracking, right    R lag with tracking to R       Sensory Assessment (Somatosensory)    Sensory Assessment (Somatosensory)  UE sensation intact        Range of Motion (ROM)    Range of Motion  right upper extremity ROM deficit     Right Upper Extremity (ROM)  right UE ROM is WFL except;shoulder    hx RTC    Shoulder, Right (ROM)  5* AROM, pain at 10* with PROM        Strength (Manual Muscle Testing)    Strength (Manual Muscle Testing)  right upper extremity strength deficit    5/5 grossly, RUE shoulder at 2-/5 2/2 RTC       Bed Mobility    Comment (Bed Mobility)  pt rec'd OOB and returned to bedside chair        Transfers    Transfers  toilet transfer        Sit to Stand Transfer    [place], Sit to Stand Transfer  independent     Assistive Device  none        Stand to Sit Transfer    [place], Stand to Sit Transfer  independent     Assistive Device  none        Toilet Transfer    [place], Toilet Transfer  independent     Assistive Device  none     Comment  from standard height toilet with no use of grab bars        Safety Issues, Functional Mobility    Comment, Safety Issues/Impairments (Mobility)  Pt demo'd good endurance/activity tolerance and safety awareness at indep level with no use of AE        Balance    Balance Assessment  sitting static balance;sitting dynamic balance;sit to stand dynamic balance;standing static balance;standing dynamic balance     Static Sitting Balance  WFL     Dynamic Sitting Balance  WFL     Sit to Stand Dynamic Balance  WFL     Static Standing Balance  WFL     Dynamic Standing Balance  WFL     Comment, Balance  good balance in stance with no AE and no LOB noted        Motor Skills    Motor Skills  coordination      Coordination  WFL;bilateral;upper extremity        Lower Body Dressing    Tasks  doff;don;socks     [place]  independent     Position  supported sitting     Comment  Pt completed task with crossover technique and was able to complete task with limited RUE shoulder ROM        Grooming    Self-Performance  brushes/franz hair     [place]  independent     Position  sink side     Adaptive Equipment  none     Comment  Pt completed task with use of LUE 2/2 dec shoulder ROM in RUE        BADL Safety/Performance    Impairments, BADL Safety/Performance  endurance/activity tolerance     Skilled BADL Treatment/Intervention  BADL process/adaptation training     Progress in BADL Status  independence level        AM-PAC (TM) - ADL (Current Function)    Putting on and taking off regular lower body clothing?  4 - None     Bathing?  4 - None     Toileting?  4 - None     Putting on/taking off regular upper body clothing?  4 - None     How much help for taking care of personal grooming?  4 - None     Eating meals?  4 - None     AM-PAC (TM) ADL Score  24        Therapy Assessment/Plan (OT)    Rehab Potential (OT)  good, to achieve stated therapy goals     Therapy Frequency (OT)  evaluation only        Progress Summary (OT)    Daily Outcome Statement (OT)  Pt seen for OT eval. Pt completed fxl transfers/mobility and adls at independent level with no AE and demo'd good balance and safety awareness. Pt has mild R eye lag with visual tracking to R but does not impact fxl ability. Pt appears to be functioning close to baseline and does not require additional skilled acute care OT needs. Will d/c OT. Rec home with assist from family upon d/c.     Symptoms Noted During/After Treatment  none        Therapy Plan Review/Discharge Plan (OT)    OT Recommended Discharge Disposition  home with assist     Anticipated Equipment Needs At Discharge (OT)  none                  Education provided this session. See the Patient Education summary  report for full details.

## 2021-04-17 NOTE — PLAN OF CARE
Problem: Adult Inpatient Plan of Care  Goal: Plan of Care Review  Outcome: Progressing  Flowsheets (Taken 4/17/2021 1557)  Progress: no change  Plan of Care Reviewed With: patient  Outcome Summary: PT eval complete. DC acute PT, pt at baseline     Problem: Adult Inpatient Plan of Care  Goal: Patient-Specific Goal (Individualized)  Outcome: Progressing  Flowsheets (Taken 4/17/2021 1557)  Patient-Specific Goals (Include Timeframe): to go home

## 2021-04-17 NOTE — ED ATTESTATION NOTE
Critical Care  Performed by: Christian Mcarthur MD  Authorized by: Christian Mcarthur MD     Critical care provider statement:     Critical care time (minutes):  30    Critical care start time:  4/16/2021 6:10 PM    Critical care end time:  4/16/2021 6:40 PM    Critical care time was exclusive of:  Separately billable procedures and treating other patients    Critical care was necessary to treat or prevent imminent or life-threatening deterioration of the following conditions:  CNS failure or compromise    Critical care was time spent personally by me on the following activities:  Development of treatment plan with patient or surrogate, discussions with consultants, evaluation of patient's response to treatment, examination of patient, obtaining history from patient or surrogate, ordering and performing treatments and interventions, ordering and review of laboratory studies, ordering and review of radiographic studies, pulse oximetry, re-evaluation of patient's condition and review of old charts  Comments:      Given patient's debilitating CNS findings of expressive aphasia/word finding difficulty stroke alert was called and patient was taken to stat CT scan.  There was no bleed that was found.  When she came back from the CT scan her deficits were almost completely gone.  She also had developed a headache.  Bring on the possibility this could be a TIA or a complicated migraine.  He spoke to Dr. Cornejo who is on-call for neurology.  He agreed that given the improvement that is quite substantial that TPA was not indicated.  Patient received 325 mg aspirin and will be hospitalized.  She remained neurologically stable the rest of her ED stay.  Blood pressure is under good control.      Physical Exam  Review of Systems    4/17/20212:48 PM  I have personally seen and examined the patient.  I reviewed and agree with the PA/NP/Resident's assessment and plan of care.    My examination, assessment, and plan of care of Nohemy UP  Brigette is as follows:    The patient presents with expressive aphasia and word finding difficulty that started about 45 minutes ago.  Patient was passenger in a car going by her  when she saw a kaleidoscope of lights throughout her entire visual fields pulsating, like a strobe light so that she could not see anything else.  She told her  what she was experiencing and then shortly after that she was not able to find words to speak coherently making her quite agitated and anxious.  He drove her to the hospital here.  Only past medical history is that of a cavernoma and fairly debilitating tinnitus worried she has earplugs.    Exam: She is awake and alert and will make eye contact and follow instructions however she has a very serious deficit in finding words to say.  She will begin a sentence and have to stop almost like stuttering time to find the word to say.  When I show her a pen she appears to know what to do with it as she takes it in her hand and clicks it and ask like she is writing with it but cannot name it.  The same with the watch.  She has 5 out of 5 strength and touch sensation in all extremities.  Cranial nerves all appear to be intact.  Lungs are clear.  Heart is sinus without a murmur.  Abdomen soft nontender.  Blood pressure is in the normal range as is the pulse ox and pulse rate.    Impression/Plan: A stroke alert was called and she was taken to CAT scan.  We are planning to give TPA.  A call was placed to the on-call neurologist Dr. Cornejo.  Looking at the CAT scan we did not see any hemorrhage so a call was placed to pharmacy to get the TPA ready.  On return from CAT scan as I was going to get consent for TPA patient was able to find words to say.  I showed her a pen and the watch and she was able to say his name is without any problems and she noted the difference as well.  She is able to tell me that she was at [facility] and the year was 2021.  NIH score at this point is now  0.  Earlier we had gotten a score of 5.  I spoke to Dr. Cornejo and related significant improvement.  He agreed that TPA was not necessary.  We will give an aspirin 325 mg orally and treat this as a TIA.  Labs show that the sodium was low at 128.  This may be contributing to some of the findings.  A kaleidoscope-like lights followed by neurological deficit and now a headache (which she has developed now about 20 minutes into her hospital stay) makes a migrainous phenomenon another possibility.  Agree with resident management and hospitalization.    Vital Signs Review: Vital signs have been reviewed. The oxygen saturation is  SpO2: 100 % which is normal.    This document was created using dragon dictation software.  There might be some typographical errors due to this technology.     Christian Mcarthur MD  04/17/21 2650

## 2021-04-17 NOTE — CONSULTS
NEUROLOGICAL CONSULTATION      PATIENT NAME:  Nohemy Machuca      YOB: 1955   AGE:  65 y.o.      GENDER: female  MRN:  [id number]          PATIENT #: [id number]    Reason for Consultation:     Requesting service:     HISTORY   She presented to [facility] yesterday as a stroke alert.    She is admittedly been under a lot of stress recently she has been dealing with refractory tinnitus in her left ear, has a history of facial pain, cavernoma, and was riding as a passenger in a car yesterday when she began seeing kaleidoscope visual distortion in both eyes.  She was stuttering her speech, that what was unable to speak at all.  Her  took her to [facility] emergency department where head CT was unremarkable however her symptoms completely resolved and her NIH stroke score was 0 thus she was not a candidate for IV TPA.  She then began to have a severe headache which lasted into the night and has since resolved.  She denied any focal weakness, or other sensory changes.  She was given aspirin in the emergency department, and has declined to have a follow-up brain MRI because she states this could worsen her tinnitus.  She remembers walking into the emergency department but has no recollection of many other events while in the ER.  There is no report that she was having difficulty retaining new information, or asking repetitive questions.  She was noted to have hyponatremia which has since resolved.    She does not have any stroke risk factors.  She is currently followed by Dr. Damon, previously followed by Dr. Keenan, and Dr. Santizo with neurology at [facility] and [facility] respectively.    PAST MEDICAL HISTORY     Past Medical History:   Diagnosis Date   • Cerebral cavernoma    • History of tinnitus    • Osteoporosis        PAST SURGICAL HISTORY     Past Surgical History:   Procedure Laterality Date   • BUNIONECTOMY Bilateral    • TONSILLECTOMY          FAMILY HISTORY      Family History   Problem Relation Age of Onset   • Diverticulitis Biological Mother    • Glaucoma Biological Father        SOCIAL HISTORY     Social History     Tobacco Use   • Smoking status: Never Smoker   • Smokeless tobacco: Never Used   Substance Use Topics   • Alcohol use: Yes     Comment: events       ALLERGIES     Allergies   Allergen Reactions   • No Known Allergies        MEDICATIONS   Home Medication:  •  cholecalciferol (vitamin D3), Take 400 Units by mouth daily.  •  multivitamin, Take by mouth daily.    MEDICATIONS:  Infusions:    • dextrose 5 %   80 mL/hr at 21 1100          Scheduled:   • cholecalciferol (vitamin D3)  400 Units oral Daily   • melatonin  5 mg oral Nightly   • multivitamin  1 tablet oral Daily       REVIEW OF SYSTEMS   13 point review of systems completed. Negative except for above mentioned history.    PHYSICAL EXAMINATION   Temperature: Temp (24hrs), Av.7 °C (98 °F), Min:36.5 °C (97.7 °F), Max:36.8 °C (98.2 °F)     BP Max:  Systolic (24hrs), Av , Min:91 , Max:125      BP Amaya:  Diastolic (24hrs), Av, Min:49, Max:70    Recent:      Patient Vitals for the past 4 hrs:   BP Temp Temp src Pulse Resp SpO2   21 1130 97/60 36.8 °C (98.2 °F) Oral 76 18 95 %       General: No acute distress, appears stated age. Anxious   Neck: Supple, no carotid bruits  Cardiovascular: Rate and rhythm are regular  Lungs: Normal breath sounds   Abdomen: Soft, non-tender   Neurologic Exam:   Mental status: Alert and oriented x 3. Speech is clear and fluent. Memory is intact.   Cranial nerves: Pupils are 3 mm bilaterally and equally reactive to light.  Extraocular movements are intact without nystagmus.  Visual fields by confrontation are full.  Facial strength is symmetric.  Facial sensation is intact to light touch. Hearing is decreased on the left.  Tongue is midline on protrusion with symmetric palate elevation.  Strength: 5/5 throughout.  No pronator drift.  Tone is  normal.  Sensation: Intact to light touch.   Reflexes: 2+ and symmetric with downgoing toes  Coordination: Finger to nose intact.  Gait: Normal    Physical Exam  Neurologic Exam    Diagnostic Data:     Labs reviewed-  Lab Results   Component Value Date    WBC 4.90 04/17/2021    HGB 13.1 04/17/2021    HCT 38.3 04/17/2021     04/17/2021    CHOL 191 04/17/2021    TRIG 37 04/17/2021    HDL 71 04/17/2021    ALT 30 04/16/2021    AST 38 04/16/2021     04/17/2021    K 4.4 04/17/2021     04/17/2021    CREATININE 0.5 (L) 04/17/2021    BUN 12 04/17/2021    CO2 28 04/17/2021    TSH 4.56 04/16/2021     Imaging reviewed  X-RAY CHEST 1 VIEW    Result Date: 4/16/2021  IMPRESSION: No acute pulmonary process.     CT HEAD STROKE ALERT    Result Date: 4/16/2021  IMPRESSION: No acute intracranial process.  Patient's known small left thalamic cavernoma again seen. Finding:    Other   Acuity: Critical  Status:  CLOSED Critical read back was performed and results were read back by Mehdi Cortes resident physician, on 4/16/2021 6:43 PM       Labs:  No results found for: PHENYTOIN, PHENOBARB, VALPROATE, CBMZ  Lab Results   Component Value Date    ONFYOVWD48 657 01/04/2021     Lab Results   Component Value Date    FOLATE >20.0 01/04/2021     Lab Results   Component Value Date    TSH 4.56 04/16/2021         IMPRESSION/PLAN     Cerebral cavernoma  Assessment & Plan  She will continue to follow-up with neurosurgery as an outpatient.    * TIA (transient ischemic attack)  Assessment & Plan  She refuses to have Brain MRI, willing to have Head CT and CTA.  She has been under a lot of stress and concerned about the J&J Vaccine that she received 3-4 weeks ago.  Suspect this was a new onset of migraine with visual aura.  Need to exclude vascular etiologies.  The positive visual symptoms were not consistent with TIA and given her lack of vascular risk factors we could discontinue aspirin if follow-up imaging is unremarkable.  Seizure  disorder would also seem unlikely and an outpatient EEG could be obtained if recurrent events.        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

## 2021-04-17 NOTE — CONSULTS
NEPHROLOGY CONSULTATION    Reason for consult: Hyponatremia.    Consulting Physician: Dr. Hill.    HPI: Thank you for this consultation on Nohemy Machuca who is a 65 y.o. female with past medical history of Cerebral cavernoma, tinnitus, osteoporosis is here with complaints of acute onset of blurry vision and expressive aphasia. She feels better and back to normal now.   Her Sodium with initial labs was 128 at 6:12 PM last evening and she was on IV fluids with NS and her Sodium corrected to 134 at 12 midnight and 138 today morning at 6 AM. She was drinking plenty of water as that helps her tinnitus. Her previous Sodium have been on the lower side in low 130's.  She is not on SSRI's or thiazides.   Review of Systems   Constitutional: Positive for activity change, appetite change and fatigue.   HENT: Negative.    Eyes: Negative.    Respiratory: Negative.    Cardiovascular: Negative.    Gastrointestinal: Negative.    Genitourinary: Negative.    Musculoskeletal: Negative.    Skin: Negative.    Neurological:        Tinnitus   Hematological: Negative.    Psychiatric/Behavioral: Negative.        Past Medical History:   Diagnosis Date   • Cerebral cavernoma    • History of tinnitus    • Osteoporosis        Past Surgical History:   Procedure Laterality Date   • BUNIONECTOMY Bilateral    • TONSILLECTOMY         Social History     Tobacco Use   • Smoking status: Never Smoker   • Smokeless tobacco: Never Used   Substance Use Topics   • Alcohol use: Yes     Comment: events   • Drug use: Never       Family History   Problem Relation Age of Onset   • Diverticulitis Biological Mother    • Glaucoma Biological Father        Allergies   Allergen Reactions   • No Known Allergies        Home Medication List:  •  cholecalciferol (vitamin D3), Take 400 Units by mouth daily.  •  multivitamin, Take by mouth daily.      Current Facility-Administered Medications:   •  acetaminophen (TYLENOL) tablet 650 mg, 650 mg, oral, q4h PRN,  Michelle Solis PA C, 650 mg at 04/16/21 2308  •  cholecalciferol (vitamin D3) tablet 400 Units, 400 Units, oral, Daily, Michelle Solis PA C, 400 Units at 04/17/21 0834  •  glucose chewable tablet 16-32 g of dextrose, 16-32 g of dextrose, oral, PRN **OR** dextrose 40 % oral gel 15-30 g of dextrose, 15-30 g of dextrose, oral, PRN **OR** glucagon (GLUCAGEN) injection 1 mg, 1 mg, intramuscular, PRN **OR** dextrose in water injection 12.5 g, 25 mL, intravenous, PRN, Michelle Solis PA C  •  dextrose 5 % infusion, , intravenous, Continuous, Melissa Hill MD  •  melatonin tablet 5 mg, 5 mg, oral, Nightly, Michelle Solis PA C, 5 mg at 04/16/21 2250  •  multivitamin tablet 1 tablet, 1 tablet, oral, Daily, Michelle Solis PA C, 1 tablet at 04/17/21 0834  •  ondansetron ODT (ZOFRAN-ODT) disintegrating tablet 4 mg, 4 mg, oral, q8h PRN **OR** ondansetron (ZOFRAN) injection 4 mg, 4 mg, intravenous, q8h PRN, Michelle Solis PA C    PHYSICAL EXAM:  Vitals:    04/17/21 0333 04/17/21 0534 04/17/21 0700 04/17/21 0730   BP: (!) 94/49 (!) 92/59  (!) 93/51   BP Location: Left upper arm Left upper arm  Right upper arm   Patient Position: Lying Sitting  Lying   Pulse: (!) 54 (!) 53 (!) 58 (!) 54   Resp: 18 18  18   Temp: 36.8 °C (98.2 °F) 36.8 °C (98.2 °F)  36.8 °C (98.2 °F)   TempSrc: Oral Oral  Oral   SpO2: 97% 97%  100%   Weight:       Height:             Intake/Output Summary (Last 24 hours) at 4/17/2021 1052  Last data filed at 4/17/2021 0900  Gross per 24 hour   Intake 240 ml   Output --   Net 240 ml       Physical Exam  Vitals reviewed.   Constitutional:       General: She is not in acute distress.     Appearance: Normal appearance.   HENT:      Head: Normocephalic and atraumatic.      Nose: Nose normal.      Mouth/Throat:      Mouth: Mucous membranes are moist.   Eyes:      Extraocular Movements: Extraocular movements intact.      Pupils: Pupils are equal, round,  and reactive to light.   Cardiovascular:      Rate and Rhythm: Normal rate and regular rhythm.      Pulses: Normal pulses.      Heart sounds: Normal heart sounds.   Pulmonary:      Effort: Pulmonary effort is normal.      Breath sounds: Normal breath sounds. No wheezing or rales.   Abdominal:      General: Bowel sounds are normal. There is no distension.      Palpations: Abdomen is soft.      Tenderness: There is no abdominal tenderness.   Musculoskeletal:         General: Normal range of motion.      Cervical back: Neck supple.      Right lower leg: No edema.      Left lower leg: No edema.   Skin:     General: Skin is warm.   Neurological:      General: No focal deficit present.      Mental Status: She is alert and oriented to person, place, and time.   Psychiatric:         Mood and Affect: Mood normal.         Behavior: Behavior normal.         Results from last 7 days   Lab Units 04/17/21  0613 04/16/21  1812   SODIUM mEQ/L 138 128*   POTASSIUM mEQ/L 4.4 3.8   CHLORIDE mEQ/L 104 95*   CO2 mEQ/L 28 21*   BUN mg/dL 12 19   CREATININE mg/dL 0.5* 0.6   EGFR mL/min/1.73m*2 >60.0 >60.0   GLUCOSE mg/dL 91 119*   CALCIUM mg/dL 9.3 10.0   ALBUMIN g/dL  --  4.8   WBC K/uL 4.90 8.45   HEMOGLOBIN g/dL 13.1 14.4   HEMATOCRIT % 38.3 40.3   PLATELETS K/uL 185 227       Pertinent radiology and labs reviewed    ASSESSMENT:  Principal Problem:    TIA (transient ischemic attack)  Active Problems:    SNHL (sensorineural hearing loss)    Osteoporosis without current pathological fracture    Cerebral cavernoma    Hyponatremia      PLAN:  1. Hyponatremia with low urine Na and urine osm due to poor solute intake and she was drinking a lot of water, corrected with NS. Her Sodium went up by 10 in 12 hrs, is on D5W at 80 ml/hr. Follow with BMP Q 8 hrs.  2.  Acute blurry vision and expressive aphasia, is better. Neurologist is on case.  3. Tinnitus.  4. Blood pressures, blood pressures are relatively on the lower side. Will  follow.  Discussed with Dr. Hill.  Discussed with the patient.  Enriqueta Paulino MD

## 2021-04-17 NOTE — ASSESSMENT & PLAN NOTE
She refuses to have Brain MRI, willing to have Head CT and CTA.  She has been under a lot of stress and concerned about the J&J Vaccine that she received 3-4 weeks ago.  Suspect this was a new onset of migraine with visual aura.  Need to exclude vascular etiologies.  The positive visual symptoms were not consistent with TIA and given her lack of vascular risk factors we could discontinue aspirin if follow-up imaging is unremarkable.  Seizure disorder would also seem unlikely and an outpatient EEG could be obtained if recurrent events.

## 2021-04-23 ENCOUNTER — OFFICE VISIT (OUTPATIENT)
Dept: PRIMARY CARE | Facility: CLINIC | Age: 66
End: 2021-04-23
Payer: COMMERCIAL

## 2021-04-23 VITALS
OXYGEN SATURATION: 96 % | WEIGHT: 101 LBS | SYSTOLIC BLOOD PRESSURE: 82 MMHG | HEART RATE: 67 BPM | DIASTOLIC BLOOD PRESSURE: 54 MMHG | HEIGHT: 67 IN | BODY MASS INDEX: 15.85 KG/M2 | TEMPERATURE: 97.7 F

## 2021-04-23 DIAGNOSIS — H93.13 TINNITUS OF BOTH EARS: ICD-10-CM

## 2021-04-23 DIAGNOSIS — E87.1 HYPONATREMIA: ICD-10-CM

## 2021-04-23 DIAGNOSIS — K59.00 CONSTIPATION, UNSPECIFIED CONSTIPATION TYPE: ICD-10-CM

## 2021-04-23 DIAGNOSIS — F41.9 ANXIETY: ICD-10-CM

## 2021-04-23 DIAGNOSIS — G45.9 TIA (TRANSIENT ISCHEMIC ATTACK): Primary | ICD-10-CM

## 2021-04-23 PROCEDURE — 99215 OFFICE O/P EST HI 40 MIN: CPT | Performed by: FAMILY MEDICINE

## 2021-04-23 PROCEDURE — 3008F BODY MASS INDEX DOCD: CPT | Performed by: FAMILY MEDICINE

## 2021-04-23 RX ORDER — ASPIRIN 81 MG/1
81 TABLET ORAL DAILY
COMMUNITY
End: 2022-03-17

## 2021-04-23 RX ORDER — ROSUVASTATIN CALCIUM 5 MG/1
5 TABLET, COATED ORAL DAILY
COMMUNITY
End: 2021-07-30

## 2021-04-23 RX ORDER — CLOPIDOGREL BISULFATE 75 MG/1
75 TABLET ORAL DAILY
COMMUNITY
End: 2021-07-30

## 2021-04-23 NOTE — PROGRESS NOTES
"            OFFICE VISIT NOTE     FEI PRECIADO DO        PATIENT NAME:Nohemy Machuca  PATIENT : 1955  AMANDA: 2021    CC:   Chief Complaint   Patient presents with   • hosp f/u     TIA- 21 harjeet-   saw neuro at [facility] on 21         HPI   Nohemy Machuca is a 65 y.o. female  Working as a  [employer] living with  with incidental cerebral cavernoma, trigeminal neuralgia, osteoporosis presenting to review chronic conditions and recent new TIA diagnoses, hospitalization.     She is here with her  who provides additional hx.     #TIA  She had vision, speech and gait issueswith post headache  2021   felt like she was under \"Water\" and had blurred vision  Her sx  Lasted for 1.5 hours then had residual headahce  She was in obs - andeval by neuro and did MRI MRA  Without imaging findings.   She was seen by neuro dr leahy who rec DAPT plavix and aspirin for 1 month then aspirin 81mg and crestor 5 daily  Yearly carotid US  rec to see cardiology dr spear and has this scheduled   she is anxious as she cannot comprehend the cause of her TIA   she denies headaches now, she denies blurred vision, vomit, dizziness. Chronic tinnitus           HEALTH MAINTENANCE    -- Pneumonia Immunization: not DUE  -- Influenza Immunization: DUE declines   -- Shingles Immunization:shingles  -- Colorectal cancer screening:dr oleary   q 3 years? Due 2020 (declines)   -- Breast Cancer screening: mammo 2020. Due cript given   -- Cervical cancer screening: DUE normal  q3-5 years  6/15/18   -- Bone Health screenin2017 +osteoporosis  -- Vision Screening:intact normal   -- Hearing Screening: ent hearing loss   -- 2021  covid vaccination luis mnauel     #osteoporosis  DEXA 2017 +osteoporosis  +fam hx mother and M grandmother  Taking MVI  Taking vit D3 1000  Weight bearnig exercises- lifting and walking   + fracture left hand from shoveling "     #cavernoma left thalamas, cerebral    found on MRI  For admission for trigeminal neurlagia   followup adelina borja  q2 years MRI w and WO  Thought to be 2/2 mild trauma young no overt issues now at this time. Surveillance  7/10/2020 recent MRI   21: Ct anio unchanged hemangiomany     #trigeminal neuralgia  #tinnitus, bl   Tegretol 100 daily -> no longer on this  Seen by neuro  liporace and ENT tinnitus   Ongoing looking into audiology therapy and accupuncture    #bl ETD and TMJ tensions  #bl high freq sensorineural hearing loss -- tinnitus   - seeing ent astelin nasal spray for allergic rhinitis  - dr queen ENT, flavanoid     #vitiligo- chronic, father. Not seenig derm     SOCIAL HISTORY:  Denies etoh, drug, tobacco      FUNCTIONAL HISTORY:  ADL   Feeding-I  Toileting-I  Dressing-I  Bathing-I  Transferring-I    IADL  Medications-I  Shopping-I  Finances-I  Cooking-I  Cleaning-I        ADVANCED CARE PLANNING:  poa is     FULL CODE    DEPRESSION/MOOD:  GEOVANNA:denies  PHQ9:denies  Mother  age 62 suicide       The following have been reviewed and updated as appropriate in this visit: active problem list, medication list, allergies, family history, social history, health maintenance            Past Medical History:   Diagnosis Date   • Cerebral cavernoma    • History of tinnitus    • Osteoporosis        Past Surgical History:   Procedure Laterality Date   • BUNIONECTOMY Bilateral    • TONSILLECTOMY       Social History     Socioeconomic History   • Marital status:      Spouse name: Not on file   • Number of children: Not on file   • Years of education: Not on file   • Highest education level: Not on file   Occupational History   • Not on file   Tobacco Use   • Smoking status: Never Smoker   • Smokeless tobacco: Never Used   Substance and Sexual Activity   • Alcohol use: Yes     Comment: events   • Drug use: Never   • Sexual activity: Defer   Other Topics Concern   • Not on file   Social History  Narrative   • Not on file     Social Determinants of Health     Financial Resource Strain:    • Difficulty of Paying Living Expenses:    Food Insecurity:    • Worried About Running Out of Food in the Last Year:    • Ran Out of Food in the Last Year:    Transportation Needs:    • Lack of Transportation (Medical):    • Lack of Transportation (Non-Medical):    Physical Activity:    • Days of Exercise per Week:    • Minutes of Exercise per Session:    Stress:    • Feeling of Stress :    Social Connections:    • Frequency of Communication with Friends and Family:    • Frequency of Social Gatherings with Friends and Family:    • Attends Evangelical Services:    • Active Member of Clubs or Organizations:    • Attends Club or Organization Meetings:    • Marital Status:    Intimate Partner Violence:    • Fear of Current or Ex-Partner:    • Emotionally Abused:    • Physically Abused:    • Sexually Abused:          Family History   Problem Relation Age of Onset   • Diverticulitis Biological Mother    • Glaucoma Biological Father          Allergies   Allergen Reactions   • No Known Allergies          Current Outpatient Medications   Medication Sig Dispense Refill   • aspirin 81 mg enteric coated tablet Take 81 mg by mouth daily.     • clopidogreL (PLAVIX) 75 mg tablet Take 75 mg by mouth daily.     • rosuvastatin (CRESTOR) 5 mg tablet Take 5 mg by mouth daily.     • cholecalciferol, vitamin D3, 10 mcg/mL (400 unit/mL) oral drops Take 400 Units by mouth daily.     • multivitamin tablet Take by mouth daily.       No current facility-administered medications for this visit.       Review of Systems  ROS  See hpi.   General: Denies fatigue, weight loss or weight gain.    Head: Denies headaches trauma   Eyes: Denies blurry vision, diplopia, decreased vision.  Denies drainage or excessive tearing.  Ears: ++ tinnitis, denies decreased hearing, ear drainage  Nose: Denies rhinorrhea, epistaxi  Mouth: Denies dry mouth  Neck: Denies lumps,  "bumps.  Denies dysphagia, odynophagia  Pulmonary:  Denies shortness of breath, difficulty breathing, excessive drooling, change in sputum.   Cardiac: Denies chest pain, flip-flop sensation   GI/Abdomen: Denies vomit, nausea, diarrhea, constipation, hematochezia.  Denies abdominal pain.    Uro/: Denies hematuria, urgency, frequency, burning sensation with urination. Denies discharge.   Skin: Denies lumps, bumps, rash.   MSK:  Denies weakness, numbness, tingling.    Neuro: Denies confusion, vertigo.    Psych + anxious             VITALS  Vitals:    04/23/21 0953   BP: (!) 82/54   Pulse: 67   Temp: 36.5 °C (97.7 °F)   SpO2: 96%             Wt Readings from Last 3 Encounters:   04/23/21 45.8 kg (101 lb)   04/16/21 50.3 kg (111 lb)   04/08/21 49.9 kg (110 lb)       Ht Readings from Last 3 Encounters:   04/23/21 1.702 m (5' 7\")   04/16/21 1.702 m (5' 7\")   04/08/21 1.651 m (5' 5\")       BP Readings from Last 3 Encounters:   04/23/21 (!) 82/54   04/17/21 (!) 94/50   04/08/21 (!) 80/60       Physical Exam  PHYSICAL EXAM  General: Appears well, in no distress. Pt is pleasant. Hygiene normal. Thin frame  Head: NC/AT.   Eyes: Conjunctiva and sclera normal bilaterally. No lid-lag.  PERRL. EOMI.   Ears: No tenderness of external ears bilterally.  Tympanic membrane Intact bilaterally.  No cerumen impaction. No erythema of canals bilaterally.   Nose: Inferior turbinates normal bilaterally.   Mouth: Oral mucosa pink and moist.No erythema or edema of oral pharynx. No tonsillar exudates or hypertrophy. Uvula midline and without swelling.   Neck: Inspection normal. Trachea midline. supple, FROM without pain. No cervical lymphadenopathy.  No enlargement of thyroid.  Cardiac: regular, rate, and rhythm. No murmurs, rubs, or gallops.  Lungs: negative for respiratory distress with normal respiratory effort. Lungs clear to auscultation bilaterally. No wheezes, rales, or rhonchi. No intercostal retractions  Abdomen: +BS. Bowel sounds " normal. Abdomen is soft, non-distended. No tenderness. No masses or organomegaly. No guarding.   Skin: warm and dry. No erythema or rash.  Neuro: CNII-XII intact.   Extremities: No peripheral edema or extremity lymphadenopathy  MSK: 5/5 UE and LE b/l. Gait stable and intact. Sitting Balance stable.   Psych: linear. Normal mood and affect.  No SI/HI. AAOX3.         PERTINENT LABS, IMAGING    No new labs.     Lab Results   Component Value Date    WBC 4.90 04/17/2021    HGB 13.1 04/17/2021    HCT 38.3 04/17/2021    MCV 94.8 04/17/2021     04/17/2021         Chemistry        Component Value Date/Time     (L) 04/17/2021 1235    K 4.3 04/17/2021 1235     04/17/2021 1235    CO2 26 04/17/2021 1235    BUN 15 04/17/2021 1235    CREATININE 0.6 04/17/2021 1235        Component Value Date/Time    CALCIUM 9.0 04/17/2021 1235    ALKPHOS 70 04/16/2021 1812    AST 38 04/16/2021 1812    ALT 30 04/16/2021 1812    BILITOT 0.6 04/16/2021 1812            Lab Results   Component Value Date    CHOL 191 04/17/2021    CHOL 209 (H) 09/18/2017    CHOL 193 03/16/2017     Lab Results   Component Value Date    HDL 71 04/17/2021    HDL 81 09/18/2017    HDL 76 03/16/2017     Lab Results   Component Value Date    LDLCALC 113 (H) 04/17/2021    LDLCALC 117 (H) 09/18/2017    LDLCALC 99 03/16/2017     Lab Results   Component Value Date    TRIG 37 04/17/2021    TRIG 54 09/18/2017    TRIG 91 03/16/2017     No results found for: CHOLHDL    Lab Results   Component Value Date    TSH 4.56 04/16/2021       Lab Results   Component Value Date    HGBA1C 4.8 04/17/2021       No results found for: HAV, HEPAIGM, HEPBIGM, HEPBCAB, HBEAG, HEPCAB    No results found for: MICROALBUR, [id number]    No results found for this or any previous visit (from the past 24 hour(s)).    CT ANGIOGRAPHY HEAD W WO IV CONTRAST    Result Date: 4/17/2021  CLINICAL HISTORY: Transient ischemic attack.  Refractory tinnitus in the left ear.  Visual distortion in both eyes  and stuttering speech. Subsequent development of a severe headache. COMMENT: A CT angiogram of the neck and [place] of [place] was performed following the uncomplicated administration of 100 cc of Omnipaque 350.  3D MIP and/or volume rendered image reconstructions were performed and reviewed.  In addition, venous phase images were obtained to assess for venous thrombosis. CT DOSE:  One or more dose reduction techniques (e.g. automated exposure control, adjustment of the mA and/or kV according to patient size, use of iterative reconstruction technique) utilized for this examination. Comparison: Unenhanced CT scan performed 4/16/2021 and MRI performed 7/10/2020 Angiographic findings: There is a standard three-vessel anatomy of the aortic arch. The origins of the great vessels are patent. The right common carotid artery is patent throughout its visualized cervical course. The right carotid bifurcation is widely patent, without evidence of acute traumatic injury or of a significant focal stenosis using NASCET criteria. The remainder of the cervical right internal carotid artery is patent as is the visualized right external carotid artery. The left common carotid artery is patent throughout its visualized cervical course. The left carotid bifurcation is widely patent, without evidence of acute traumatic injury or of a significant focal stenosis using NASCET criteria. The remainder of the cervical left internal carotid artery is patent as is the visualized left external carotid artery. Both vertebral arteries are patent throughout their visualized cervical and intracranial courses noting mild dominance of the right vertebral artery. There is no evidence of acute traumatic injury to the vertebral arteries. The basilar artery remains patent.  The right posterior cerebral artery is patent.  The P1 segment of the left posterior cerebral artery is hypoplastic.  A prominent left-sided posterior communicating artery is present  which supplies the left P2 segment. Intracranially, the internal carotid arteries are patent noting calcified atherosclerosis.  The A1 segment of the right anterior cerebral artery is unremarkable.  The left A1 segment is tiny in caliber.  Both A2 segments are patent.  The middle cerebral arteries are patent bilaterally. No aneurysm is identified about the [place] of [place]. Cerebral veins: The superior sagittal, straight, bilateral transverse and bilateral sigmoid sinuses appear patent. The internal jugular veins and the remainder of the intracerebral veins are unremarkable. Soft tissue findings: Streak artifact from dental amalgam limits evaluation.  No gross abnormally enhancing exophytic nasopharyngeal, oropharyngeal, hypopharyngeal or laryngeal mass lesion is appreciated.  The parotid and submandibular glands are unremarkable.  The thyroid gland is heterogeneous with multiple subcentimeter lesions.  These do not require follow-up as per the ACR white paper criteria for incidental lesions.  No pathologically enlarged or necrotic cervical lymphadenopathy is identified.  Degenerative changes are present in the cervical spine.  The patient's known left thalamic cavernoma with an associated developmental venous anomaly is again noted but is better assessed by MRI technique.  The previously seen faint enhancement in the yaz thought to represent a capillary telangiectasia is better assessed by MRI technique.  The visualized portions of the paranasal sinuses and mastoid air cells appear patent.     IMPRESSION: 1.  No evidence of a significant focal stenosis at the carotid bifurcations using NASCET criteria.  Patent cervical carotid arteries. 2.  Atherosclerosis involving the cavernous and supraclinoid internal carotid arteries without evidence of a significant focal stenosis.  Patent [place] of [place] noting variant anatomy, as above. 3.  No evidence of a dural venous sinus thrombosis. 4.  Additional chronic and  incidental findings, as above.  Additional    CT ANGIOGRAPHY NECK W WO IV CONTRAST    Result Date: 4/17/2021  CLINICAL HISTORY: Transient ischemic attack.  Refractory tinnitus in the left ear.  Visual distortion in both eyes and stuttering speech. Subsequent development of a severe headache. COMMENT: A CT angiogram of the neck and [place] of [place] was performed following the uncomplicated administration of 100 cc of Omnipaque 350.  3D MIP and/or volume rendered image reconstructions were performed and reviewed.  In addition, venous phase images were obtained to assess for venous thrombosis. CT DOSE:  One or more dose reduction techniques (e.g. automated exposure control, adjustment of the mA and/or kV according to patient size, use of iterative reconstruction technique) utilized for this examination. Comparison: Unenhanced CT scan performed 4/16/2021 and MRI performed 7/10/2020 Angiographic findings: There is a standard three-vessel anatomy of the aortic arch. The origins of the great vessels are patent. The right common carotid artery is patent throughout its visualized cervical course. The right carotid bifurcation is widely patent, without evidence of acute traumatic injury or of a significant focal stenosis using NASCET criteria. The remainder of the cervical right internal carotid artery is patent as is the visualized right external carotid artery. The left common carotid artery is patent throughout its visualized cervical course. The left carotid bifurcation is widely patent, without evidence of acute traumatic injury or of a significant focal stenosis using NASCET criteria. The remainder of the cervical left internal carotid artery is patent as is the visualized left external carotid artery. Both vertebral arteries are patent throughout their visualized cervical and intracranial courses noting mild dominance of the right vertebral artery. There is no evidence of acute traumatic injury to the vertebral  arteries. The basilar artery remains patent.  The right posterior cerebral artery is patent.  The P1 segment of the left posterior cerebral artery is hypoplastic.  A prominent left-sided posterior communicating artery is present which supplies the left P2 segment. Intracranially, the internal carotid arteries are patent noting calcified atherosclerosis.  The A1 segment of the right anterior cerebral artery is unremarkable.  The left A1 segment is tiny in caliber.  Both A2 segments are patent.  The middle cerebral arteries are patent bilaterally. No aneurysm is identified about the [place] of [place]. Cerebral veins: The superior sagittal, straight, bilateral transverse and bilateral sigmoid sinuses appear patent. The internal jugular veins and the remainder of the intracerebral veins are unremarkable. Soft tissue findings: Streak artifact from dental amalgam limits evaluation.  No gross abnormally enhancing exophytic nasopharyngeal, oropharyngeal, hypopharyngeal or laryngeal mass lesion is appreciated.  The parotid and submandibular glands are unremarkable.  The thyroid gland is heterogeneous with multiple subcentimeter lesions.  These do not require follow-up as per the ACR white paper criteria for incidental lesions.  No pathologically enlarged or necrotic cervical lymphadenopathy is identified.  Degenerative changes are present in the cervical spine.  The patient's known left thalamic cavernoma with an associated developmental venous anomaly is again noted but is better assessed by MRI technique.  The previously seen faint enhancement in the yaz thought to represent a capillary telangiectasia is better assessed by MRI technique.  The visualized portions of the paranasal sinuses and mastoid air cells appear patent.     IMPRESSION: 1.  No evidence of a significant focal stenosis at the carotid bifurcations using NASCET criteria.  Patent cervical carotid arteries. 2.  Atherosclerosis involving the cavernous and  supraclinoid internal carotid arteries without evidence of a significant focal stenosis.  Patent [place] of [place] noting variant anatomy, as above. 3.  No evidence of a dural venous sinus thrombosis. 4.  Additional chronic and incidental findings, as above.  Additional    X-RAY CHEST 1 VIEW    Result Date: 4/16/2021  CLINICAL HISTORY: stroke alert TECHNIQUE: Frontal chest radiograph. COMPARISON: Chest radiograph 9/17/2017 FINDINGS: Lungs are clear. There is no pneumothorax. Cardiac silhouette within normal limits. There is no vascular congestion.     IMPRESSION: No acute pulmonary process.     CT HEAD STROKE ALERT    Result Date: 4/16/2021  CLINICAL HISTORY: Neuro deficit, acute, stroke suspected TECHNIQUE:   CT of the head without contrast. COMPARISON: MRI brain from 7/10/2020 CT DOSE:  One or more dose reduction techniques (e.g. automated exposure control, adjustment of the mA and/or kV according to patient size, use of iterative reconstruction technique) utilized for this examination. COMMENT: The ventricles and sulci are normal in size and configuration. The gray-white differentiation is preserved.  No extra axial collection. No intracranial hemorrhage. No mass effect, midline shift, or edema. No acute, large vascular territory infarct.  No apparent acute osseous findings in the calvarium. Patient has a known small left thalamic cavernoma which appears to be isodense measuring 6 mm     IMPRESSION: No acute intracranial process.  Patient's known small left thalamic cavernoma again seen. Finding:    Other   Acuity: Critical  Status:  CLOSED Critical read back was performed and results were read back by Mehdi Cortes resident physician, on 4/16/2021 6:43 PM               Immunization History   Administered Date(s) Administered   • Influenza Vaccine Quadrivalent 3 Yr And Older 11/01/2016   • Influenza Vaccine Quadrivalent Preservative Free 6 Mons and Up 11/08/2019   • Influenza, Unspecified 11/01/2016, 11/15/2016    • Tdap 2015         Health Maintenance   Topic Date Due   • Varicella Vaccines (1 of 2 - 2-dose childhood series) Never done   • HIV Screening  Never done   • Cervical Cancer Screening  Never done   • Zoster Vaccine (1 of 2) Never done   • Colonoscopy  Never done   • Influenza Vaccine (Season Ended) 2021   • Mammogram  2022   • DTaP, Tdap, and Td Vaccines (2 - Td) 2025   • Hepatitis C Screening  Completed   • Meningococcal ACWY  Aged Out   • HIB Vaccines  Aged Out   • IPV Vaccines  Aged Out   • HPV Vaccines  Aged Out   • Pneumococcal  Aged Out            Diagnosis Plan   1. TIA (transient ischemic attack)  Comprehensive metabolic panel    CBC and Differential    Comprehensive metabolic panel    CBC and Differential   2. Constipation, unspecified constipation type     3. Tinnitus of both ears     4. Hyponatremia  Comprehensive metabolic panel    Comprehensive metabolic panel   5. Anxiety           Nohemy Machuca is a 65 y.o. female  Working as a  [employer] living with  with incidental cerebral cavernoma, trigeminal neuralgia, osteoporosis presenting to review chronic conditions.          HEALTH MAINTENANCE    -- Pneumonia Immunization: not DUE  -- Influenza Immunization: give today  -- Shingles Immunization:shingles  -- Colorectal cancer screening:dr oleary 2017  q 3 years? Due 2020  -- Breast Cancer screening: mammo  Script 2020 and due 2021 given   -- Cervical cancer screening: DUE normal  q3-5 years  6/15/18 . See gyn across due   -- Bone Health screenin2017 +osteoporosis  / script given due  -- Vision Screening:intact normal   -- Hearing Screening: ent hearing loss     #TIA 2021    reviewed hospital course and imaging in depth and answered questions. She is appropriately anxious with incident given low risk factors ( absent diabetes, htn,hld ) and active with exercise.   Counseling provided   seeing neurology dr armond LYONS for  18 days then aspirin 81mg daily with crestor  LDL ~ 113   counseling provided  followup cardiology for etiology  Work up        #osteoporosis  DEXA 9/25/2017 +osteoporosis  +fam hx mother and M grandmother   + fracture left hand from shoveling   Taking MVI  Taking vit D3 1000  Ed and counseling and reviewed needed amounts of ca and vit D handout given to look at diet intake  Weight bearnig exercises- lifting and walking  Significant dental work and may need to see rheum or endo for osteoporosis management  dexa script given    #cavernoma left thalamas, cerebral    found on MRI  For admission for trigeminal neurlagia   followup adelina borja  q2 years MRI w and WO  Thought to be 2/2 mild trauma young no overt issues now at this time. surveillance    #trigeminal neuralgia  #tinnitis   No longer on elabil or tegreol  Seen by neuro  Dr leahy   Looking into accupuncture or tinnitus retraining audiology     #bl ETD and TMJ tensions  #bl high freq sensorineural hearing loss -- tinnitus   - seeing ent astelin nasal spray for allergic rhinitis  - she is not taking nasal spray regularly  - dr powers     #hypoNA  ~134   check level    #vitiligo- chronic, father. Not seenig derm       Today I spent 42 minutes reviewing records, hospitalization imaging and counseling, note writing   ordered labs   rec to do mammo, etc preventative cancer screenings   followup cardiology     Return in about 3 months (around 7/23/2021), or if symptoms worsen or fail to improve, for Followup with Dr. Ginger Gonzales.    Ginger Gonzales, DO

## 2021-07-27 ENCOUNTER — HOSPITAL ENCOUNTER (OUTPATIENT)
Dept: RADIOLOGY | Facility: CLINIC | Age: 66
Discharge: HOME | End: 2021-07-27
Attending: FAMILY MEDICINE
Payer: COMMERCIAL

## 2021-07-27 DIAGNOSIS — Q28.3 CEREBRAL CAVERNOMA: Chronic | ICD-10-CM

## 2021-07-27 DIAGNOSIS — Z12.31 ENCOUNTER FOR SCREENING MAMMOGRAM FOR MALIGNANT NEOPLASM OF BREAST: ICD-10-CM

## 2021-07-27 DIAGNOSIS — Z00.00 ANNUAL PHYSICAL EXAM: ICD-10-CM

## 2021-07-27 DIAGNOSIS — G50.0 TRIGEMINAL NEURALGIA: Chronic | ICD-10-CM

## 2021-07-27 PROCEDURE — 77067 SCR MAMMO BI INCL CAD: CPT

## 2021-07-30 ENCOUNTER — OFFICE VISIT (OUTPATIENT)
Dept: PRIMARY CARE | Facility: CLINIC | Age: 66
End: 2021-07-30
Payer: COMMERCIAL

## 2021-07-30 ENCOUNTER — TELEPHONE (OUTPATIENT)
Dept: PRIMARY CARE | Facility: CLINIC | Age: 66
End: 2021-07-30

## 2021-07-30 VITALS
HEIGHT: 65 IN | WEIGHT: 104.4 LBS | SYSTOLIC BLOOD PRESSURE: 80 MMHG | DIASTOLIC BLOOD PRESSURE: 60 MMHG | HEART RATE: 61 BPM | BODY MASS INDEX: 17.4 KG/M2 | OXYGEN SATURATION: 99 % | TEMPERATURE: 97.7 F

## 2021-07-30 DIAGNOSIS — E87.1 HYPONATREMIA: ICD-10-CM

## 2021-07-30 DIAGNOSIS — H93.13 TINNITUS OF BOTH EARS: ICD-10-CM

## 2021-07-30 DIAGNOSIS — G45.9 TIA (TRANSIENT ISCHEMIC ATTACK): ICD-10-CM

## 2021-07-30 DIAGNOSIS — M19.90 ARTHRITIS: Primary | ICD-10-CM

## 2021-07-30 PROCEDURE — 99214 OFFICE O/P EST MOD 30 MIN: CPT | Performed by: FAMILY MEDICINE

## 2021-07-30 PROCEDURE — 3008F BODY MASS INDEX DOCD: CPT | Performed by: FAMILY MEDICINE

## 2021-07-30 NOTE — PROGRESS NOTES
"            OFFICE VISIT NOTE     FEI PRECIAOD DO        PATIENT NAME:Nohemy Machuca  PATIENT : 1955  AMANDA: 2021    CC:   Chief Complaint   Patient presents with   • 3 mths f/u     arthritis         HPI   Nohemy Machuca is a 65 y.o. female  Working as a  [employer] living with  with incidental cerebral cavernoma, trigeminal neuralgia, osteoporosis presenting to review chronic conditions and recent new TIA diagnoses, and new arthritis     #arthritis  Polyarthritis sx started 2 weeks ago   bl wrist bursitis and swollen, red and painful (distal radial and ulnar region). This gets worse throughout the day  Left leg arthritis stiff and painful, swelling left knee  Did not try tylenol or topical salves     #TIA  She had vision, speech and gait issueswith post headache  2021   felt like she was under \"Water\" and had blurred vision  Her sx  Lasted for 1.5 hours then had residual headahce  She was in obs - andeval by neuro and did MRI MRA  Without imaging findings.   She was seen by neuro dr leahy who rec DAPT plavix and aspirin for 1 month then aspirin 81mg and crestor 5 daily  Yearly carotid US  rec to see cardiology dr spear and has this scheduled   she is anxious as she cannot comprehend the cause of her TIA   she denies headaches now, she denies blurred vision, vomit, dizziness. Chronic tinnitus   21:only only aspirin.  No longer on plavix and statin ( ASE mylagia)         HEALTH MAINTENANCE    -- Pneumonia Immunization: not DUE  -- Influenza Immunization: DUE declines   -- Shingles Immunization:shingles Due office next visit    -- Colorectal cancer screening:dr oleary   q 3 years? Due 2020 (declines)   -- Breast Cancer screening: mammo 21 normal   -- Cervical cancer screening: DUE normal  q3-5 years  6/15/18   -- Bone Health screenin2017 +osteoporosis  -- Vision Screening:intact normal   -- Hearing Screening: ent hearing loss   -- "   covid vaccination luis manuel     #osteoporosis  DEXA 2017 +osteoporosis  +fam hx mother and M grandmother  Taking MVI  Taking vit D3 1000  Weight bearnig exercises- lifting and walking   + fracture left hand from shoveling     #cavernoma left thalamas, cerebral    found on MRI  For admission for trigeminal neurlagia   followup adelina borja  q2 years MRI w and WO  Thought to be 2/2 mild trauma young no overt issues now at this time. Surveillance  7/10/2020 recent MRI   21: Ct anio unchanged hemangiomany     #trigeminal neuralgia  #tinnitus, bl   Tegretol 100 daily -> no longer on this  Seen by neuro  liporace and ENT tinnitus   Ongoing looking into audiology therapy and accupuncture-- and + effect  Once weekly appt     #bl ETD and TMJ tensions  #bl high freq sensorineural hearing loss -- tinnitus   - seeing ent astelin nasal spray for allergic rhinitis  - dr queen ENT, flavanoid     #vitiligo- chronic, father. Not seenig derm     SOCIAL HISTORY:  Denies etoh, drug, tobacco      FUNCTIONAL HISTORY:  ADL   Feeding-I  Toileting-I  Dressing-I  Bathing-I  Transferring-I    IADL  Medications-I  Shopping-I  Finances-I  Cooking-I  Cleaning-I        ADVANCED CARE PLANNING:  poa is     FULL CODE    DEPRESSION/MOOD:  GEOVANNA:denies  PHQ9:denies  Mother  age 62 suicide       The following have been reviewed and updated as appropriate in this visit: active problem list, medication list, allergies, family history, social history, health maintenance            Past Medical History:   Diagnosis Date   • Cerebral cavernoma    • History of tinnitus    • Osteoporosis        Past Surgical History:   Procedure Laterality Date   • BUNIONECTOMY Bilateral    • TONSILLECTOMY       Social History     Socioeconomic History   • Marital status:      Spouse name: Not on file   • Number of children: Not on file   • Years of education: Not on file   • Highest education level: Not on file   Occupational History   • Not  on file   Tobacco Use   • Smoking status: Never Smoker   • Smokeless tobacco: Never Used   Substance and Sexual Activity   • Alcohol use: Yes     Comment: events   • Drug use: Never   • Sexual activity: Defer   Other Topics Concern   • Not on file   Social History Narrative   • Not on file     Social Determinants of Health     Financial Resource Strain:    • Difficulty of Paying Living Expenses:    Food Insecurity:    • Worried About Running Out of Food in the Last Year:    • Ran Out of Food in the Last Year:    Transportation Needs:    • Lack of Transportation (Medical):    • Lack of Transportation (Non-Medical):    Physical Activity:    • Days of Exercise per Week:    • Minutes of Exercise per Session:    Stress:    • Feeling of Stress :    Social Connections:    • Frequency of Communication with Friends and Family:    • Frequency of Social Gatherings with Friends and Family:    • Attends [employer] Services:    • Active Member of Clubs or Organizations:    • Attends Club or Organization Meetings:    • Marital Status:    Intimate Partner Violence:    • Fear of Current or Ex-Partner:    • Emotionally Abused:    • Physically Abused:    • Sexually Abused:          Family History   Problem Relation Age of Onset   • Diverticulitis Biological Mother    • Glaucoma Biological Father          Allergies   Allergen Reactions   • No Known Allergies          Current Outpatient Medications   Medication Sig Dispense Refill   • aspirin 81 mg enteric coated tablet Take 81 mg by mouth daily.     • cholecalciferol, vitamin D3, 10 mcg/mL (400 unit/mL) oral drops Take 400 Units by mouth daily.     • clopidogreL (PLAVIX) 75 mg tablet Take 75 mg by mouth daily.     • multivitamin tablet Take by mouth daily.     • rosuvastatin (CRESTOR) 5 mg tablet Take 5 mg by mouth daily.       No current facility-administered medications for this visit.       Review of Systems  ROS  See hpi.   General: Denies fatigue, weight loss or weight gain.   "  Head: Denies headaches trauma   Eyes: Denies blurry vision, diplopia, decreased vision.  Denies drainage or excessive tearing.  Ears: + tinnitis, denies decreased hearing, ear drainage  Nose: Denies rhinorrhea, epistaxi  Mouth: Denies dry mouth  Neck: Denies lumps, bumps.  Denies dysphagia, odynophagia  Pulmonary:  Denies shortness of breath, difficulty breathing, excessive drooling, change in sputum.   Cardiac: Denies chest pain, flip-flop sensation   GI/Abdomen: Denies vomit, nausea, diarrhea, constipation, hematochezia.  Denies abdominal pain.    Uro/: Denies hematuria, urgency, frequency, burning sensation with urination. Denies discharge.   Skin: Denies lumps, bumps, rash.   MSK:  Denies weakness, numbness, tingling.    Neuro: Denies confusion, vertigo.   +arthralgias   Psych + anxious             VITALS  Vitals:    07/30/21 0911   BP: (!) 80/60   Pulse: 61   Temp: 36.5 °C (97.7 °F)   SpO2: 99%             Wt Readings from Last 3 Encounters:   04/23/21 45.8 kg (101 lb)   04/16/21 50.3 kg (111 lb)   04/08/21 49.9 kg (110 lb)       Ht Readings from Last 3 Encounters:   04/23/21 1.702 m (5' 7\")   04/16/21 1.702 m (5' 7\")   04/08/21 1.651 m (5' 5\")       BP Readings from Last 3 Encounters:   04/23/21 (!) 82/54   04/17/21 (!) 94/50   04/08/21 (!) 80/60       Physical Exam  PHYSICAL EXAM  General: Appears well, in no distress. Pt is pleasant. Hygiene normal. Thin frame  Head: NC/AT.   Eyes: Conjunctiva and sclera normal bilaterally. No lid-lag.  PERRL. EOMI.   Ears: No tenderness of external ears bilterally.  Tympanic membrane Intact bilaterally.  No cerumen impaction. No erythema of canals bilaterally.   Mouth: Oral mucosa pink and moist.No erythema or edema of oral pharynx. No tonsillar exudates or hypertrophy. Uvula midline and without swelling.   Neck: Inspection normal. Trachea midline. supple, FROM without pain. No cervical lymphadenopathy.  No enlargement of thyroid.  Cardiac: regular, rate, and rhythm. No " murmurs, rubs, or gallops.  Lungs: negative for respiratory distress with normal respiratory effort. Lungs clear to auscultation bilaterally. No wheezes, rales, or rhonchi. No intercostal retractions  Abdomen: +BS. Bowel sounds normal. Abdomen is soft, non-distended. No tenderness. No masses or organomegaly. No guarding.   Skin: warm and dry. No erythema or rash.  Extremities: No peripheral edema or extremity lymphadenopathy  Mild ulnar edema bl wrist without warmth or redness. Left quads tender to palpation but FROM knee bl without effusion.   MSK: 5/5 UE and LE b/l. Gait stable and intact. Sitting Balance stable.   Psych: linear. Normal mood and affect.  No SI/HI. AAOX3.         PERTINENT LABS, IMAGING    No new labs.     Lab Results   Component Value Date    WBC 4.90 04/17/2021    HGB 13.1 04/17/2021    HCT 38.3 04/17/2021    MCV 94.8 04/17/2021     04/17/2021         Chemistry        Component Value Date/Time     (L) 04/17/2021 1235    K 4.3 04/17/2021 1235     04/17/2021 1235    CO2 26 04/17/2021 1235    BUN 15 04/17/2021 1235    CREATININE 0.6 04/17/2021 1235        Component Value Date/Time    CALCIUM 9.0 04/17/2021 1235    ALKPHOS 70 04/16/2021 1812    AST 38 04/16/2021 1812    ALT 30 04/16/2021 1812    BILITOT 0.6 04/16/2021 1812            Lab Results   Component Value Date    CHOL 191 04/17/2021    CHOL 209 (H) 09/18/2017    CHOL 193 03/16/2017     Lab Results   Component Value Date    HDL 71 04/17/2021    HDL 81 09/18/2017    HDL 76 03/16/2017     Lab Results   Component Value Date    LDLCALC 113 (H) 04/17/2021    LDLCALC 117 (H) 09/18/2017    LDLCALC 99 03/16/2017     Lab Results   Component Value Date    TRIG 37 04/17/2021    TRIG 54 09/18/2017    TRIG 91 03/16/2017     No results found for: CHOLHDL    Lab Results   Component Value Date    TSH 4.56 04/16/2021       Lab Results   Component Value Date    HGBA1C 4.8 04/17/2021       No results found for: HAV, HEPAIGM, HEPBIGM, HEPBCAB,  HBEAG, HEPCAB    No results found for: MICROALBUR, [id number]    No results found for this or any previous visit (from the past 24 hour(s)).    BI SCREENING MAMMOGRAM BILATERAL(TOMOSYNTHESIS)    Result Date: 7/27/2021  CLINICAL HISTORY: Screening mammogram. This patient's lifetime risk of breast cancer according to a modified Ruth model, calculated based on information provided on the patient questionnaire, is 7.4%. COMMENT: Full field bilateral digital mammography was performed including 3-D tomosynthesis imaging.  Computer assisted detection was utilized during the interpretation of this examination. Comparison is made to available prior images. The breast tissue is heterogeneously dense, which may obscure small masses (breast density type C). No suspicious mass is seen. There is no suspicious architectural distortion. There are no suspicious calcifications. There is no suspicious interval change.     IMPRESSION: 1.  No evidence of malignancy. Annual mammography is recommended. Written results will be conveyed to the patient. FINAL ASSESSMENT - BI-RADS CATEGORY 1 - NEGATIVE  (NOR NC D)   HETEROGENEOUS The patient's information has been entered into our automated reminder system with a target due date for her next mammogram. Mammography reports should be evaluated with the following in mind: 1.  A report that is negative for malignancy should not delay biopsy if there is a dominant or clinically suspicious mass.  Some cancers are not visualized by mammography. 2.  In dense tissue, an underlying mass may be obscured.               Immunization History   Administered Date(s) Administered   • COVID-19 Unspecified Immunization 03/22/2021   • Influenza Vaccine Quadrivalent 3 Yr And Older 11/01/2016   • Influenza Vaccine Quadrivalent Preservative Free 6 Mons and Up 11/08/2019   • Influenza, Unspecified 11/01/2016, 11/15/2016   • Tdap 08/26/2015         Health Maintenance   Topic Date Due   • Varicella Vaccines (1 of 2 -  2-dose childhood series) Never done   • HIV Screening  Never done   • Cervical Cancer Screening  Never done   • Zoster Vaccine (1 of 2) Never done   • Colonoscopy  Never done   • COVID-19 Vaccine (2 - Inadvertent mRNA 2-dose series) 04/19/2021   • Influenza Vaccine (1) 08/01/2021   • Mammogram  07/27/2023   • DTaP, Tdap, and Td Vaccines (2 - Td or Tdap) 08/26/2025   • Hepatitis C Screening  Completed   • Meningococcal ACWY  Aged Out   • HIB Vaccines  Aged Out   • IPV Vaccines  Aged Out   • HPV Vaccines  Aged Out   • Pneumococcal  Aged Out            Diagnosis Plan   1. Arthritis  CBC and Differential    Comprehensive metabolic panel    Lyme Abs Total W/Reflex WB    Sedimentation rate    C-reactive protein    Rheumatoid factor    CCP IgG/IgA Antibodies    CBC and Differential    Comprehensive metabolic panel    Lyme Abs Total W/Reflex WB    Sedimentation rate    C-reactive protein    Rheumatoid factor    CCP IgG/IgA Antibodies   2. TIA (transient ischemic attack)     3. Tinnitus of both ears     4. Hyponatremia           Nohemy Machuca is a 65 y.o. female  Working as a  [employer] living with  with incidental cerebral cavernoma, trigeminal neuralgia, osteoporosis presenting to review chronic conditions.    #polyarthralgias   unclear but bl wrist and left knee, quads  Check esr, crp, cmp, cbc, lymes, rf, ccrp   referral to rheumatology   offered xray but decline.   Trial topical voltaren, oral tylenol, aspercreme  followup 3 months or worsening    #TIA April 2021    reviewed hospital course and imaging in depth and answered questions. She is appropriately anxious with incident given low risk factors ( absent diabetes, htn,hld ) and active with exercise.   Counseling provided   seeing neurology dr leahy  DAPT for 18 days then aspirin 81mg daily with crestor  LDL ~ 113   counseling provided  followup cardiology for etiology  Work up        #osteoporosis  DEXA 9/25/2017 +osteoporosis  +fam hx  mother and M grandmother   + fracture left hand from shoveling   Taking MVI  Taking vit D3 1000  Ed and counseling and reviewed needed amounts of ca and vit D handout given to look at diet intake  Weight bearnig exercises- lifting and walking  Significant dental work and may need to see rheum or endo for osteoporosis management  dexa script given    #cavernoma left thalamas, cerebral    found on MRI  For admission for trigeminal neurlagia   followup adelina borja  q2 years MRI w and WO  Thought to be 2/2 mild trauma young no overt issues now at this time. surveillance    #trigeminal neuralgia  #tinnitis   No longer on elabil or tegreol  Seen by neuro  Dr leahy   +effect accupuncture     #bl ETD and TMJ tensions  #bl high freq sensorineural hearing loss -- tinnitus   - seeing ent astelin nasal spray for allergic rhinitis  - she is not taking nasal spray regularly  - dr powers     #hypoNA  ~134   check level    #vitiligo- chronic, father. Not seenig derm     followup arthritis, neuro rec, rheum  shingrix and colonscopy, dexa     Return in about 6 months (around 1/30/2022), or if symptoms worsen or fail to improve, for Followup with Dr. Ginger Gonzales.    Ginger Gonzales, DO

## 2021-07-30 NOTE — TELEPHONE ENCOUNTER
----- Message from Chantel Campbell MA sent at 7/29/2021  8:20 AM EDT -----    ----- Message -----  From: Ginger Gonzales DO  Sent: 7/27/2021   1:34 PM EDT  To: Chantel Campbell MA    Please let her know her mammogram was normal and to continue yearly check thank you

## 2021-09-07 ENCOUNTER — TRANSCRIBE ORDERS (OUTPATIENT)
Dept: SCHEDULING | Age: 66
End: 2021-09-07
Payer: COMMERCIAL

## 2021-09-07 DIAGNOSIS — R76.8 OTHER SPECIFIED ABNORMAL IMMUNOLOGICAL FINDINGS IN SERUM: Primary | ICD-10-CM

## 2021-09-08 ENCOUNTER — HOSPITAL ENCOUNTER (OUTPATIENT)
Dept: RADIOLOGY | Facility: HOSPITAL | Age: 66
Discharge: HOME | End: 2021-09-08
Payer: COMMERCIAL

## 2021-09-08 DIAGNOSIS — R76.8 OTHER SPECIFIED ABNORMAL IMMUNOLOGICAL FINDINGS IN SERUM: ICD-10-CM

## 2021-09-08 PROCEDURE — 76705 ECHO EXAM OF ABDOMEN: CPT

## 2021-09-10 ENCOUNTER — TELEPHONE (OUTPATIENT)
Dept: PRIMARY CARE | Facility: CLINIC | Age: 66
End: 2021-09-10

## 2021-09-10 NOTE — TELEPHONE ENCOUNTER
Pt requesting Hep B vaccine.     Pt needs Hep B shot in order to be treated for rheumatoid arthritis, and would like to have this completed as soon as possible as she is in a lot of pain.

## 2021-10-26 ENCOUNTER — TRANSCRIBE ORDERS (OUTPATIENT)
Dept: SCHEDULING | Age: 66
End: 2021-10-26
Payer: COMMERCIAL

## 2021-10-26 DIAGNOSIS — M85.80 OTHER SPECIFIED DISORDERS OF BONE DENSITY AND STRUCTURE, UNSPECIFIED SITE: Primary | ICD-10-CM

## 2021-10-26 DIAGNOSIS — M81.0 AGE-RELATED OSTEOPOROSIS WITHOUT CURRENT PATHOLOGICAL FRACTURE: ICD-10-CM

## 2021-10-27 ENCOUNTER — HOSPITAL ENCOUNTER (OUTPATIENT)
Dept: RADIOLOGY | Facility: HOSPITAL | Age: 66
Discharge: HOME | End: 2021-10-27
Attending: INTERNAL MEDICINE
Payer: COMMERCIAL

## 2021-10-27 DIAGNOSIS — M81.0 AGE-RELATED OSTEOPOROSIS WITHOUT CURRENT PATHOLOGICAL FRACTURE: ICD-10-CM

## 2021-10-27 DIAGNOSIS — M85.80 OTHER SPECIFIED DISORDERS OF BONE DENSITY AND STRUCTURE, UNSPECIFIED SITE: ICD-10-CM

## 2021-10-27 PROCEDURE — 77080 DXA BONE DENSITY AXIAL: CPT

## 2022-01-03 PROBLEM — B18.2 CHRONIC HEPATITIS C WITHOUT HEPATIC COMA (CMS/HCC): Status: ACTIVE | Noted: 2022-01-03

## 2022-01-03 RX ORDER — GABAPENTIN 300 MG/1
600 CAPSULE ORAL NIGHTLY
COMMUNITY
Start: 2021-12-01 | End: 2022-01-04

## 2022-01-03 NOTE — PROGRESS NOTES
"            OFFICE VISIT NOTE     FEI PRECIADO DO        PATIENT NAME:Nohemy Machuca  PATIENT : 1955  AMANDA: 1/3/2022    CC:   Chief Complaint   Patient presents with   • PAIN RIGHT HIP, LEFT SHOULDER     UNABLE TO OBTAIN O2 LEVEL         HPI   Nohemy Machuca is a 66 y.o. female  Working as a  [employer] living with  with incidental cerebral cavernoma, trigeminal neuralgia, osteoporosis presenting to review chronic conditions and recent new TIA diagnoses, and new arthritis       Neuro dr armond spear  [facility]   GI sheridan  Rheum marina   Eye dr hansen     #Hepc C, chronic   sofosbuvir - velpatiasvir 400-100 completed  Liver US negative   Seen by GI dr mcgarry     #arthritis  Polyarthritis sx started 2 weeks ago   bl wrist bursitis and swollen, red and painful (distal radial and ulnar region). This gets worse throughout the day  Left leg arthritis stiff and painful, swelling left knee  Did not try tylenol or topical salves   22: seeing rheum  Dr yimi gray     now in remicade (Was Taking xeljanz 5mg po bid and)  On methylpred 12mg daily       #TIA  She had vision, speech and gait issueswith post headache  2021   felt like she was under \"Water\" and had blurred vision  Her sx  Lasted for 1.5 hours then had residual headahce  She was in obs - andeval by neuro and did MRI MRA  Without imaging findings.   She was seen by neuro dr leahy who rec DAPT plavix and aspirin for 1 month then aspirin 81mg and crestor 5 daily  Yearly carotid US  rec to see cardiology dr spear and has this scheduled   she is anxious as she cannot comprehend the cause of her TIA   she denies headaches now, she denies blurred vision, vomit, dizziness. Chronic tinnitus   21:only only aspirin.  No longer on plavix and statin ( ASE mylagia)   22 : neuro dr leahy. contn aspirin 81mg po daily   ( per patient NOT on crestor 5)  evalauted by cards dr spear  . Sound " masker bl ears ( dr martinez aud tinnitus)        HEALTH MAINTENANCE    -- Pneumonia Immunization: not DUE  -- Influenza Immunization: DUE declines   -- Shingles Immunization:shingles Due office next visit    -- Colorectal cancer screening:dr oleary   q 3 years? Due 2020 (declines)   -- Breast Cancer screening: mammo 21 normal   -- Cervical cancer screening: DUE normal  q3-5 years  6/15/18   -- Bone Health screenin2017, 10/27/21  +osteoporosis  -- Vision Screening:intact normal   -- Hearing Screening: ent hearing loss   -- 2021  covid vaccination luis manuel     #osteoporosis  DEXA 2017, 10/27/21  +osteoporosis  +fam hx mother and M grandmother  Taking MVI  Taking vit D3 1000  Weight bearnig exercises- lifting and walking   + fracture left hand from shoveling     #cavernoma left thalamas, cerebral    found on MRI  For admission for trigeminal neurlagia   followup adelina borja  q2 years MRI w and WO  Thought to be 2/2 mild trauma young no overt issues now at this time. Surveillance  7/10/2020 recent MRI   21: Ct anio unchanged hemangiomany   22: dr borja and dr gleason NS. Stable    #trigeminal neuralgia, right   #tinnitus, bl   Tegretol 100 daily -> no longer on this  Seen by neuro  armond and ENT tinnitus   Ongoing looking into audiology therapy and accupuncture-- and + effect  Once weekly appt   22: seeing neuro dr leahy increased gabapentin 300 and if severe then 600mg nightly since 2021     #bl ETD and TMJ tensions  #bl high freq sensorineural hearing loss -- tinnitus   - seeing ent astelin nasal spray for allergic rhinitis  - dr queen ENT, flavanoid     #vitiligo- chronic, father. Not seenig derm     #right retina laxation  Dr hansen     SOCIAL HISTORY:  Denies etoh, drug, tobacco      FUNCTIONAL HISTORY:  ADL   Feeding-I  Toileting-I  Dressing-I  Bathing-I  Transferring-I    IADL  Medications-I  Shopping-I  Finances-I  Cooking-I  Cleaning-I        ADVANCED  CARE PLANNING:  poa is     FULL CODE    DEPRESSION/MOOD:  GEOVANNA:denies  PHQ9:denies  Mother  age 62 suicide       The following have been reviewed and updated as appropriate in this visit: active problem list, medication list, allergies, family history, social history, health maintenance            Past Medical History:   Diagnosis Date   • Cerebral cavernoma    • History of tinnitus    • Osteoporosis        Past Surgical History:   Procedure Laterality Date   • BUNIONECTOMY Bilateral    • TONSILLECTOMY       Social History     Socioeconomic History   • Marital status:      Spouse name: Not on file   • Number of children: Not on file   • Years of education: Not on file   • Highest education level: Not on file   Occupational History   • Not on file   Tobacco Use   • Smoking status: Never Smoker   • Smokeless tobacco: Never Used   Substance and Sexual Activity   • Alcohol use: Yes     Comment: events   • Drug use: Never   • Sexual activity: Defer   Other Topics Concern   • Not on file   Social History Narrative   • Not on file     Social Determinants of Health     Financial Resource Strain: Not on file   Food Insecurity: Not on file   Transportation Needs: Not on file   Physical Activity: Not on file   Stress: Not on file   Social Connections: Not on file   Intimate Partner Violence: Not on file   Housing Stability: Not on file         Family History   Problem Relation Age of Onset   • Diverticulitis Biological Mother    • Glaucoma Biological Father          Allergies   Allergen Reactions   • No Known Allergies          Current Outpatient Medications   Medication Sig Dispense Refill   • aspirin 81 mg enteric coated tablet Take 81 mg by mouth daily.     • cholecalciferol, vitamin D3, 10 mcg/mL (400 unit/mL) oral drops Take 400 Units by mouth daily.     • multivitamin tablet Take by mouth daily.       No current facility-administered medications for this visit.       Review of Systems  ROS  See hpi.  "  General: Denies fatigue, weight loss or weight gain.    Head: Denies headaches trauma   Eyes: Denies blurry vision, diplopia, decreased vision.  Denies drainage or excessive tearing.  Ears: + tinnitis, denies decreased hearing, ear drainage  Nose: Denies rhinorrhea, epistaxi  Mouth: Denies dry mouth  Neck: Denies lumps, bumps.  Denies dysphagia, odynophagia  Pulmonary:  Denies shortness of breath, difficulty breathing, excessive drooling, change in sputum.   Cardiac: Denies chest pain, flip-flop sensation   GI/Abdomen: Denies vomit, nausea, diarrhea, constipation, hematochezia.  Denies abdominal pain.    Uro/: Denies hematuria, urgency, frequency, burning sensation with urination. Denies discharge.   Skin: Denies lumps, bumps, rash.   MSK:  Denies weakness,  +right lateral hip numbness, tingling.    +left shoulder scapular spine tenderness   Neuro: Denies confusion, vertigo.   +arthralgias   Psych + anxious             VITALS  Vitals:    01/04/22 1031   BP: 100/66   Pulse: 66   Temp: 36.4 °C (97.5 °F)             Wt Readings from Last 3 Encounters:   07/30/21 47.4 kg (104 lb 6.4 oz)   04/23/21 45.8 kg (101 lb)   04/16/21 50.3 kg (111 lb)       Ht Readings from Last 3 Encounters:   07/30/21 1.654 m (5' 5.1\")   04/23/21 1.702 m (5' 7\")   04/16/21 1.702 m (5' 7\")       BP Readings from Last 3 Encounters:   07/30/21 (!) 80/60   04/23/21 (!) 82/54   04/17/21 (!) 94/50       Physical Exam  PHYSICAL EXAM  General: Appears well, in no distress. Pt is pleasant. Hygiene normal. Thin frame  Head: NC/AT.   Eyes: Conjunctiva and sclera normal bilaterally. No lid-lag.  PERRL. EOMI.   Ears: No tenderness of external ears bilterally.  Tympanic membrane Intact bilaterally.  No cerumen impaction. No erythema of canals bilaterally.   Mouth: Oral mucosa pink and moist.No erythema or edema of oral pharynx. No tonsillar exudates or hypertrophy. Uvula midline and without swelling.   Neck: Inspection normal. Trachea midline. supple, " FROM without pain. No cervical lymphadenopathy.  No enlargement of thyroid.  Cardiac: regular, rate, and rhythm. No murmurs, rubs, or gallops.  Lungs: negative for respiratory distress with normal respiratory effort. Lungs clear to auscultation bilaterally. No wheezes, rales, or rhonchi. No intercostal retractions  Abdomen: +BS. Bowel sounds normal. Abdomen is soft, non-distended. No tenderness. No masses or organomegaly. No guarding.   Skin: warm and dry. No erythema or rash.  Extremities: No peripheral edema or extremity lymphadenopathy  Mild ulnar edema bl wrist without warmth or redness. Left quads tender to palpation but FROM knee bl without effusion.   MSK: 5/5 UE and LE b/l. Gait stable and intact. Sitting Balance stable.   +FROM  Sensation intact right hip   focal tenderness palpation left shoulder blade   Psych: linear. Normal mood and affect.  No SI/HI. AAOX3.         PERTINENT LABS, IMAGING    No new labs.     Lab Results   Component Value Date    WBC 4.90 04/17/2021    HGB 13.1 04/17/2021    HCT 38.3 04/17/2021    MCV 94.8 04/17/2021     04/17/2021         Chemistry        Component Value Date/Time     (L) 04/17/2021 1235    K 4.3 04/17/2021 1235     04/17/2021 1235    CO2 26 04/17/2021 1235    BUN 15 04/17/2021 1235    CREATININE 0.6 04/17/2021 1235        Component Value Date/Time    CALCIUM 9.0 04/17/2021 1235    ALKPHOS 70 04/16/2021 1812    AST 38 04/16/2021 1812    ALT 30 04/16/2021 1812    BILITOT 0.6 04/16/2021 1812            Lab Results   Component Value Date    CHOL 191 04/17/2021    CHOL 209 (H) 09/18/2017    CHOL 193 03/16/2017     Lab Results   Component Value Date    HDL 71 04/17/2021    HDL 81 09/18/2017    HDL 76 03/16/2017     Lab Results   Component Value Date    LDLCALC 113 (H) 04/17/2021    LDLCALC 117 (H) 09/18/2017    LDLCALC 99 03/16/2017     Lab Results   Component Value Date    TRIG 37 04/17/2021    TRIG 54 09/18/2017    TRIG 91 03/16/2017     No results found  for: CHOLHDL    Lab Results   Component Value Date    TSH 4.56 04/16/2021       Lab Results   Component Value Date    HGBA1C 4.8 04/17/2021       No results found for: HAV, HEPAIGM, HEPBIGM, HEPBCAB, HBEAG, HEPCAB    No results found for: MICROALBUR, [id number]    No results found for this or any previous visit (from the past 24 hour(s)).    No results found.            Immunization History   Administered Date(s) Administered   • COVID-19 Unspecified Immunization 03/22/2021   • Influenza Vaccine Quadrivalent 3 Yr And Older 11/01/2016   • Influenza Vaccine Quadrivalent Preservative Free 6 Mons and Up 11/08/2019   • Influenza, Unspecified 11/01/2016, 11/15/2016   • Pneumococcal Conjugate 13-Valent 09/27/2021   • Tdap 08/26/2015         Health Maintenance   Topic Date Due   • HIV Screening  Never done   • Cervical Cancer Screening  Never done   • Zoster Vaccine (1 of 2) Never done   • Colonoscopy  Never done   • COVID-19 Vaccine (2 - Inadvertent 3-dose series) 04/19/2021   • Influenza Vaccine (1) 08/01/2021   • Breast Cancer Screening  07/27/2023   • DTaP, Tdap, and Td Vaccines (2 - Td or Tdap) 08/26/2025   • Hepatitis C Screening  Completed   • Meningococcal ACWY  Aged Out   • HIB Vaccines  Aged Out   • IPV Vaccines  Aged Out   • HPV Vaccines  Aged Out   • Pneumococcal  Aged Out            Diagnosis Plan   1. Trigeminal neuralgia     2. TIA (transient ischemic attack)     3. Osteoporosis without current pathological fracture, unspecified osteoporosis type     4. Chronic hepatitis C without hepatic coma (CMS/HCC)     5. Pain of right lateral upper thigh     6. Meralgia paraesthetica, right           Nohemy Machuca is a 66 y.o. female  Working as a  [employer] living with  with incidental cerebral cavernoma, trigeminal neuralgia, osteoporosis presenting to review chronic conditions.    #right lateral thigh burning, numbness   only 2 day duration  No pain with ambulation or weight bearing  sounds  like right meralgia paresthetica vs onset shingles without rash   monitor for rash   trial topical lidocaine   if worsening consider empiric antiviral as she is already on steroids  Already on gabapentin  Review with neuro if not improving for poss emg?      #left shoulder scapular dysfunction   focal tenderness   topical voltaren and or prn tylenol  PT and Xray if worsening     #Hepc C, chronic-VL 0 cured    sofosbuvir - velpatiasvir 400-100  Liver US negative   Seen by GI dr mcgarry         #polyarthralgias   unclear but bl wrist and left knee, quads  Checked  esr, crp, cmp, cbc, lymes, rf, ccrp   referral to rheumatology   offered xray but decline.   Taking methylpred 12mg? And remicade defer to rhuem   Trial topical voltaren, oral tylenol, aspercreme  followup 3 months or worsening    #TIA April 2021    reviewed hospital course and imaging in depth and answered questions. She is appropriately anxious with incident given low risk factors ( absent diabetes, htn,hld ) and active with exercise.   Counseling provided   seeing neurology dr leahy  DAPT for 18 days then aspirin 81mg daily with crestor  LDL ~ 113   counseling provided  followup cardiology for etiology  Work up  1/4/22: per neuro dr leahyaspirin 81mg. Seen by card dr spear no changes . Clarify if taking crestor        #osteoporosis  DEXA 9/25/2017 , 10/2022  +osteoporosis  +fam hx mother and M grandmother   + fracture left hand from shoveling   Taking MVI  Taking vit D3 1000  Ed and counseling and reviewed needed amounts of ca and vit D handout given to look at diet intake  Weight bearnig exercises- lifting and walking  Significant dental work and may need to see rheum    fosomax weekly.        #cavernoma left thalamas, cerebral    found on MRI  For admission for trigeminal neurlagia   followup adelina jonh  q2 years MRI w and WO  Thought to be 2/2 mild trauma young no overt issues now at this time. surveillance    #trigeminal neuralgia, right    #tinnitis   No longer on elabil or tegreol  Seen by neuro  Dr leahy   +effect accupuncture   1/4/22: gabapentin 600mg. Neurology dr leahy     #bl ETD and TMJ tensions  #bl high freq sensorineural hearing loss -- tinnitus   - seeing ent astelin nasal spray for allergic rhinitis  - she is not taking nasal spray regularly  - dr powers   - dr queen       #vitiligo- chronic, father. Not seenig derm     followup arthritis, neuro rec, rheum  shingrix and colonscopy  fosomax     Return in about 6 months (around 7/4/2022).    Ginger Gonzales, DO

## 2022-01-04 ENCOUNTER — OFFICE VISIT (OUTPATIENT)
Dept: PRIMARY CARE | Facility: CLINIC | Age: 67
End: 2022-01-04
Payer: COMMERCIAL

## 2022-01-04 VITALS
TEMPERATURE: 97.5 F | HEART RATE: 66 BPM | DIASTOLIC BLOOD PRESSURE: 66 MMHG | WEIGHT: 101 LBS | HEIGHT: 67 IN | SYSTOLIC BLOOD PRESSURE: 100 MMHG | BODY MASS INDEX: 15.85 KG/M2

## 2022-01-04 DIAGNOSIS — M81.0 OSTEOPOROSIS WITHOUT CURRENT PATHOLOGICAL FRACTURE, UNSPECIFIED OSTEOPOROSIS TYPE: Chronic | ICD-10-CM

## 2022-01-04 DIAGNOSIS — M79.651 PAIN OF RIGHT LATERAL UPPER THIGH: ICD-10-CM

## 2022-01-04 DIAGNOSIS — G45.9 TIA (TRANSIENT ISCHEMIC ATTACK): ICD-10-CM

## 2022-01-04 DIAGNOSIS — M25.512 LEFT SHOULDER PAIN, UNSPECIFIED CHRONICITY: ICD-10-CM

## 2022-01-04 DIAGNOSIS — G57.11 MERALGIA PARAESTHETICA, RIGHT: ICD-10-CM

## 2022-01-04 DIAGNOSIS — G50.0 TRIGEMINAL NEURALGIA: Primary | Chronic | ICD-10-CM

## 2022-01-04 DIAGNOSIS — B18.2 CHRONIC HEPATITIS C WITHOUT HEPATIC COMA (CMS/HCC): ICD-10-CM

## 2022-01-04 PROCEDURE — 99396 PREV VISIT EST AGE 40-64: CPT | Performed by: FAMILY MEDICINE

## 2022-01-04 PROCEDURE — 3008F BODY MASS INDEX DOCD: CPT | Performed by: FAMILY MEDICINE

## 2022-01-04 PROCEDURE — 99214 OFFICE O/P EST MOD 30 MIN: CPT | Mod: 25 | Performed by: FAMILY MEDICINE

## 2022-01-04 RX ORDER — METHYLPREDNISOLONE 4 MG/1
12 TABLET ORAL DAILY
COMMUNITY
Start: 2022-01-04 | End: 2022-08-19 | Stop reason: ALTCHOICE

## 2022-01-04 RX ORDER — GABAPENTIN 300 MG/1
300 CAPSULE ORAL NIGHTLY
COMMUNITY
Start: 2022-01-04 | End: 2023-01-05 | Stop reason: ALTCHOICE

## 2022-01-04 RX ORDER — METHYLPREDNISOLONE 4 MG/1
4 TABLET ORAL DAILY
COMMUNITY
End: 2022-01-04

## 2022-01-04 RX ORDER — ALENDRONATE SODIUM 70 MG/1
70 TABLET ORAL WEEKLY
COMMUNITY
Start: 2021-12-27

## 2022-03-17 ENCOUNTER — CONSULT (OUTPATIENT)
Dept: PRIMARY CARE | Facility: CLINIC | Age: 67
End: 2022-03-17
Payer: COMMERCIAL

## 2022-03-17 ENCOUNTER — TELEPHONE (OUTPATIENT)
Dept: PRIMARY CARE | Facility: CLINIC | Age: 67
End: 2022-03-17

## 2022-03-17 VITALS
DIASTOLIC BLOOD PRESSURE: 58 MMHG | SYSTOLIC BLOOD PRESSURE: 98 MMHG | BODY MASS INDEX: 15.54 KG/M2 | TEMPERATURE: 97.1 F | HEIGHT: 67 IN | HEART RATE: 60 BPM | WEIGHT: 99 LBS | OXYGEN SATURATION: 98 %

## 2022-03-17 DIAGNOSIS — G45.9 TIA (TRANSIENT ISCHEMIC ATTACK): ICD-10-CM

## 2022-03-17 DIAGNOSIS — Z01.818 PRE-OP EXAMINATION: Primary | ICD-10-CM

## 2022-03-17 DIAGNOSIS — Q28.3 CEREBRAL CAVERNOMA: Chronic | ICD-10-CM

## 2022-03-17 DIAGNOSIS — K62.3 RECTAL PROLAPSE: ICD-10-CM

## 2022-03-17 PROCEDURE — 99214 OFFICE O/P EST MOD 30 MIN: CPT

## 2022-03-17 PROCEDURE — 3008F BODY MASS INDEX DOCD: CPT

## 2022-03-17 NOTE — PROGRESS NOTES
AISHA Santiago  [facility]   Geriatric Medicine  [address]  [place] [address]  Phone: [phone]  Fax: [phone]     History of Present Illness     64 year old female patient of Dr. Gonzales presenting today for pre-op clearance. See below for surgical details:     REFERRING SURGEON: Clara Rosenthal MD  PROCEDURE: Proctopexy abdominal approach, proctopexy with sigmoid resection abdominal approach, ileostomy/jejunonscomy non-tube  DIAGNOSIS/CHIEF COMPLAINT: Recurrent rectal prolapse  DATE OF PROCEDURE: 3/30/2022  H&P: Required. Performed today.  EKG: Completed 3/15/2022  B/W: Completed 3/15/2022  COVID-19 testing: Per the surgeon. Tested negative 3/9/2022  COVID vaccination: J&J UTD  •      Past Medical/Surgical/Family/Social History     The following have been reviewed and updated as appropriate in this visit:          [facility]  Past Medical History:   Diagnosis Date   • Cerebral cavernoma    • History of tinnitus    • Osteoporosis        PAST SURGICAL HX  Past Surgical History:   Procedure Laterality Date   • BUNIONECTOMY Bilateral    • TONSILLECTOMY         [facility]  Family History   Problem Relation Age of Onset   • Diverticulitis Biological Mother    • Glaucoma Biological Father        PAST SOCIAL HX  Social History     Socioeconomic History   • Marital status:      Spouse name: Not on file   • Number of children: Not on file   • Years of education: Not on file   • Highest education level: Not on file   Occupational History   • Not on file   Tobacco Use   • Smoking status: Never Smoker   • Smokeless tobacco: Never Used   Substance and Sexual Activity   • Alcohol use: Yes     Comment: events   • Drug use: Never   • Sexual activity: Defer   Other Topics Concern   • Not on file   Social History Narrative   • Not on file     Social Determinants of Health     Financial Resource Strain: Not on file   Food Insecurity: Not on file   Transportation Needs: Not on file    Physical Activity: Not on file   Stress: Not on file   Social Connections: Not on file   Intimate Partner Violence: Not on file   Housing Stability: Not on file        Allergies and Medications     ALLERGIES  Allergies   Allergen Reactions   • No Known Allergies        CURRENT MEDS  Current Outpatient Medications   Medication Sig Dispense Refill   • alendronate 70 mg tablet Take 70 mg by mouth once a week.     • cholecalciferol, vitamin D3, 10 mcg/mL (400 unit/mL) oral drops Take 400 Units by mouth daily.     • gabapentin 300 mg capsule Take 1 capsule (300 mg total) by mouth nightly.     • methylPREDNISolone 4 mg tablet Take 3 tablets (12 mg total) by mouth daily. 12 mg at once- -Rheum     • multivitamin tablet Take by mouth daily.       No current facility-administered medications for this visit.        Review of Systems     Review of Systems   • Constitutional: Negative for activity change, appetite change, chills, diaphoresis, fatigue, fever and unexpected weight change. Denies anosmia, recent infections, or any COVID-19-concerning symptoms.  • HENT: No URI symptoms. No PND, sore throat, nasal congestion.    • Eyes: No acute vision changes.   • Respiratory: No cough, SOB, NICOLE, wheezing.   • Cardiovascular: No CP, palpitations, edema, activity intolerance.  • Gastrointestinal: No abdominal pain, bowel pattern changes, n/v/d/c. No GERD at this time. Irritation and discomfort 2/2 rectal prolapse. Reports long hx of constipation/straining  • Genitourinary: No UTI symptoms reported. Endorses mild itching from yeast infection which she is currently treating with topical miconazole with relief  • Musculoskeletal: No new musculoskeletal pains, ROM changes, joint pains, neck pain, body aches. No weakness.   • Skin: No skin color changes, rashes, s/s of cellulitis or infections. No wounds or lacs.   • Neurological: Negative for dizziness, syncope, weakness, light-headedness, numbness and headaches. No new headaches or  "migraine syndromes.   • Hematological: Negative for adenopathy. Does not bruise/bleed easily.   • Psychiatric/Behavioral: No new stress, anxiety, depression, sleep problems, SI, HI, thoughts of self-harm.      Physical Examination     TODAY'S VITALS  Vitals:    03/17/22 0903   BP: (!) 98/58   BP Location: Left upper arm   Pulse: 60   Temp: 36.2 °C (97.1 °F)   SpO2: 98%   Weight: 44.9 kg (99 lb)   Height: 1.702 m (5' 7\")       BLOOD PRESSURE TRENDS  BP Readings from Last 2 Encounters:   03/17/22 (!) 98/58   01/04/22 100/66       HEIGHT & WEIGHT TRENDS  Wt Readings from Last 2 Encounters:   03/17/22 44.9 kg (99 lb)   01/04/22 45.8 kg (101 lb)     Ht Readings from Last 2 Encounters:   03/17/22 1.702 m (5' 7\")   01/04/22 1.702 m (5' 7\")       Body mass index is 15.51 kg/m².     Physical Exam:    General  • NAD. Appears comfortable, relaxed. No acute pain. Well-groomed. BMI noted. See VS/Trends.   • No major weight changes.   • No activity changes.   • Appetite is baseline.   • No fever, chills, tremors.    HEENT  • Head: Normocephalic. Atraumatic.   • Ears: External ear B/L WNL. No trauma. No obvious hearing deficits or complaints. No pain.   • Eyes: Conjunctiva is WNL B/L. No trauma seen at either eye. Periorbital regions are WNL. No lid abnormalities noted. No vision complaints or changes at this time.  • Nose: No notable nasal or sinus congestion.  • Throat: No pain. No vocal hoarseness.    Chest/Respiratory  • Lungs CTA B/L. No wheezing, stridor, rhonchi, rales/crackles.   • No SOB/NICOLE. POX remains stable on RA. RR WNL.     Cardiovascular  • AVSS. No CP, palpitations, SOB, NICOLE.   • Rhythm and rate are WNL/baseline. No arrhythmias. No ectopic beats auscultated.   • No obvious murmurs heard.     MSK  • Maintains normal gait. No recent falls. Does not require gait/ambulatory support at baseline.   • No acute pain or ROM complaints at this time.    Skin/Integ  • No new or recent skin infections/cellulitis. No erythema, " warmth, drainage, abscesses, discharge, or other infectious lesions.   • No rashes or dry skin.     Heme  • No new or unexplained bruising or bleeding of any kind.   • No lymphadenopathy as reported above.     Neuro  • AAO x 3. At mental/cognitive baseline. No obvious memory impairments noted or reported. Answers all questions appropriately.   • No anosmia. No focal or generalized weakness. No objective n/t or paresthesias. No pain.   • No headaches.     Mental Health  • No acute concerns at this time.        Laboratory Results     LAST CBC/DIFF  Lab Results   Component Value Date    WBC 4.90 04/17/2021    WBC 8.45 04/16/2021    HGB 13.1 04/17/2021    HGB 14.4 04/16/2021    HCT 38.3 04/17/2021    HCT 40.3 04/16/2021    MCV 94.8 04/17/2021    MCV 91.4 04/16/2021     04/17/2021     04/16/2021        LAST BMP  Lab Results   Component Value Date    GLUCOSE 87 04/17/2021    CALCIUM 9.0 04/17/2021     (L) 04/17/2021    K 4.3 04/17/2021    CO2 26 04/17/2021     04/17/2021    BUN 15 04/17/2021    CREATININE 0.6 04/17/2021        LAST LFTs  Lab Results   Component Value Date    ALT 30 04/16/2021    AST 38 04/16/2021    ALKPHOS 70 04/16/2021    BILITOT 0.6 04/16/2021        LAST LIPID PANEL  Lab Results   Component Value Date    CHOL 191 04/17/2021     Lab Results   Component Value Date    HDL 71 04/17/2021     Lab Results   Component Value Date    LDLCALC 113 (H) 04/17/2021     Lab Results   Component Value Date    TRIG 37 04/17/2021       LAST THYROID STUDIES  Lab Results   Component Value Date    TSH 4.56 04/16/2021        LAST A1C  Lab Results   Component Value Date    HGBA1C 4.8 04/17/2021       IMMUNIZATIONS  Immunization History   Administered Date(s) Administered   • COVID-19 Unspecified Immunization 03/22/2021   • Influenza Vaccine Quadrivalent 3 Yr And Older 11/01/2016   • Influenza Vaccine Quadrivalent Preservative Free 6 Mons and Up 11/08/2019   • Influenza, Unspecified 11/01/2016,  11/15/2016   • Pneumococcal Conjugate 13-Valent 09/27/2021   • Tdap 08/26/2015        Assessment and Plan     Assessment/Plan     Problem List Items Addressed This Visit        Circulatory    Cerebral cavernoma (Chronic)    TIA (transient ischemic attack)    · Diagnosis noted in chart/at visit today.   · No changes in current care or regimen.   · Patient offers no interval updates at this time.   · Continue care per PCP and/or specialist.   · Reviewed red-flag symptoms and indications to present to the ER for further evaluation.    · Verbalized an understanding of education.           Digestive    Rectal prolapse  She believes prolapse has worsened and is occurring more frequently. She does not feel she is able to fully reduce it, and a smart part still protrudes which causes discomfort and a pressure sensation. She endorses a long history of constipation and straining. No blood, mucous, or pus per rectum. Denies fevers, chills, abdominal pain, nausea, or vomiting. Last colonoscopy completed with Gio in 3/2022.        Other    Pre-op examination - Primary    · EKG: Completed 3/15/2022  · LABS: Completed 3/15/2022  · PHYSICAL EXAM: Completed today, see note.   · Previous history of adverse reactions to anesthesia: None reported.  · Risk for cardiopulmonary complication: Low risk. No recent or past MI noted in the PMH, no unstable or stable angina, no active or uncompensated heart failure, no present or past history of arrhythmias, v-fib, or SCD (personal or familial history). No history of DOMO, COPD, asthma, PE, or other pulmonary conditions. Patient denies exertional chest pain/angina, NICOLE, or SOB. Able to tolerate activity and exercise.   · Risk for DVT/PE: Low-/no risk for this procedure. (Use surgical protocol prophylaxis for DVT/PE as indicated in inpatient/surgical setting).  · Risk for ETOH abuse/withdrawal complications: Low to no risk/none.  · Metal implants: Pt denies any known implants, including joint  replacements, pacemakers, etc.  · Difficult airway: No known complications with airway.   · Medication adjustments: Adjustments are per the surgeon and per the prescriber. Patient will contact the associated/indicated providers for advice/guidance on any medication changes needed.                No follow-ups on file.    No orders of the defined types were placed in this encounter.       I spent 30 minutes on this date of service performing the following activities: obtaining history, performing examination, documenting, preparing for visit, obtaining / reviewing records, providing counseling and education, independently reviewing study/studies, communicating results and coordinating care.    AISHA Santiago    3/17/2022

## 2022-03-17 NOTE — TELEPHONE ENCOUNTER
Pt stated that the miconazole vaginal cream 2% that she have is  and would like the doctor to send a new script to the [facility]

## 2022-03-17 NOTE — TELEPHONE ENCOUNTER
Pt calling office stating medication is over the counter and do not need a return phone call regarding medication

## 2022-03-18 ENCOUNTER — TELEPHONE (OUTPATIENT)
Dept: PRIMARY CARE | Facility: CLINIC | Age: 67
End: 2022-03-18
Payer: COMMERCIAL

## 2022-03-18 ENCOUNTER — APPOINTMENT (OUTPATIENT)
Dept: LAB | Facility: CLINIC | Age: 67
End: 2022-03-18
Payer: COMMERCIAL

## 2022-03-18 DIAGNOSIS — R30.0 DYSURIA: ICD-10-CM

## 2022-03-18 DIAGNOSIS — R30.0 DYSURIA: Primary | ICD-10-CM

## 2022-03-18 LAB
BACTERIA URNS QL MICRO: ABNORMAL /HPF
BILIRUB UR QL STRIP.AUTO: NEGATIVE MG/DL
CLARITY UR REFRACT.AUTO: ABNORMAL
COLOR UR AUTO: YELLOW
GLUCOSE UR STRIP.AUTO-MCNC: NEGATIVE MG/DL
HGB UR QL STRIP.AUTO: NEGATIVE
HYALINE CASTS #/AREA URNS LPF: ABNORMAL /LPF
KETONES UR STRIP.AUTO-MCNC: NEGATIVE MG/DL
LEUKOCYTE ESTERASE UR QL STRIP.AUTO: NEGATIVE
MUCOUS THREADS URNS QL MICRO: ABNORMAL /LPF
NITRITE UR QL STRIP.AUTO: NEGATIVE
PH UR STRIP.AUTO: 7.5 [PH]
PROT UR QL STRIP.AUTO: 1
RBC #/AREA URNS HPF: ABNORMAL /HPF
SP GR UR REFRACT.AUTO: 1.02
SQUAMOUS URNS QL MICRO: ABNORMAL /HPF
UROBILINOGEN UR STRIP-ACNC: 0.2 EU/DL
WBC #/AREA URNS HPF: ABNORMAL /HPF

## 2022-03-18 PROCEDURE — 87086 URINE CULTURE/COLONY COUNT: CPT

## 2022-03-18 PROCEDURE — 81003 URINALYSIS AUTO W/O SCOPE: CPT

## 2022-03-18 NOTE — TELEPHONE ENCOUNTER
The patient left a message on the FD queue that her urine results from today has been resulted and wans to know if there is medication at the pharmacy please call.

## 2022-03-19 LAB — BACTERIA UR CULT: NORMAL

## 2022-03-21 ENCOUNTER — OFFICE VISIT (OUTPATIENT)
Dept: PRIMARY CARE | Facility: CLINIC | Age: 67
End: 2022-03-21
Payer: COMMERCIAL

## 2022-03-21 ENCOUNTER — TELEPHONE (OUTPATIENT)
Dept: PRIMARY CARE | Facility: CLINIC | Age: 67
End: 2022-03-21

## 2022-03-21 VITALS
DIASTOLIC BLOOD PRESSURE: 58 MMHG | RESPIRATION RATE: 15 BRPM | HEART RATE: 61 BPM | SYSTOLIC BLOOD PRESSURE: 106 MMHG | OXYGEN SATURATION: 99 %

## 2022-03-21 DIAGNOSIS — R10.2 PELVIC PRESSURE IN FEMALE: ICD-10-CM

## 2022-03-21 DIAGNOSIS — R39.9 UTI SYMPTOMS: ICD-10-CM

## 2022-03-21 DIAGNOSIS — N94.89 VAGINAL BURNING: Primary | ICD-10-CM

## 2022-03-21 LAB
BILIRUBIN, POC: NEGATIVE
BLOOD URINE, POC: NEGATIVE
CLARITY, POC: CLEAR
COLOR, POC: YELLOW
EXPIRATION DATE: NORMAL
GLUCOSE URINE, POC: NEGATIVE
KETONES, POC: NEGATIVE
LEUKOCYTE EST, POC: NEGATIVE
Lab: NORMAL
NITRITE, POC: NEGATIVE
PH, POC: 6.5
POCT MANUFACTURER: NORMAL
PROTEIN, POC: NEGATIVE
SPECIFIC GRAVITY, POC: 1.01
UROBILINOGEN, POC: 4

## 2022-03-21 PROCEDURE — 81003 URINALYSIS AUTO W/O SCOPE: CPT | Performed by: INTERNAL MEDICINE

## 2022-03-21 PROCEDURE — 99213 OFFICE O/P EST LOW 20 MIN: CPT | Performed by: INTERNAL MEDICINE

## 2022-03-21 ASSESSMENT — ENCOUNTER SYMPTOMS
DYSURIA: 1
FREQUENCY: 0

## 2022-03-21 NOTE — ASSESSMENT & PLAN NOTE
Pt with known significant rectal prolapse with 5 days of burning in urethral and vaginal area, not associated with urination, without increased urinary frequency.  Associated with pelvic pressure that was sudden onset.  - No vaginal prolapse or pelvic masses on exam  - UA normal 4 days ago and today  - Vaginal atrophy and dryness on exam-this may be the cause of burning  - Vaginal swabs for candida and BV sent  - Advised to FU with gynecologist

## 2022-03-21 NOTE — TELEPHONE ENCOUNTER
----- Message from AISHA Santiago sent at 3/18/2022  3:22 PM EDT -----  Will await full culture results before any potential ABX ordered--UA relatively unrevealing

## 2022-03-21 NOTE — TELEPHONE ENCOUNTER
----- Message from AISHA Santiago sent at 3/20/2022  1:52 PM EDT -----  Please let her know urine culture is negative and sx likely related to topical yeast infection as we discussed last week. Ty

## 2022-03-21 NOTE — PROGRESS NOTES
Dinorah Tolentino MD    [facility] - Family Medicine  [address]  [place] [address]  Phone: [phone]  Fax: [phone]     History of Present Illness     Subjective     Patient ID: Nohemy Machuca is a 66 y.o. female.    Patient presents for pelvic pressure and urethral burning.    Pt with Rectal prolapse surgery end of march.  Has lost 12 lbs in the last year because she is scared of what she can eat-tries to avoid lots of ruffage-changed her eating patterns.  Using metamucil regularly.  Pressure in low pelvis for 5 days  Normal urinalysis 4 days ago  Burning of urethra all of the time, not specifically with urination Worse at night.  When she did the clean catch and cleansed the urethra first just now, it burned a lot worse.  Was sleeping with cold pack.  Switched to heat of low pelvis which was better.  No increased urinary frequency  Denies new soaps, creams, medicated wipes she has been using       Past Medical/Surgical/Family/Social History       The following have been reviewed and updated as appropriate in this visit:            Past Medical History:   Diagnosis Date   • Cerebral cavernoma    • History of tinnitus    • Osteoporosis        Past Surgical History:   Procedure Laterality Date   • BUNIONECTOMY Bilateral    • TONSILLECTOMY         Family History   Problem Relation Age of Onset   • Diverticulitis Biological Mother    • Glaucoma Biological Father        Social History     Tobacco Use   • Smoking status: Never Smoker   • Smokeless tobacco: Never Used   Substance Use Topics   • Alcohol use: Yes     Comment: events   • Drug use: Never       Patient Care Team:  Ginger Gonzales DO as PCP - General (Internal Medicine)  Marva Damon MD as Consulting Physician (Neurology)     Allergies and Medications       Allergies   Allergen Reactions   • No Known Allergies        Current Outpatient Medications   Medication Sig Dispense Refill   • alendronate 70 mg tablet  "Take 70 mg by mouth once a week.     • cholecalciferol, vitamin D3, 10 mcg/mL (400 unit/mL) oral drops Take 400 Units by mouth daily.     • gabapentin 300 mg capsule Take 1 capsule (300 mg total) by mouth nightly.     • methylPREDNISolone 4 mg tablet Take 3 tablets (12 mg total) by mouth daily. 12 mg at once- -Rheum     • multivitamin tablet Take by mouth daily.       No current facility-administered medications for this visit.          Review of Systems       Review of Systems   Genitourinary: Positive for dysuria and pelvic pain. Negative for frequency, vaginal bleeding and vaginal pain.        Physical Examination       Objective     Vitals:    03/21/22 1138   BP: (!) 106/58   Pulse: 61   Resp: 15   SpO2: 99%       Pulse Readings from Last 5 Encounters:   03/21/22 61   03/17/22 60   01/04/22 66   07/30/21 61   04/23/21 67       Wt Readings from Last 5 Encounters:   03/17/22 44.9 kg (99 lb)   01/04/22 45.8 kg (101 lb)   07/30/21 47.4 kg (104 lb 6.4 oz)   04/23/21 45.8 kg (101 lb)   04/16/21 50.3 kg (111 lb)       Ht Readings from Last 5 Encounters:   03/17/22 1.702 m (5' 7\")   01/04/22 1.702 m (5' 7\")   07/30/21 1.654 m (5' 5.1\")   04/23/21 1.702 m (5' 7\")   04/16/21 1.702 m (5' 7\")       BP Readings from Last 5 Encounters:   03/21/22 (!) 106/58   03/17/22 (!) 98/58   01/04/22 100/66   07/30/21 (!) 80/60   04/23/21 (!) 82/54       BMI Readings from Last 4 Encounters:   03/17/22 15.51 kg/m²   01/04/22 15.82 kg/m²   07/30/21 17.32 kg/m²   04/23/21 15.82 kg/m²       Physical Exam  Constitutional:       Appearance: Normal appearance.   Eyes:      Conjunctiva/sclera: Conjunctivae normal.   Pulmonary:      Effort: Pulmonary effort is normal.   Abdominal:      Palpations: Abdomen is soft. There is no mass.      Tenderness: There is no abdominal tenderness.   Genitourinary:     Comments: Atrophy and dryness present.  Normal urethra, vagina on speculum exam.  Neurological:      Mental Status: She is alert.   Psychiatric:   "       Mood and Affect: Mood normal.         Behavior: Behavior normal.         Thought Content: Thought content normal.          Laboratory Results     Lab Results   Component Value Date    WBC 4.90 04/17/2021    WBC 8.45 04/16/2021    WBC 6.02 09/18/2020    HGB 13.1 04/17/2021    HGB 14.4 04/16/2021    HGB 13.0 09/18/2020    HCT 38.3 04/17/2021    HCT 40.3 04/16/2021    HCT 38.5 09/18/2020    MCV 94.8 04/17/2021    MCV 91.4 04/16/2021    MCV 99.7 (H) 09/18/2020     04/17/2021     04/16/2021     09/18/2020        Lab Results   Component Value Date    GLUCOSE 87 04/17/2021    GLUCOSE 91 04/17/2021    GLUCOSE 88 04/16/2021    CALCIUM 9.0 04/17/2021    CALCIUM 9.3 04/17/2021    CALCIUM 9.0 04/16/2021     (L) 04/17/2021     04/17/2021     (L) 04/16/2021    K 4.3 04/17/2021    K 4.4 04/17/2021    K 3.9 04/16/2021    CO2 26 04/17/2021    CO2 28 04/17/2021    CO2 25 04/16/2021     04/17/2021     04/17/2021     04/16/2021    BUN 15 04/17/2021    BUN 12 04/17/2021    BUN 15 04/16/2021    CREATININE 0.6 04/17/2021    CREATININE 0.5 (L) 04/17/2021    CREATININE 0.5 (L) 04/16/2021    ALKPHOS 70 04/16/2021    ALKPHOS 51 09/17/2017    ALKPHOS 67 03/16/2017    AST 38 04/16/2021    AST 31 09/17/2017    AST 32 03/16/2017    ALT 30 04/16/2021    ALT 26 09/17/2017    ALT 25 03/16/2017    BILITOT 0.6 04/16/2021    BILITOT 0.6 09/17/2017    BILITOT 0.4 03/16/2017    ALBUMIN 4.8 04/16/2021    ALBUMIN 4.5 09/17/2017    ALBUMIN 4.4 03/16/2017       Lab Results   Component Value Date    CHOL 191 04/17/2021    CHOL 209 (H) 09/18/2017    CHOL 193 03/16/2017     Lab Results   Component Value Date    HDL 71 04/17/2021    HDL 81 09/18/2017    HDL 76 03/16/2017     Lab Results   Component Value Date    LDLCALC 113 (H) 04/17/2021    LDLCALC 117 (H) 09/18/2017    LDLCALC 99 03/16/2017     Lab Results   Component Value Date    TRIG 37 04/17/2021    TRIG 54 09/18/2017    TRIG 91 03/16/2017        The 10-year ASCVD risk score (Aline HU Jr., et al., 2013) is: 3%    Values used to calculate the score:      Age: 66 years      Sex: Female      Is Non- : No      Diabetic: No      Tobacco smoker: No      Systolic Blood Pressure: 106 mmHg      Is BP treated: No      HDL Cholesterol: 71 mg/dL      Total Cholesterol: 191 mg/dL    Lab Results   Component Value Date    TSH 4.56 04/16/2021    TSH 2.34 01/04/2021    TSH 1.69 09/17/2017     No results found for: FREET4      Lab Results   Component Value Date    HGBA1C 4.8 04/17/2021       No results found for: CREATUR  No results found for: MICROALBUR  No results found for: ALBCRET    No results found for: HEPCAB    Lab Results   Component Value Date    MG 2.0 09/17/2017            Immunization History   Administered Date(s) Administered   • COVID-19 Unspecified Immunization 03/22/2021   • Influenza Vaccine Quadrivalent 3 Yr And Older 11/01/2016   • Influenza Vaccine Quadrivalent Preservative Free 6 Mons and Up 11/08/2019   • Influenza, Unspecified 11/01/2016, 11/15/2016   • Pneumococcal Conjugate 13-Valent 09/27/2021   • Tdap 08/26/2015         Health Maintenance Topics with due status: Overdue       Topic Date Due    HIV Screening Never done    Cervical Cancer Screening Never done    Colonoscopy Never done    Zoster Vaccine Never done    COVID-19 Vaccine 04/19/2021    Influenza Vaccine 08/01/2021    Pneumococcal 11/22/2021     Health Maintenance Topics with due status: Not Due       Topic Last Completion Date    DTaP, Tdap, and Td Vaccines 08/26/2015    Breast Cancer Screening 07/27/2021     Health Maintenance Topics with due status: Aged Out       Topic Date Due    Meningococcal ACWY Aged Out    HIB Vaccines Aged Out    IPV Vaccines Aged Out    HPV Vaccines Aged Out        Assessment and Plan       Assessment/Plan     Problem List Items Addressed This Visit        Nervous    Vaginal burning - Primary    Current Assessment & Plan     Pt with  known significant rectal prolapse with 5 days of burning in urethral and vaginal area, not associated with urination, without increased urinary frequency.  Associated with pelvic pressure that was sudden onset.  - No vaginal prolapse or pelvic masses on exam  - UA normal 4 days ago and today  - Vaginal atrophy and dryness on exam-this may be the cause of burning  - Vaginal swabs for candida and BV sent  - Advised to FU with gynecologist           Relevant Orders    Bacterial Vaginosis Panel    Candidiasis, PCR       Genitourinary    Pelvic pressure in female      Other Visit Diagnoses     UTI symptoms        Relevant Orders    POCT urinalysis, automated, w/o scope (Completed)          No follow-ups on file.    Orders Placed This Encounter   Procedures   • Bacterial Vaginosis Panel     Order Specific Question:   Specimen Source?     Answer:   vaginal     Order Specific Question:   Release to patient     Answer:   Immediate   • Candidiasis, PCR     Order Specific Question:   Release to patient     Answer:   Immediate   • POCT urinalysis, automated, w/o scope     Order Specific Question:   Release to patient     Answer:   Immediate              Dinorah Tolentino MD    3/21/2022

## 2022-03-21 NOTE — TELEPHONE ENCOUNTER
"SPOKE with pt re urine culture- result- pt states that the burning when urinating has stopped but began experiencing lower abdominal pain over the weekend- pt spoke with the \"ON CALL PROVIDER' lat night who recommended using a heating pad in the vaginal area- pt called again this morning with symptoms-and is questioning if her symptoms are related to her Prolapsed Rectum which she has procedure scheduled on 3/30/22 - appt set today with Dr Tolentino at 11:30 to evaluate  "

## 2022-03-22 ENCOUNTER — TELEPHONE (OUTPATIENT)
Dept: PRIMARY CARE | Facility: CLINIC | Age: 67
End: 2022-03-22
Payer: COMMERCIAL

## 2022-03-22 LAB
A VAGINAE DNA VAG QL NAA+PROBE: NORMAL SCORE
BVAB2 DNA VAG QL NAA+PROBE: NORMAL SCORE
MEGA1 DNA VAG QL NAA+PROBE: NORMAL SCORE

## 2022-03-23 ENCOUNTER — TELEPHONE (OUTPATIENT)
Dept: PRIMARY CARE | Facility: CLINIC | Age: 67
End: 2022-03-23
Payer: COMMERCIAL

## 2022-03-23 LAB
C ALBICANS DNA VAG QL NAA+PROBE: NEGATIVE
C GLABRATA DNA VAG QL NAA+PROBE: NEGATIVE
C KRUSEI DNA VAG QL NAA+PROBE: NEGATIVE
C LUSITANIAE DNA VAG QL NAA+PROBE: NEGATIVE
CANDIDA DNA VAG QL NAA+PROBE: NEGATIVE

## 2022-03-23 NOTE — TELEPHONE ENCOUNTER
I did not call her but can you please let her know that there was no yeast and no evidence of bacterial vaginosis on the swabs.

## 2022-03-23 NOTE — TELEPHONE ENCOUNTER
Pt received a call from this office and call back didn't not see a note of a call pt also would like to know if her urine results came in

## 2022-03-23 NOTE — TELEPHONE ENCOUNTER
"Pt stated she went to the GYN as she said was suggested for a second opinion. Pt states the GYN stated it looks like a yeast infection and said she was sure the swab would come back positive for yeast. Pt stated the GYN did not do another swab she only examined her, the GYN did not treat her she told her to continue using monistat.   The pt states she is \"concerned and scared because she is in pain and does not know what to do if it is not yeast\".  "

## 2022-03-24 RX ORDER — FLUCONAZOLE 150 MG/1
150 TABLET ORAL DAILY
Qty: 1 TABLET | Refills: 0 | Status: SHIPPED | OUTPATIENT
Start: 2022-03-24 | End: 2022-03-25

## 2022-03-24 NOTE — TELEPHONE ENCOUNTER
Pt informed and stated she has finished the monistat and has a little relief but is still experiencing burning. She stated she will try the fluconazole and keep us updated.

## 2022-03-24 NOTE — TELEPHONE ENCOUNTER
I am sending in for a one time dose of oral fluconazole to treat a yeast infection.  If she continues to have pelvic pan or pressure I recommend we do a transvaginal ultrasound.

## 2022-03-25 LAB
BACTERIA GENITAL AEROBE CULT: NORMAL
BACTERIA SPEC CULT: NORMAL

## 2022-04-11 ENCOUNTER — PATIENT OUTREACH (OUTPATIENT)
Dept: CASE MANAGEMENT | Facility: CLINIC | Age: 67
End: 2022-04-11
Payer: COMMERCIAL

## 2022-04-12 RX ORDER — METOCLOPRAMIDE 5 MG/1
5 TABLET ORAL 3 TIMES DAILY
COMMUNITY
Start: 2022-04-10 | End: 2022-04-17

## 2022-04-12 RX ORDER — OXYCODONE HYDROCHLORIDE 5 MG/1
5 TABLET ORAL EVERY 4 HOURS PRN
COMMUNITY
Start: 2022-04-01 | End: 2022-07-18

## 2022-04-12 ASSESSMENT — ENCOUNTER SYMPTOMS
FEVER: 0
SLEEP DISTURBANCE: 1
WEAKNESS: 0
CONSTIPATION: 0
DIARRHEA: 0
NERVOUS/ANXIOUS: 1
DIZZINESS: 0
BLOOD IN STOOL: 0
ABDOMINAL PAIN: 1
NAUSEA: 0
LIGHT-HEADEDNESS: 0
VOMITING: 0

## 2022-04-12 NOTE — PROGRESS NOTES
"  NAME: Nohemy Machuca    MRN: [id number]    YOB: 1955    Event Review:    Initial TCM Patient Outreach Date: 04/11/22    Assessment completed with: Patient  Patient stated reason for hospitalization: Rectal prolapse, infected hematoma  Discharge Diagnosis: Rectal prolapse, infected hematoma    Patient readmitted in the last 30 days: (!) Yes  Discharging Facility: [facility]  Date of Admission: 04/07/22  Date of Discharge: 04/10/22    Discharging Facilty SNF/Rehab: None          Patient's perception of their health status since discharge: Improving    HPI: Spoke with pt. Pt admitted 3/30-4/3/22 for rectal prolapse s/p open partial sigmoidectomy with rectopexy. Post-op complicated by bleeding. Pt returned to OR on 3/31 for washout. Discharged home 4/3/22. Pt readmitted with severe abdominal pain and constipation. CT abd/pelvis concerning for pelvic fluid collection-may represent fibrillar placed during surgery or early abscess. S/p barium enema-no leak at colorectal anastomosis.     Pt says she still has a lot of pain. Her incision goes from \"above navel to vagina\" per pt. Pt denies redness/drainage from incision. Has some jelly like rectal discharge. No diarrhea Reviewed discharge instruction and pt to expect some loose stools or diarrhea for 1-2 weeks. Pt to report blood in stool, blood clots to surgeon. Pt to follow low residue diet. Spent a lot of time reviewing low residue diet with pt. Reviewed activity-walking recommended. Pt to avoid strenuous activities. Pt to avoid lifting more that 10-15 lbs for 6-8 weeks.     Pt says she is having difficulty sleeping. She didn't realize she was going to have this many issues with the surgery. She initially wanted to do the rubber band surgery. Surgeon told her it would only be 30% effective. She discussed surgery with several people and eventually agreed. She has been very stressed and having nightmares since the surgery. Pt would " like something to help her sleep.     Pt unable to review medication list. She said some of the medications on the list she no longer takes. She requested call another day to review.      Review of Systems   Constitutional: Negative for fever.   Gastrointestinal: Positive for abdominal pain. Negative for blood in stool, constipation, diarrhea, nausea and vomiting.   Neurological: Negative for dizziness, weakness and light-headedness.   Psychiatric/Behavioral: Positive for sleep disturbance. The patient is nervous/anxious.        Medication Review:    Medication Review: No  If No, please explain: Pt declined     Any new medications prescribed at discharge?: Yes  Is the patient having any side effects they believe may be caused by any medication additions or changes?: No  New prescriptions filled?: Yes     Do you have enough of your regularly prescribed medications?: Yes       Medication adherence problem?: No  Was a medication discrepancy indentified?: No       Nursing Interventions: No intervention needed  Reconciled the current and discharge medications: No  Reviewed AVS (Discharge Instructions)?: Yes         Acute Pain:    Acute pain: Yes  Limitation of routine activities due to acute pain?: yes     Acute pain timing: intermittent  Location of acute pain: Abdominal/rectal    Chronic Pain:    Chronic pain: No    Diet/Nutrition:    Type of Diet: Low residue     Barriers to diet adherence: knowledge deficit  Diet Education/Teaching:  (Reviewed low residue diet listed on pt's discharge instructions.)    Home Care Services:    Home Care Interventions: No intervention required     Post-Discharge Durable Medical Equipment::    Durable Medical Equipment: None  Oxygen Use: No        DME Interventions: No intervention required    Home Management:    Living Arrangement: Spouse  Support System:: Spouse  Type of Residence: [address] [place] house  Home Monitoring: None  Any patient reported falls in the last 3 months?: No  Sikhism or  spiritual beliefs that impact treatment?: No    Appointment Scheduling:    Patient Scheduling Dispositions: Pt prefers to see specialist PCP appt 7/5/22    Follow-Ups:    Dr. Robledo (Colorectal Surgeon): 4/28    Interventions/ Care Coordination:    Interventions/ Care Coordination: Addressed a knowledge deficit, Encouraged patient to schedule with the PCP/Specialist  General Education: Post-Op instructions       Reviewed signs/symptoms of worsening condition or complication that necessitate a call to the Physician's office.  Educated patient on access to care.  RN phone number given for future care management.    Zulema May RN  [phone]

## 2022-04-21 ENCOUNTER — PATIENT OUTREACH (OUTPATIENT)
Dept: CASE MANAGEMENT | Facility: CLINIC | Age: 67
End: 2022-04-21
Payer: COMMERCIAL

## 2022-04-21 NOTE — PROGRESS NOTES
TCM f/u rectal prolapse, infected hematoma. Spoke with pt. Pt says she isn't doing well. Pt thinks she has a bowel blockage. Pt denies abdominal pain nausea, vomiting or fever. Pt was instructed by colorectal surgeon to take 1-2 doses MiraLax. They advised against MOM or suppositories. Pt was instructed by surgeon to increase fluid intake.     Pt says she has chronic constipation. She is concerned 2 MiraLax a day won't be enough. Pt has not had a bowel movement since Sunday. Encouraged pt to stay hydrated. Encouraged pt to walk. Reviewed surgeon's recommendations from today with pt. Pt is hoping to get some relief by tomorrow.     Will f/u next week.     Zulema May, ERICN, RN  [phone]

## 2022-04-29 ENCOUNTER — PATIENT OUTREACH (OUTPATIENT)
Dept: CASE MANAGEMENT | Facility: CLINIC | Age: 67
End: 2022-04-29
Payer: COMMERCIAL

## 2022-05-03 NOTE — PROGRESS NOTES
NAME: Nohemy Machuca    MRN: [id number]    YOB: 1955    Event Review:    Initial TCM Patient Outreach Date: 04/29/22          Discharge Diagnosis: Bowel Obstruction    Patient readmitted in the last 30 days: (!) Yes  Discharging Facility: [facility]  Date of Admission: 04/22/22  Date of Discharge: 04/28/22    Discharging Facilty SNF/Rehab: None        2 messages left for pt with no return call. Pt with recent admission for partial sigmoidectomy with rectopexy. No admitted with nausea, vomiting and abdominal pain. 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 S/p ex-lap ISMAEL. Aggressive bowel regimen. Pt to f/u with surgeon.     ERIC CervantesN, RN  [phone]

## 2022-07-06 ENCOUNTER — TELEPHONE (OUTPATIENT)
Dept: NEUROSURGERY | Facility: CLINIC | Age: 67
End: 2022-07-06
Payer: COMMERCIAL

## 2022-07-06 RX ORDER — LORAZEPAM 1 MG/1
0.5 TABLET ORAL ONCE
Qty: 2 TABLET | Refills: 0 | Status: SHIPPED | OUTPATIENT
Start: 2022-07-06 | End: 2022-08-19 | Stop reason: ALTCHOICE

## 2022-07-06 NOTE — TELEPHONE ENCOUNTER
MRI Brain approved  Auth # [id number]  Location: [facility]  Valid dates: 07/06/2022 - 09/03/2022  AIM      Spoke with patient, she will call and schedule. Patient requesting a refill of medication for imaging.

## 2022-07-18 ENCOUNTER — OFFICE VISIT (OUTPATIENT)
Dept: PRIMARY CARE | Facility: CLINIC | Age: 67
End: 2022-07-18
Payer: COMMERCIAL

## 2022-07-18 VITALS
RESPIRATION RATE: 15 BRPM | OXYGEN SATURATION: 100 % | HEIGHT: 65 IN | HEART RATE: 54 BPM | BODY MASS INDEX: 15.73 KG/M2 | SYSTOLIC BLOOD PRESSURE: 104 MMHG | DIASTOLIC BLOOD PRESSURE: 56 MMHG | WEIGHT: 94.4 LBS

## 2022-07-18 DIAGNOSIS — K59.01 SLOW TRANSIT CONSTIPATION: ICD-10-CM

## 2022-07-18 DIAGNOSIS — N95.2 VAGINAL ATROPHY: Primary | ICD-10-CM

## 2022-07-18 DIAGNOSIS — M06.9 RHEUMATOID ARTHRITIS, INVOLVING UNSPECIFIED SITE, UNSPECIFIED WHETHER RHEUMATOID FACTOR PRESENT (CMS/HCC): ICD-10-CM

## 2022-07-18 PROBLEM — K56.609 SMALL BOWEL OBSTRUCTION (CMS/HCC): Status: ACTIVE | Noted: 2022-04-08

## 2022-07-18 PROBLEM — M25.50 JOINT PAIN: Status: ACTIVE | Noted: 2022-07-18

## 2022-07-18 PROBLEM — U07.1 COVID-19 VIRUS INFECTION: Status: ACTIVE | Noted: 2022-01-28

## 2022-07-18 PROCEDURE — 3008F BODY MASS INDEX DOCD: CPT | Performed by: INTERNAL MEDICINE

## 2022-07-18 PROCEDURE — 99213 OFFICE O/P EST LOW 20 MIN: CPT | Performed by: INTERNAL MEDICINE

## 2022-07-18 RX ORDER — HYDROCORTISONE 25 MG/G
CREAM TOPICAL 2 TIMES DAILY
Qty: 30 G | Refills: 1 | Status: SHIPPED | OUTPATIENT
Start: 2022-07-18 | End: 2022-08-19 | Stop reason: SDUPTHER

## 2022-07-18 RX ORDER — ESTRADIOL 0.1 MG/G
CREAM VAGINAL
Qty: 42.5 G | Refills: 0 | Status: SHIPPED | OUTPATIENT
Start: 2022-07-18 | End: 2022-11-02 | Stop reason: SDUPTHER

## 2022-07-18 ASSESSMENT — ENCOUNTER SYMPTOMS
CONSTIPATION: 1
ABDOMINAL PAIN: 1

## 2022-07-18 NOTE — PATIENT INSTRUCTIONS
Vaginal Atrophy or Dryness  -May get worse after menopause due to decreased estrogen in the vaginal tissue  -Non hormonal moisturizer such as Replens used 3 times per week can help maintain moisture and comfort  -Lubricants during sex:  Water based are easier to clean up but can get sticky.  Silicone based are longer lasting.  Natural oils (coconut or jojoba) also work well but can decrease the integrity of latex condoms.  -Vaginal topical estrogens (such as creams, pills or rings prescribed to be placed in the vagina) are safe, effective and can also reduce UTIs that can come with atrophy.  It takes about 2 months of regular use to see improvement.  They have not been shown to increase risk of DVT (blood clots), cardiovascular disease or endometrial cancer.  If you have a personal history of breast cancer however, it should be discussed with your oncologist.

## 2022-07-18 NOTE — PROGRESS NOTES
Dinorah Tolentino MD    [facility]  [address], [address]  [place] [address]  Phone: [phone]  Fax: [phone]     History of Present Illness     Subjective     Patient ID: Nohemy Machuca is a 66 y.o. female.    Patient comes in to discuss her chronic constipation.    Since I saw her last she has had rectal prolapse surgery, partial colectomy.  In march had bleeding from the prior surgery, had to have a drain placed temporarily.  Soon after had SBO, hospitalized, no surgery.  April 24th had abdominal bloating, vomiting.  Had volvulus?  Untwisted it.  Having difficulties with constipation since then.  She has been working with a motility specialist at [facility].    She reports she was advised to take 238 mg miralax to clean self out, didn't work, advised to take another dose.  Did xray on 7/13.  Showed backed up with stool.  As soon as eats or drinks water, in a lot pain.  Advised to take Trulance but she has not been able to get it yet.  Called motility doctor and this morning, advised to start FODMAPs diet.  Feels her rheumatoid arthritis is controleld with diet but between this and the FODMAPs diet it limits her too much.    Sat night had 3 cups of watery stool.    Pt reports she still has irritation of the labia and vulva, feels swollen at times, raw.    Pt has been using hydrocortisone cream on a lesion at her nailbed which has been helping.  Asks for a refill.       Past Medical/Surgical/Family/Social History       The following have been reviewed and updated as appropriate in this visit:            Past Medical History:   Diagnosis Date   • Cerebral cavernoma    • History of tinnitus    • Osteoporosis        Past Surgical History:   Procedure Laterality Date   • BUNIONECTOMY Bilateral    • TONSILLECTOMY         Family History   Problem Relation Age of Onset   • Diverticulitis Biological Mother    • Glaucoma Biological Father        Social History     Tobacco Use   •  Smoking status: Never Smoker   • Smokeless tobacco: Never Used   Substance Use Topics   • Alcohol use: Yes     Comment: events   • Drug use: Never       Patient Care Team:  Dinorah Tolentino MD as PCP - General (Internal Medicine)  Marva Damon MD as Consulting Physician (Neurology)       Allergies and Medications       Allergies   Allergen Reactions   • No Known Allergies          Current Outpatient Medications   Medication Sig Dispense Refill   • estradioL (ESTRACE) 0.01 % (0.1 mg/gram) vaginal cream Apply 2 g into the vagina daily at bedtime for 14 days. Then 1 g twice weekly thereafter 42.5 g 0   • hydrocortisone 2.5 % cream Apply topically 2 (two) times a day. 30 g 1   • alendronate 70 mg tablet Take 70 mg by mouth once a week.     • cholecalciferol, vitamin D3, 10 mcg/mL (400 unit/mL) oral drops Take 400 Units by mouth daily.     • gabapentin 300 mg capsule Take 1 capsule (300 mg total) by mouth nightly.     • LORazepam (ATIVAN) 1 mg tablet Take 0.5 tablets (0.5 mg total) by mouth once for 1 dose. Take 1 tablet 1 hour prior to MRI and repeat if needed 2 tablet 0   • methylPREDNISolone 4 mg tablet Take 3 tablets (12 mg total) by mouth daily. 12 mg at once- -Rheum     • multivitamin tablet Take by mouth daily.       No current facility-administered medications for this visit.          Review of Systems       Review of Systems   Gastrointestinal: Positive for abdominal pain and constipation.   Genitourinary: Positive for vaginal pain.        Physical Examination       Objective     Vitals:    07/18/22 1033   BP: (!) 104/56   Pulse: (!) 54   Resp: 15   SpO2: 100%       Pulse Readings from Last 5 Encounters:   07/18/22 (!) 54   03/21/22 61   03/17/22 60   01/04/22 66   07/30/21 61       Wt Readings from Last 5 Encounters:   07/18/22 42.8 kg (94 lb 6.4 oz)   03/17/22 44.9 kg (99 lb)   01/04/22 45.8 kg (101 lb)   07/30/21 47.4 kg (104 lb 6.4 oz)   04/23/21 45.8 kg (101 lb)       Ht Readings from Last 5  "Encounters:   07/18/22 1.638 m (5' 4.5\")   03/17/22 1.702 m (5' 7\")   01/04/22 1.702 m (5' 7\")   07/30/21 1.654 m (5' 5.1\")   04/23/21 1.702 m (5' 7\")       BP Readings from Last 5 Encounters:   07/18/22 (!) 104/56   03/21/22 (!) 106/58   03/17/22 (!) 98/58   01/04/22 100/66   07/30/21 (!) 80/60       BMI Readings from Last 4 Encounters:   07/18/22 15.95 kg/m²   03/17/22 15.51 kg/m²   01/04/22 15.82 kg/m²   07/30/21 17.32 kg/m²       Physical Exam  Constitutional:       Appearance: Normal appearance.   Eyes:      Conjunctiva/sclera: Conjunctivae normal.   Pulmonary:      Effort: Pulmonary effort is normal.   Abdominal:      Palpations: Abdomen is soft.      Comments: Hyperactive bowel sounds, mild diffuse tenderness   Genitourinary:     Comments: Atrophy of labia  Neurological:      Mental Status: She is alert.   Psychiatric:         Mood and Affect: Mood normal.         Behavior: Behavior normal.         Thought Content: Thought content normal.          Laboratory Results     Lab Results   Component Value Date    WBC 4.90 04/17/2021    WBC 8.45 04/16/2021    WBC 6.02 09/18/2020    HGB 13.1 04/17/2021    HGB 14.4 04/16/2021    HGB 13.0 09/18/2020    HCT 38.3 04/17/2021    HCT 40.3 04/16/2021    HCT 38.5 09/18/2020    MCV 94.8 04/17/2021    MCV 91.4 04/16/2021    MCV 99.7 (H) 09/18/2020     04/17/2021     04/16/2021     09/18/2020        Lab Results   Component Value Date    GLUCOSE 87 04/17/2021    GLUCOSE 91 04/17/2021    GLUCOSE 88 04/16/2021    CALCIUM 9.0 04/17/2021    CALCIUM 9.3 04/17/2021    CALCIUM 9.0 04/16/2021     (L) 04/17/2021     04/17/2021     (L) 04/16/2021    K 4.3 04/17/2021    K 4.4 04/17/2021    K 3.9 04/16/2021    CO2 26 04/17/2021    CO2 28 04/17/2021    CO2 25 04/16/2021     04/17/2021     04/17/2021     04/16/2021    BUN 15 04/17/2021    BUN 12 04/17/2021    BUN 15 04/16/2021    CREATININE 0.6 04/17/2021    CREATININE 0.5 (L) 04/17/2021 "    CREATININE 0.5 (L) 04/16/2021    ALKPHOS 70 04/16/2021    ALKPHOS 51 09/17/2017    ALKPHOS 67 03/16/2017    AST 38 04/16/2021    AST 31 09/17/2017    AST 32 03/16/2017    ALT 30 04/16/2021    ALT 26 09/17/2017    ALT 25 03/16/2017    BILITOT 0.6 04/16/2021    BILITOT 0.6 09/17/2017    BILITOT 0.4 03/16/2017    ALBUMIN 4.8 04/16/2021    ALBUMIN 4.5 09/17/2017    ALBUMIN 4.4 03/16/2017       Lab Results   Component Value Date    CHOL 191 04/17/2021    CHOL 209 (H) 09/18/2017    CHOL 193 03/16/2017     Lab Results   Component Value Date    HDL 71 04/17/2021    HDL 81 09/18/2017    HDL 76 03/16/2017     Lab Results   Component Value Date    LDLCALC 113 (H) 04/17/2021    LDLCALC 117 (H) 09/18/2017    LDLCALC 99 03/16/2017     Lab Results   Component Value Date    TRIG 37 04/17/2021    TRIG 54 09/18/2017    TRIG 91 03/16/2017       The 10-year ASCVD risk score (Alinejohn HU Jr., et al., 2013) is: 2.9%    Values used to calculate the score:      Age: 66 years      Sex: Female      Is Non- : No      Diabetic: No      Tobacco smoker: No      Systolic Blood Pressure: 104 mmHg      Is BP treated: No      HDL Cholesterol: 71 mg/dL      Total Cholesterol: 191 mg/dL    Lab Results   Component Value Date    TSH 4.56 04/16/2021    TSH 2.34 01/04/2021    TSH 1.69 09/17/2017     No results found for: FREET4      Lab Results   Component Value Date    HGBA1C 4.8 04/17/2021       No results found for: CREATUR  No results found for: MICROALBUR  No results found for: ALBCRET    No results found for: PSA    No results found for: HEPCAB    Lab Results   Component Value Date    MG 2.0 09/17/2017        Immunization History   Administered Date(s) Administered   • COVID-19 Unspecified Immunization 03/22/2021   • Influenza Vaccine Quadrivalent 3 Yr And Older 11/01/2016   • Influenza Vaccine Quadrivalent Preservative Free 6 Mons and Up 11/08/2019   • Influenza, Unspecified 11/01/2016, 11/15/2016   • Pneumococcal Conjugate  13-Valent 09/27/2021   • Sars-cov-2 (Covid-19) Vaccine, Fabio 04/07/2020   • Tdap 08/26/2015         Health Maintenance Topics with due status: Overdue       Topic Date Due    HIV Screening Never done    Cervical Cancer Screening Never done    Colonoscopy Never done    Zoster Vaccine Never done    COVID-19 Vaccine 07/22/2021     Health Maintenance Topics with due status: Not Due       Topic Last Completion Date    DTaP, Tdap, and Td Vaccines 08/26/2015    Influenza Vaccine 11/08/2019    Breast Cancer Screening 07/27/2021    Pneumococcal 09/27/2021    Pneumococcal (65 years and older) 09/27/2021     Health Maintenance Topics with due status: Aged Out       Topic Date Due    Meningococcal ACWY Aged Out    HIB Vaccines Aged Out    IPV Vaccines Aged Out    HPV Vaccines Aged Out        Assessment and Plan       Assessment/Plan     Problem List Items Addressed This Visit        Digestive    Slow transit constipation    Current Assessment & Plan     Today I spent time reviewing patient's FODMAPs diet with her.  I advised that she follow-up with her motility specialist to see if they recommend a nutritionist.  Patient continues to have abdominal pain and did not have much stool despite 2 large doses of MiraLAX.  Encouraged her to stay in touch with them given her risk of bowel obstruction.              Genitourinary    Vaginal atrophy - Primary    Current Assessment & Plan     Patient reports feeling raw feeling in her labia.  On exam there is atrophy.  -Discussed risk risk benefit of estradiol cream.  Patient would like to proceed.  -Estrace, 2 g in the vagina daily at bedtime for 2 weeks then 1 g twice weekly thereafter  -Advised he can take 2 months to notice a difference              Immune    Rheumatoid arthritis (CMS/HCC)          Return in about 4 weeks (around 8/15/2022) for chronic constipation, vaginal dryness.    No orders of the defined types were placed in this encounter.             Dinorah Tolentino,  MD    7/18/2022

## 2022-07-19 NOTE — ASSESSMENT & PLAN NOTE
Patient reports feeling raw feeling in her labia.  On exam there is atrophy.  -Discussed risk risk benefit of estradiol cream.  Patient would like to proceed.  -Estrace, 2 g in the vagina daily at bedtime for 2 weeks then 1 g twice weekly thereafter  -Advised he can take 2 months to notice a difference

## 2022-07-19 NOTE — ASSESSMENT & PLAN NOTE
Today I spent time reviewing patient's FODMAPs diet with her.  I advised that she follow-up with her motility specialist to see if they recommend a nutritionist.  Patient continues to have abdominal pain and did not have much stool despite 2 large doses of MiraLAX.  Encouraged her to stay in touch with them given her risk of bowel obstruction.

## 2022-08-19 ENCOUNTER — OFFICE VISIT (OUTPATIENT)
Dept: PRIMARY CARE | Facility: CLINIC | Age: 67
End: 2022-08-19
Payer: COMMERCIAL

## 2022-08-19 VITALS
SYSTOLIC BLOOD PRESSURE: 112 MMHG | RESPIRATION RATE: 17 BRPM | DIASTOLIC BLOOD PRESSURE: 54 MMHG | HEART RATE: 57 BPM | BODY MASS INDEX: 16.39 KG/M2 | WEIGHT: 98.4 LBS | HEIGHT: 65 IN | OXYGEN SATURATION: 99 %

## 2022-08-19 DIAGNOSIS — H53.8 BLURRY VISION, RIGHT EYE: ICD-10-CM

## 2022-08-19 DIAGNOSIS — K59.01 SLOW TRANSIT CONSTIPATION: Primary | ICD-10-CM

## 2022-08-19 DIAGNOSIS — N95.2 VAGINAL ATROPHY: ICD-10-CM

## 2022-08-19 DIAGNOSIS — E78.00 PURE HYPERCHOLESTEROLEMIA: ICD-10-CM

## 2022-08-19 PROCEDURE — 99213 OFFICE O/P EST LOW 20 MIN: CPT | Performed by: INTERNAL MEDICINE

## 2022-08-19 PROCEDURE — 3008F BODY MASS INDEX DOCD: CPT | Performed by: INTERNAL MEDICINE

## 2022-08-19 RX ORDER — PRUCALOPRIDE 2 MG/1
2 TABLET, FILM COATED ORAL
COMMUNITY
Start: 2022-08-08

## 2022-08-19 RX ORDER — HYDROCORTISONE 25 MG/G
CREAM TOPICAL 2 TIMES DAILY
Qty: 30 G | Refills: 1 | Status: SHIPPED | OUTPATIENT
Start: 2022-08-19 | End: 2023-11-28 | Stop reason: SDUPTHER

## 2022-08-19 RX ORDER — ASPIRIN 81 MG/1
81 TABLET ORAL DAILY
COMMUNITY

## 2022-08-19 ASSESSMENT — ENCOUNTER SYMPTOMS
CONSTIPATION: 1
SLEEP DISTURBANCE: 1
ABDOMINAL PAIN: 1

## 2022-08-19 NOTE — ASSESSMENT & PLAN NOTE
Last visit patient reported raw feeling in her labia.  On exam there was atrophy.  Started on estrace cream.  She feels it has been helpful already.  She will continue.  FU in 2 months.

## 2022-08-19 NOTE — ASSESSMENT & PLAN NOTE
Pt with history of TIA who reports 3-5 minutes of blurry vision in her right eye yesterday.  No weakness, paraesthesias, facial droop, slurring of words.  She has had some eye twitching since but otherwise complete resolution.  - Advised she call her neurologist to discuss this  - Pt stopped her baby aspirin this past spring on her own-advised to restart-she is in agreement  - Recheck lipid panel

## 2022-08-19 NOTE — ASSESSMENT & PLAN NOTE
"Patient has had multiple surgeries for rectal prolapse this past spring and has had chronic constipation and abdominal pain since that time which is distressing for her.  She has had workups with GI and is seeing a motility specialist  - No improvement with colonscopy doses of miralax  - Can trial lactulose-prescription sent last visit but pt has not tried yet  - Pt following up with motility specialist next week-can ask about a \"high enema\"  - Simethicone  - Nutritionist not helpful.  She has had some weight gain since lat visit.  - Discussed colon massage  - Discussed pelvic floor PT-pt reports surgeon said this is unlikely to be helpful.  "

## 2022-08-19 NOTE — PROGRESS NOTES
Dinorah Tolentino MD    [facility]  [address]  [place] [address]  Phone: [phone]  Fax: [phone]     History of Present Illness     Subjective     Patient ID: Nohemy Machuca is a 67 y.o. female.    Pt comes in to follow up on constipation, vaginal atrophy, right eye blurry vision.    Stomach pain whenever drinks or eats anything.  Seeing motility specialist, sees them next week then again in a month.  Has tried golytely and miralax.  Happened after third surgery for rectal prolapse.  Has some evacuation but not enough to empy out.  Didn't try lactulose.  Tried enemas but didn't work.  Stressful.  Has many notes on things she should or should not eat-overwhelming.    Started estrogen cream, it has been helping with vaginal burning.    Not sleeping well, partially due to all the regimens she is using for her constipation.    When had TIA in 2020, first sign was both eyes got blurry.  Yesterday right eye did same thing and got blurry for 3-5 mins, twitching since then in eye, no weakness, headache, tingling, numbness, facial droop.  Stopped aspirin 3 months ago because there were so many meds.  Neurologist Dr. Damon.  Seeing her in the next month or so.       Past Medical/Surgical/Family/Social History       The following have been reviewed and updated as appropriate in this visit:            Past Medical History:   Diagnosis Date   • Cerebral cavernoma    • History of tinnitus    • Osteoporosis        Past Surgical History:   Procedure Laterality Date   • BUNIONECTOMY Bilateral    • TONSILLECTOMY         Family History   Problem Relation Age of Onset   • Diverticulitis Biological Mother    • Glaucoma Biological Father        Social History     Tobacco Use   • Smoking status: Never Smoker   • Smokeless tobacco: Never Used   Substance Use Topics   • Alcohol use: Yes     Comment: events   • Drug use: Never       Patient Care Team:  Dinorah Tolentino MD  "as PCP - General (Internal Medicine)  Marva Damon MD as Consulting Physician (Neurology)     Allergies and Medications       Allergies   Allergen Reactions   • No Known Allergies        Current Outpatient Medications   Medication Sig Dispense Refill   • alendronate 70 mg tablet Take 70 mg by mouth once a week.     • aspirin 81 mg enteric coated tablet Take 81 mg by mouth daily.     • cholecalciferol, vitamin D3, 10 mcg/mL (400 unit/mL) oral drops Take 400 Units by mouth daily.     • estradioL (ESTRACE) 0.01 % (0.1 mg/gram) vaginal cream Apply 2 g into the vagina daily at bedtime for 14 days. Then 1 g twice weekly thereafter 42.5 g 0   • gabapentin 300 mg capsule Take 1 capsule (300 mg total) by mouth nightly.     • hydrocortisone 2.5 % cream Apply topically 2 (two) times a day. 30 g 1   • lactulose (CHRONULAC) 20 gram/30 mL solution Take 30 mL (20 g total) by mouth daily as needed (constipation). 210 mL 0   • MOTEGRITY 2 mg tablet Take 2 mg by mouth once daily.     • multivitamin tablet Take by mouth daily.       No current facility-administered medications for this visit.          Review of Systems       Review of Systems   Gastrointestinal: Positive for abdominal pain and constipation.   Psychiatric/Behavioral: Positive for sleep disturbance.        Physical Examination       Objective     Vitals:    08/19/22 0903   BP: (!) 112/54   Pulse: (!) 57   Resp: 17   SpO2: 99%       Pulse Readings from Last 5 Encounters:   08/19/22 (!) 57   07/18/22 (!) 54   03/21/22 61   03/17/22 60   01/04/22 66       Wt Readings from Last 5 Encounters:   08/19/22 44.6 kg (98 lb 6.4 oz)   07/18/22 42.8 kg (94 lb 6.4 oz)   03/17/22 44.9 kg (99 lb)   01/04/22 45.8 kg (101 lb)   07/30/21 47.4 kg (104 lb 6.4 oz)       Ht Readings from Last 5 Encounters:   08/19/22 1.638 m (5' 4.5\")   07/18/22 1.638 m (5' 4.5\")   03/17/22 1.702 m (5' 7\")   01/04/22 1.702 m (5' 7\")   07/30/21 1.654 m (5' 5.1\")       BP Readings from Last 5 Encounters: "   08/19/22 (!) 112/54   07/18/22 (!) 104/56   03/21/22 (!) 106/58   03/17/22 (!) 98/58   01/04/22 100/66       BMI Readings from Last 4 Encounters:   08/19/22 16.63 kg/m²   07/18/22 15.95 kg/m²   03/17/22 15.51 kg/m²   01/04/22 15.82 kg/m²       Physical Exam  Constitutional:       Appearance: Normal appearance.   Eyes:      Conjunctiva/sclera: Conjunctivae normal.   Pulmonary:      Effort: Pulmonary effort is normal.   Abdominal:      General: Bowel sounds are normal.      Palpations: Abdomen is soft.      Tenderness: There is abdominal tenderness.   Neurological:      Mental Status: She is alert.   Psychiatric:         Mood and Affect: Mood normal.         Behavior: Behavior normal.         Thought Content: Thought content normal.          Laboratory Results     Lab Results   Component Value Date    WBC 4.90 04/17/2021    WBC 8.45 04/16/2021    WBC 6.02 09/18/2020    HGB 13.1 04/17/2021    HGB 14.4 04/16/2021    HGB 13.0 09/18/2020    HCT 38.3 04/17/2021    HCT 40.3 04/16/2021    HCT 38.5 09/18/2020    MCV 94.8 04/17/2021    MCV 91.4 04/16/2021    MCV 99.7 (H) 09/18/2020     04/17/2021     04/16/2021     09/18/2020        Lab Results   Component Value Date    GLUCOSE 87 04/17/2021    GLUCOSE 91 04/17/2021    GLUCOSE 88 04/16/2021    CALCIUM 9.0 04/17/2021    CALCIUM 9.3 04/17/2021    CALCIUM 9.0 04/16/2021     (L) 04/17/2021     04/17/2021     (L) 04/16/2021    K 4.3 04/17/2021    K 4.4 04/17/2021    K 3.9 04/16/2021    CO2 26 04/17/2021    CO2 28 04/17/2021    CO2 25 04/16/2021     04/17/2021     04/17/2021     04/16/2021    BUN 15 04/17/2021    BUN 12 04/17/2021    BUN 15 04/16/2021    CREATININE 0.6 04/17/2021    CREATININE 0.5 (L) 04/17/2021    CREATININE 0.5 (L) 04/16/2021    ALKPHOS 70 04/16/2021    ALKPHOS 51 09/17/2017    ALKPHOS 67 03/16/2017    AST 38 04/16/2021    AST 31 09/17/2017    AST 32 03/16/2017    ALT 30 04/16/2021    ALT 26 09/17/2017     ALT 25 03/16/2017    BILITOT 0.6 04/16/2021    BILITOT 0.6 09/17/2017    BILITOT 0.4 03/16/2017    ALBUMIN 4.8 04/16/2021    ALBUMIN 4.5 09/17/2017    ALBUMIN 4.4 03/16/2017       Lab Results   Component Value Date    CHOL 191 04/17/2021    CHOL 209 (H) 09/18/2017    CHOL 193 03/16/2017     Lab Results   Component Value Date    HDL 71 04/17/2021    HDL 81 09/18/2017    HDL 76 03/16/2017     Lab Results   Component Value Date    LDLCALC 113 (H) 04/17/2021    LDLCALC 117 (H) 09/18/2017    LDLCALC 99 03/16/2017     Lab Results   Component Value Date    TRIG 37 04/17/2021    TRIG 54 09/18/2017    TRIG 91 03/16/2017       The 10-year ASCVD risk score (Aline HU Jr., et al., 2013) is: 3.8%    Values used to calculate the score:      Age: 67 years      Sex: Female      Is Non- : No      Diabetic: No      Tobacco smoker: No      Systolic Blood Pressure: 112 mmHg      Is BP treated: No      HDL Cholesterol: 71 mg/dL      Total Cholesterol: 191 mg/dL    Lab Results   Component Value Date    TSH 4.56 04/16/2021    TSH 2.34 01/04/2021    TSH 1.69 09/17/2017     No results found for: FREET4      Lab Results   Component Value Date    HGBA1C 4.8 04/17/2021       No results found for: CREATUR  No results found for: MICROALBUR  No results found for: ALBCRET    No results found for: HEPCAB    Lab Results   Component Value Date    MG 2.0 09/17/2017            Immunization History   Administered Date(s) Administered   • COVID-19 Unspecified Immunization 03/22/2021   • Influenza Vaccine Quadrivalent 3 Yr And Older 11/01/2016   • Influenza Vaccine Quadrivalent Preservative Free 6 Mons and Up 11/08/2019   • Influenza, Unspecified 11/01/2016, 11/15/2016   • Pneumococcal Conjugate 13-Valent 09/27/2021   • Sars-cov-2 (Covid-19) Vaccine, Fabio 04/07/2020   • Tdap 08/26/2015         Health Maintenance Topics with due status: Overdue       Topic Date Due    HIV Screening Never done    Cervical Cancer Screening Never done  "   Colonoscopy Never done    Zoster Vaccine Never done    COVID-19 Vaccine 07/22/2021    Influenza Vaccine 08/01/2022    Annual Falls Risk Screening Never done     Health Maintenance Topics with due status: Not Due       Topic Last Completion Date    DTaP, Tdap, and Td Vaccines 08/26/2015    Breast Cancer Screening 07/27/2021    Pneumococcal (65 years and older) 09/27/2021    DEXA Scan 10/27/2021     Health Maintenance Topics with due status: Aged Out       Topic Date Due    Meningococcal ACWY Aged Out    HIB Vaccines Aged Out    IPV Vaccines Aged Out    HPV Vaccines Aged Out        Assessment and Plan       Assessment/Plan     Problem List Items Addressed This Visit        Digestive    Slow transit constipation - Primary    Current Assessment & Plan     Patient has had multiple surgeries for rectal prolapse this past spring and has had chronic constipation and abdominal pain since that time which is distressing for her.  She has had workups with GI and is seeing a motility specialist  - No improvement with colonscopy doses of miralax  - Can trial lactulose-prescription sent last visit but pt has not tried yet  - Pt following up with motility specialist next week-can ask about a \"high enema\"  - Simethicone  - Nutritionist not helpful.  She has had some weight gain since lat visit.  - Discussed colon massage  - Discussed pelvic floor PT-pt reports surgeon said this is unlikely to be helpful.              Genitourinary    Vaginal atrophy    Current Assessment & Plan     Last visit patient reported raw feeling in her labia.  On exam there was atrophy.  Started on estrace cream.  She feels it has been helpful already.  She will continue.  FU in 2 months.              Endocrine/Metabolic    Pure hypercholesterolemia    Current Assessment & Plan     - Fasting lipid panel           Relevant Orders    Lipid panel       Other    Blurry vision, right eye    Current Assessment & Plan     Pt with history of TIA who reports 3-5 " minutes of blurry vision in her right eye yesterday.  No weakness, paraesthesias, facial droop, slurring of words.  She has had some eye twitching since but otherwise complete resolution.  - Advised she call her neurologist to discuss this  - Pt stopped her baby aspirin this past spring on her own-advised to restart-she is in agreement  - Recheck lipid panel                 Return in about 2 months (around 10/19/2022) for constipation.    Orders Placed This Encounter   Procedures   • Lipid panel     Standing Status:   Future     Number of Occurrences:   1     Standing Expiration Date:   8/19/2023     Order Specific Question:   Release to patient     Answer:   Immediate              Dinorah Tolentino MD    8/19/2022

## 2022-08-19 NOTE — PATIENT INSTRUCTIONS
Gas-Ex:  Oral: Usual dose: 40 to 125 mg 4 times daily as needed after meals and at bedtime; may administer single doses of up to 160 to 500 mg after meals or at bedtime, not to exceed a maximum daily dose of 500 mg/day

## 2022-09-23 LAB
CHOLEST SERPL-MCNC: 193 MG/DL (ref 100–199)
HDLC SERPL-MCNC: 98 MG/DL
LDLC SERPL CALC-MCNC: 86 MG/DL (ref 0–99)
TRIGL SERPL-MCNC: 44 MG/DL (ref 0–149)
VLDLC SERPL CALC-MCNC: 9 MG/DL (ref 5–40)

## 2022-11-30 RX ORDER — ESTRADIOL 0.1 MG/G
CREAM VAGINAL
Qty: 42.5 G | Refills: 0 | OUTPATIENT
Start: 2022-11-30

## 2022-12-02 ENCOUNTER — OFFICE VISIT (OUTPATIENT)
Dept: PRIMARY CARE | Facility: CLINIC | Age: 67
End: 2022-12-02
Payer: COMMERCIAL

## 2022-12-02 VITALS
BODY MASS INDEX: 15.76 KG/M2 | SYSTOLIC BLOOD PRESSURE: 109 MMHG | HEIGHT: 65 IN | RESPIRATION RATE: 16 BRPM | DIASTOLIC BLOOD PRESSURE: 65 MMHG | WEIGHT: 94.6 LBS

## 2022-12-02 DIAGNOSIS — R10.84 GENERALIZED ABDOMINAL PAIN: ICD-10-CM

## 2022-12-02 DIAGNOSIS — Z23 INFLUENZA VACCINE NEEDED: Primary | ICD-10-CM

## 2022-12-02 DIAGNOSIS — K59.01 SLOW TRANSIT CONSTIPATION: ICD-10-CM

## 2022-12-02 PROBLEM — M81.8 IDIOPATHIC OSTEOPOROSIS: Status: ACTIVE | Noted: 2019-11-08

## 2022-12-02 PROBLEM — M62.9 DISORDER OF MUSCLE: Status: ACTIVE | Noted: 2022-02-01

## 2022-12-02 PROBLEM — M19.91 PRIMARY LOCALIZED OSTEOARTHRITIS: Status: ACTIVE | Noted: 2022-02-01

## 2022-12-02 PROCEDURE — 90694 VACC AIIV4 NO PRSRV 0.5ML IM: CPT | Performed by: INTERNAL MEDICINE

## 2022-12-02 PROCEDURE — 3008F BODY MASS INDEX DOCD: CPT | Performed by: INTERNAL MEDICINE

## 2022-12-02 PROCEDURE — 90471 IMMUNIZATION ADMIN: CPT | Performed by: INTERNAL MEDICINE

## 2022-12-02 PROCEDURE — 99213 OFFICE O/P EST LOW 20 MIN: CPT | Mod: 25 | Performed by: INTERNAL MEDICINE

## 2022-12-02 RX ORDER — LINACLOTIDE 290 UG/1
CAPSULE, GELATIN COATED ORAL
COMMUNITY
Start: 2022-10-25 | End: 2023-01-05 | Stop reason: ALTCHOICE

## 2022-12-02 NOTE — PATIENT INSTRUCTIONS
For abdominal pain:  - Buspirone three times a day before meals has some evidence it can help with the stretch receptors

## 2022-12-02 NOTE — PROGRESS NOTES
"   Dinorah Tolentino MD    [facility]  [address]  [place] [address]  Phone: [phone]  Fax: [phone]     History of Present Illness     Subjective     Patient ID: Nohemy Machuca is a 67 y.o. female.    Patient comes in to discuss constipation and abdominal discomfort.    She continues to have constipation, bloating, pain after eating leading to weight loss  11/22 had manometry, does not yet have results    Had telemed conference with Dr. Greenwood, will be seeing surgeon next week.  He has recommended the following plan:  \"Motegrity 2 mg, take this 30 minutes before breakfast  Linzess 290 mcg 30 minutes before breakfast  Smooth Move Tea as first item of breakfast  Have a hearty breakfast that includes 10 to 12 grams of fiber  Are you feeling particularly uncomfortable? Go for Abdominal xray at [facility].  Colon Gentle Cleanse every three weeks x 2 then once a month.\"    10/24/22 started pelic floor PT which has been helpful.  Suggested a belt (belly bandit), said all her surgeries reduced stomach strength, helping with less gas and pain.  Has abdominal pain when she eats about 10 minutes later.  If drinks liquids has pain as well  Nutritionis has helped her to firm up stools a little  Tried stopping alendronate but it didn't help       Past Medical/Surgical/Family/Social History       The following have been reviewed and updated as appropriate in this visit:          Past Medical History:   Diagnosis Date   • Cerebral cavernoma    • History of tinnitus    • Osteoporosis        Past Surgical History:   Procedure Laterality Date   • BUNIONECTOMY Bilateral    • TONSILLECTOMY         Family History   Problem Relation Age of Onset   • Diverticulitis Biological Mother    • Glaucoma Biological Father        Social History     Tobacco Use   • Smoking status: Never   • Smokeless tobacco: Never   Substance Use Topics   • Alcohol use: Yes     Comment: events   • Drug " "use: Never       Patient Care Team:  Dinorah Tolentino MD as PCP - General (Internal Medicine)  Marva Damon MD as Consulting Physician (Neurology)     Allergies and Medications       Allergies   Allergen Reactions   • No Known Allergies        Current Outpatient Medications   Medication Sig Dispense Refill   • alendronate 70 mg tablet Take 70 mg by mouth once a week.     • aspirin 81 mg enteric coated tablet Take 81 mg by mouth daily.     • cholecalciferol, vitamin D3, 10 mcg/mL (400 unit/mL) oral drops Take 400 Units by mouth daily.     • estradioL (ESTRACE) 0.01 % (0.1 mg/gram) vaginal cream Apply 2 g into the vagina daily at bedtime for 14 days. Then 1 g twice weekly thereafter 42.5 g 0   • gabapentin 300 mg capsule Take 1 capsule (300 mg total) by mouth nightly.     • lactulose (CHRONULAC) 20 gram/30 mL solution Take 30 mL (20 g total) by mouth daily as needed (constipation). 210 mL 0   • LINZESS 290 mcg capsule      • MOTEGRITY 2 mg tablet Take 2 mg by mouth once daily.     • multivitamin tablet Take by mouth daily.     • hydrocortisone 2.5 % cream Apply topically 2 (two) times a day. 30 g 1     No current facility-administered medications for this visit.          Review of Systems       Review of Systems     Physical Examination       Objective     Vitals:    12/02/22 0908   BP: 109/65   Resp: 16       Pulse Readings from Last 5 Encounters:   08/19/22 (!) 57   07/18/22 (!) 54   03/21/22 61   03/17/22 60   01/04/22 66       Wt Readings from Last 5 Encounters:   12/02/22 42.9 kg (94 lb 9.6 oz)   08/19/22 44.6 kg (98 lb 6.4 oz)   07/18/22 42.8 kg (94 lb 6.4 oz)   03/17/22 44.9 kg (99 lb)   01/04/22 45.8 kg (101 lb)       Ht Readings from Last 5 Encounters:   12/02/22 1.638 m (5' 4.5\")   08/19/22 1.638 m (5' 4.5\")   07/18/22 1.638 m (5' 4.5\")   03/17/22 1.702 m (5' 7\")   01/04/22 1.702 m (5' 7\")       BP Readings from Last 5 Encounters:   12/02/22 109/65   08/19/22 (!) 112/54   07/18/22 (!) 104/56 "   03/21/22 (!) 106/58   03/17/22 (!) 98/58       BMI Readings from Last 4 Encounters:   12/02/22 15.99 kg/m²   08/19/22 16.63 kg/m²   07/18/22 15.95 kg/m²   03/17/22 15.51 kg/m²       Physical Exam  Constitutional:       Appearance: Normal appearance.   Eyes:      Conjunctiva/sclera: Conjunctivae normal.   Pulmonary:      Effort: Pulmonary effort is normal.   Abdominal:      Palpations: Abdomen is soft.      Tenderness: There is no abdominal tenderness.   Neurological:      Mental Status: She is alert.   Psychiatric:         Mood and Affect: Mood normal.         Behavior: Behavior normal.         Thought Content: Thought content normal.          Laboratory Results     Lab Results   Component Value Date    WBC 4.90 04/17/2021    WBC 8.45 04/16/2021    WBC 6.02 09/18/2020    HGB 13.1 04/17/2021    HGB 14.4 04/16/2021    HGB 13.0 09/18/2020    HCT 38.3 04/17/2021    HCT 40.3 04/16/2021    HCT 38.5 09/18/2020    MCV 94.8 04/17/2021    MCV 91.4 04/16/2021    MCV 99.7 (H) 09/18/2020     04/17/2021     04/16/2021     09/18/2020        Lab Results   Component Value Date    GLUCOSE 87 04/17/2021    GLUCOSE 91 04/17/2021    GLUCOSE 88 04/16/2021    CALCIUM 9.0 04/17/2021    CALCIUM 9.3 04/17/2021    CALCIUM 9.0 04/16/2021     (L) 04/17/2021     04/17/2021     (L) 04/16/2021    K 4.3 04/17/2021    K 4.4 04/17/2021    K 3.9 04/16/2021    CO2 26 04/17/2021    CO2 28 04/17/2021    CO2 25 04/16/2021     04/17/2021     04/17/2021     04/16/2021    BUN 15 04/17/2021    BUN 12 04/17/2021    BUN 15 04/16/2021    CREATININE 0.6 04/17/2021    CREATININE 0.5 (L) 04/17/2021    CREATININE 0.5 (L) 04/16/2021    ALKPHOS 70 04/16/2021    ALKPHOS 51 09/17/2017    ALKPHOS 67 03/16/2017    AST 38 04/16/2021    AST 31 09/17/2017    AST 32 03/16/2017    ALT 30 04/16/2021    ALT 26 09/17/2017    ALT 25 03/16/2017    BILITOT 0.6 04/16/2021    BILITOT 0.6 09/17/2017    BILITOT 0.4 03/16/2017     ALBUMIN 4.8 04/16/2021    ALBUMIN 4.5 09/17/2017    ALBUMIN 4.4 03/16/2017       Lab Results   Component Value Date    CHOL 193 09/22/2022    CHOL 191 04/17/2021    CHOL 209 (H) 09/18/2017     Lab Results   Component Value Date    HDL 98 09/22/2022    HDL 71 04/17/2021    HDL 81 09/18/2017     Lab Results   Component Value Date    LDLCALC 86 09/22/2022    LDLCALC 113 (H) 04/17/2021    LDLCALC 117 (H) 09/18/2017     Lab Results   Component Value Date    TRIG 44 09/22/2022    TRIG 37 04/17/2021    TRIG 54 09/18/2017       The 10-year ASCVD risk score (Renee DK, et al., 2019) is: 3.1%    Values used to calculate the score:      Age: 67 years      Sex: Female      Is Non- : No      Diabetic: No      Tobacco smoker: No      Systolic Blood Pressure: 109 mmHg      Is BP treated: No      HDL Cholesterol: 98 mg/dL      Total Cholesterol: 193 mg/dL    Lab Results   Component Value Date    TSH 4.56 04/16/2021    TSH 2.34 01/04/2021    TSH 1.69 09/17/2017     No results found for: FREET4      Lab Results   Component Value Date    HGBA1C 4.8 04/17/2021       No results found for: CREATUR  No results found for: MICROALBUR  No results found for: ALBCRET    No results found for: HEPCAB    Lab Results   Component Value Date    MG 2.0 09/17/2017            Immunization History   Administered Date(s) Administered   • INFLUENZA VACCINE QUAD ADJUVANTED 65 and OLDER 12/02/2022   • Influenza Vaccine Quadrivalent 3 Yr And Older 11/01/2016   • Influenza Vaccine Quadrivalent Preservative Free 6 Mons and Up 11/08/2019   • Influenza, Unspecified 11/01/2016, 11/15/2016   • Pneumococcal Conjugate 13-Valent 09/27/2021   • Pneumococcal Polysaccharide 10/02/2022   • SARS-COV-2 (COVID-19) Unspecified Immunization 03/22/2021   • Sars-cov-2 (Covid-19) Vaccine, Fabio 04/07/2020   • Tdap 08/26/2015         Health Maintenance Topics with due status: Overdue       Topic Date Due    Colorectal Cancer Screening Never done     Depression Screening Never done    HIV Screening Never done    Cervical Cancer Screening Never done    Zoster Vaccine Never done    COVID-19 Vaccine 05/17/2021    Annual Falls Risk Screening Never done     Health Maintenance Topics with due status: Not Due       Topic Last Completion Date    DTaP, Tdap, and Td Vaccines 08/26/2015    Breast Cancer Screening 07/27/2021    DEXA Scan 10/27/2021     Health Maintenance Topics with due status: Completed       Topic Last Completion Date    Pneumococcal (65 years and older) 10/02/2022    Influenza Vaccine 12/02/2022     Health Maintenance Topics with due status: Aged Out       Topic Date Due    Meningococcal ACWY Aged Out    HIB Vaccines Aged Out    IPV Vaccines Aged Out    HPV Vaccines Aged Out        Assessment and Plan       Assessment/Plan     Problem List Items Addressed This Visit        Nervous    Generalized abdominal pain    Current Assessment & Plan     Patient has severe pain after eating-avoids doing so when she can.  Weight is low.  She is working with a nutritionist, pelvic floor PT, motility specialist and gastroenterologist.  - Today we discussed a few options.  We discussed a TCA-pt reports she was on this in the past for another condition and it resulted in an episode of syncope.  Discussed some evidence for buspirone before meals.  - Pt would like to trial a probiotic first which she has not yet-I recommended Align  - Continue with pelvic floor PT  - Advised following up in 6 weeks-will consider further whether to trial low dose buspirone at that time            Digestive    Slow transit constipation    Current Assessment & Plan     Patient has had multiple surgeries for rectal prolapse this past spring and has had chronic constipation and abdominal pain since that time which is distressing for her.  She has had workups with GI and is seeing a motility specialist  - Continue with pelvic floor PT  - Ok to use simethicone PRN  - Continue working with  nutritionist  - Advised pt to follow instruction of motility specialist-continues on Linzess and Motegrity, advised to do colon cleanse every 3 weeks for two weeks then monthly  - Try Align Probiotic  - FU with me in 6 weeks        Other Visit Diagnoses     Influenza vaccine needed    -  Primary    Relevant Orders    Influenza vaccine Quad Adjuvanted 65 and Older (FluAd Quad) (Completed)          Return in about 6 weeks (around 1/13/2023) for abdominal pain.    Orders Placed This Encounter   Procedures   • Influenza vaccine Quad Adjuvanted 65 and Older (FluAd Quad)              Dinorah Tolentino MD    12/4/2022

## 2022-12-04 PROBLEM — R10.84 GENERALIZED ABDOMINAL PAIN: Status: ACTIVE | Noted: 2022-12-04

## 2022-12-04 NOTE — ASSESSMENT & PLAN NOTE
Patient has had multiple surgeries for rectal prolapse this past spring and has had chronic constipation and abdominal pain since that time which is distressing for her.  She has had workups with GI and is seeing a motility specialist  - Continue with pelvic floor PT  - Ok to use simethicone PRN  - Continue working with nutritionist  - Advised pt to follow instruction of motility specialist-continues on Linzess and Motegrity, advised to do colon cleanse every 3 weeks for two weeks then monthly  - Try Align Probiotic  - FU with me in 6 weeks

## 2022-12-04 NOTE — ASSESSMENT & PLAN NOTE
Patient has severe pain after eating-avoids doing so when she can.  Weight is low.  She is working with a nutritionist, pelvic floor PT, motility specialist and gastroenterologist.  - Today we discussed a few options.  We discussed a TCA-pt reports she was on this in the past for another condition and it resulted in an episode of syncope.  Discussed some evidence for buspirone before meals.  - Pt would like to trial a probiotic first which she has not yet-I recommended Align  - Continue with pelvic floor PT  - Advised following up in 6 weeks-will consider further whether to trial low dose buspirone at that time

## 2023-01-05 ENCOUNTER — OFFICE VISIT (OUTPATIENT)
Dept: PRIMARY CARE | Facility: CLINIC | Age: 68
End: 2023-01-05
Payer: COMMERCIAL

## 2023-01-05 VITALS
DIASTOLIC BLOOD PRESSURE: 62 MMHG | SYSTOLIC BLOOD PRESSURE: 105 MMHG | BODY MASS INDEX: 16.26 KG/M2 | HEIGHT: 65 IN | WEIGHT: 97.6 LBS

## 2023-01-05 DIAGNOSIS — Q28.3 CEREBRAL CAVERNOMA: ICD-10-CM

## 2023-01-05 DIAGNOSIS — Z00.00 ENCOUNTER FOR GENERAL ADULT MEDICAL EXAMINATION WITHOUT ABNORMAL FINDINGS: Primary | ICD-10-CM

## 2023-01-05 DIAGNOSIS — Z12.31 ENCOUNTER FOR SCREENING MAMMOGRAM FOR MALIGNANT NEOPLASM OF BREAST: ICD-10-CM

## 2023-01-05 DIAGNOSIS — M06.9 RHEUMATOID ARTHRITIS, INVOLVING UNSPECIFIED SITE, UNSPECIFIED WHETHER RHEUMATOID FACTOR PRESENT (CMS/HCC): ICD-10-CM

## 2023-01-05 DIAGNOSIS — B18.2 CHRONIC HEPATITIS C WITHOUT HEPATIC COMA (CMS/HCC): ICD-10-CM

## 2023-01-05 DIAGNOSIS — D41.4 BLADDER POLYP: ICD-10-CM

## 2023-01-05 PROCEDURE — 3008F BODY MASS INDEX DOCD: CPT | Performed by: INTERNAL MEDICINE

## 2023-01-05 PROCEDURE — 99397 PER PM REEVAL EST PAT 65+ YR: CPT | Performed by: INTERNAL MEDICINE

## 2023-01-05 RX ORDER — ESTRADIOL 0.1 MG/G
CREAM VAGINAL
Qty: 42.5 G | Refills: 0 | Status: SHIPPED | OUTPATIENT
Start: 2023-01-05 | End: 2023-01-10 | Stop reason: SDUPTHER

## 2023-01-05 RX ORDER — POLYETHYLENE GLYCOL 3350 17 G/17G
17 POWDER, FOR SOLUTION ORAL DAILY
COMMUNITY
Start: 2022-04-28 | End: 2023-08-17

## 2023-01-05 RX ORDER — GABAPENTIN 100 MG/1
100 CAPSULE ORAL 2 TIMES DAILY
COMMUNITY
End: 2023-08-17

## 2023-01-05 RX ORDER — CALCIUM CARB/VITAMIN D3/VIT K1 500-500-40
TABLET,CHEWABLE ORAL
COMMUNITY

## 2023-01-05 NOTE — PROGRESS NOTES
Dinorah Tolentino MD    [facility] - [facility] Medicine  [address], [address]  [place] [address]  Phone: [phone]  Fax: [phone]     History of Present Illness     Subjective     Patient ID: Nohemy Machuca is a 67 y.o. female.    Pt presents for a Physical.  - Exercise: 40 minutes of walking per day  - Diet: varied, gets vegetables, protein, though some limitations as she has tried different diets for her GI symptoms  - Alcohol use: none  - Smoking: none  - Drugs: none  - Mental health: stress due to health but mood is overall good  - No falls in the last year  - Dental Provider: 2x per year  - Opth provider: New contacts, [facility], Dr. Mccormack as well  - Skin/Derm:  discussed ABCDEs of mole surveillance and sunscreen > 30 SPF.  Skin checks regularly.  Was seeing Dr. Gibbons, might switch  - Gyn/mmgm/pap: Normal paps in the past, last one was in 2017, Mammo 9/2021, new order place  - dexa (women aged 65 years and older and in younger women whose fracture risk is equal to or greater than that of a 65-year old): 10/2021 on fosamax started in 2022, from rheumatologist.  Michelle Saldana  - Last [place]:  3/2022, due in 10 years  - The USPSTF recommends screening for hepatitis C virus (HCV) infection in adults aged 18 to 79 years:  Treated with antivirals in 2021 with SVR  - Tdap: 2015  - Pneumonia: UTD  - Shingles: advised to get at pharmacy  - COVID Vaccine: J&J x 1, had TIA after, does not want further boosters, had COVID 1/2022  - Flu shot: UTD  - Adv Directives: did not discuss today  - Cholesterol and glucose: 9/2022 no concerns    1/12/23 nutritionist  Usng probiotics  Pain at night at the top of her abdomen after eating, still not emptying out all the way, taking miralax at 3pm and 6pm       Past Medical/Surgical/Family/Social History       The following have been reviewed and updated as appropriate in this visit:          Past Medical History:   Diagnosis Date   • Cerebral  cavernoma    • History of tinnitus    • Osteoporosis        Past Surgical History:   Procedure Laterality Date   • BUNIONECTOMY Bilateral    • TONSILLECTOMY         Family History   Problem Relation Age of Onset   • Diverticulitis Biological Mother    • Glaucoma Biological Father        Social History     Tobacco Use   • Smoking status: Never   • Smokeless tobacco: Never   Substance Use Topics   • Alcohol use: Yes     Comment: events   • Drug use: Never       Patient Care Team:  Dinorah Tolentino MD as PCP - General (Internal Medicine)  Marva Damon MD as Consulting Physician (Neurology)     Allergies and Medications       Allergies   Allergen Reactions   • No Known Allergies        Current Outpatient Medications   Medication Sig Dispense Refill   • alendronate 70 mg tablet Take 70 mg by mouth once a week.     • aspirin 81 mg enteric coated tablet Take 81 mg by mouth daily.     • cholecalciferol, vitamin D3, (VITAMIN D3) 10 mcg (400 unit) capsule Take by mouth.     • gabapentin (NEURONTIN) 100 mg capsule Take 100 mg by mouth 2 (two) times a day.     • MOTEGRITY 2 mg tablet Take 2 mg by mouth once daily.     • multivitamin tablet Take by mouth daily.     • polyethylene glycol (MIRALAX) 17 gram/dose powder Take 17 g by mouth daily.     • estradioL (ESTRACE) 0.01 % (0.1 mg/gram) vaginal cream Apply 2 g into the vagina daily at bedtime for 14 days. Then 1 g twice weekly thereafter 42.5 g 0   • hydrocortisone 2.5 % cream Apply topically 2 (two) times a day. 30 g 1     No current facility-administered medications for this visit.          Review of Systems       Review of Systems   Constitutional: Negative for fever.   HENT: Negative for hearing loss.    Eyes: Negative for visual disturbance.   Respiratory: Negative for chest tightness and shortness of breath.    Cardiovascular: Negative for chest pain and palpitations.   Gastrointestinal: Positive for abdominal pain and constipation.   Genitourinary: Negative for  "frequency.   Skin: Negative for rash.   Neurological: Negative for headaches.   Hematological: Negative for adenopathy.   Psychiatric/Behavioral: Negative for dysphoric mood. The patient is not nervous/anxious.         Physical Examination       Objective     Vitals:    01/05/23 1003   BP: 105/62       Pulse Readings from Last 5 Encounters:   08/19/22 (!) 57   07/18/22 (!) 54   03/21/22 61   03/17/22 60   01/04/22 66       Wt Readings from Last 5 Encounters:   01/05/23 44.3 kg (97 lb 9.6 oz)   12/02/22 42.9 kg (94 lb 9.6 oz)   08/19/22 44.6 kg (98 lb 6.4 oz)   07/18/22 42.8 kg (94 lb 6.4 oz)   03/17/22 44.9 kg (99 lb)       Ht Readings from Last 5 Encounters:   01/05/23 1.638 m (5' 4.5\")   12/02/22 1.638 m (5' 4.5\")   08/19/22 1.638 m (5' 4.5\")   07/18/22 1.638 m (5' 4.5\")   03/17/22 1.702 m (5' 7\")       BP Readings from Last 5 Encounters:   01/05/23 105/62   12/02/22 109/65   08/19/22 (!) 112/54   07/18/22 (!) 104/56   03/21/22 (!) 106/58       BMI Readings from Last 4 Encounters:   01/05/23 16.49 kg/m²   12/02/22 15.99 kg/m²   08/19/22 16.63 kg/m²   07/18/22 15.95 kg/m²       Physical Exam  Constitutional:       Appearance: Normal appearance.   HENT:      Right Ear: Tympanic membrane, ear canal and external ear normal.      Left Ear: Tympanic membrane, ear canal and external ear normal.      Nose: Nose normal.      Mouth/Throat:      Mouth: Mucous membranes are moist.   Eyes:      Conjunctiva/sclera: Conjunctivae normal.   Neck:      Thyroid: No thyroid mass or thyromegaly.   Cardiovascular:      Rate and Rhythm: Normal rate and regular rhythm.      Heart sounds: Normal heart sounds.   Pulmonary:      Effort: Pulmonary effort is normal.      Breath sounds: Normal breath sounds.   Abdominal:      General: Bowel sounds are normal.      Palpations: Abdomen is soft.      Tenderness: There is no abdominal tenderness.   Musculoskeletal:      Cervical back: Normal range of motion.   Skin:     Findings: No rash. "   Neurological:      Mental Status: She is alert.   Psychiatric:         Behavior: Behavior normal.         Thought Content: Thought content normal.          Laboratory Results     Lab Results   Component Value Date    WBC 4.90 04/17/2021    WBC 8.45 04/16/2021    WBC 6.02 09/18/2020    HGB 13.1 04/17/2021    HGB 14.4 04/16/2021    HGB 13.0 09/18/2020    HCT 38.3 04/17/2021    HCT 40.3 04/16/2021    HCT 38.5 09/18/2020    MCV 94.8 04/17/2021    MCV 91.4 04/16/2021    MCV 99.7 (H) 09/18/2020     04/17/2021     04/16/2021     09/18/2020        Lab Results   Component Value Date    GLUCOSE 87 04/17/2021    GLUCOSE 91 04/17/2021    GLUCOSE 88 04/16/2021    CALCIUM 9.0 04/17/2021    CALCIUM 9.3 04/17/2021    CALCIUM 9.0 04/16/2021     (L) 04/17/2021     04/17/2021     (L) 04/16/2021    K 4.3 04/17/2021    K 4.4 04/17/2021    K 3.9 04/16/2021    CO2 26 04/17/2021    CO2 28 04/17/2021    CO2 25 04/16/2021     04/17/2021     04/17/2021     04/16/2021    BUN 15 04/17/2021    BUN 12 04/17/2021    BUN 15 04/16/2021    CREATININE 0.6 04/17/2021    CREATININE 0.5 (L) 04/17/2021    CREATININE 0.5 (L) 04/16/2021    ALKPHOS 70 04/16/2021    ALKPHOS 51 09/17/2017    ALKPHOS 67 03/16/2017    AST 38 04/16/2021    AST 31 09/17/2017    AST 32 03/16/2017    ALT 30 04/16/2021    ALT 26 09/17/2017    ALT 25 03/16/2017    BILITOT 0.6 04/16/2021    BILITOT 0.6 09/17/2017    BILITOT 0.4 03/16/2017    ALBUMIN 4.8 04/16/2021    ALBUMIN 4.5 09/17/2017    ALBUMIN 4.4 03/16/2017       Lab Results   Component Value Date    CHOL 193 09/22/2022    CHOL 191 04/17/2021    CHOL 209 (H) 09/18/2017     Lab Results   Component Value Date    HDL 98 09/22/2022    HDL 71 04/17/2021    HDL 81 09/18/2017     Lab Results   Component Value Date    LDLCALC 86 09/22/2022    LDLCALC 113 (H) 04/17/2021    LDLCALC 117 (H) 09/18/2017     Lab Results   Component Value Date    TRIG 44 09/22/2022    TRIG 37  04/17/2021    TRIG 54 09/18/2017       The 10-year ASCVD risk score (Renee BUNN, et al., 2019) is: 2.9%    Values used to calculate the score:      Age: 67 years      Sex: Female      Is Non- : No      Diabetic: No      Tobacco smoker: No      Systolic Blood Pressure: 105 mmHg      Is BP treated: No      HDL Cholesterol: 98 mg/dL      Total Cholesterol: 193 mg/dL    Lab Results   Component Value Date    TSH 4.56 04/16/2021    TSH 2.34 01/04/2021    TSH 1.69 09/17/2017     No results found for: FREET4      Lab Results   Component Value Date    HGBA1C 4.8 04/17/2021       No results found for: CREATUR  No results found for: MICROALBUR  No results found for: ALBCRET    No results found for: HEPCAB    Lab Results   Component Value Date    MG 2.0 09/17/2017            Immunization History   Administered Date(s) Administered   • INFLUENZA VACCINE QUAD ADJUVANTED 65 and OLDER 12/02/2022   • Influenza Vaccine Quadrivalent 3 Yr And Older 11/01/2016   • Influenza Vaccine Quadrivalent Preservative Free 6 Mons and Up 11/08/2019   • Influenza, Unspecified 11/01/2016, 11/15/2016   • Pneumococcal Conjugate 13-Valent 09/27/2021   • Pneumococcal Polysaccharide 10/02/2022   • SARS-COV-2 (COVID-19) Unspecified Immunization 03/22/2021   • Sars-cov-2 (Covid-19) Vaccine, Fabio 04/07/2020   • Tdap 08/26/2015         Health Maintenance Topics with due status: Overdue       Topic Date Due    Colorectal Cancer Screening Never done    Depression Screening Never done    HIV Screening Never done    Cervical Cancer Screening Never done    Zoster Vaccine Never done    Hepatitis B Vaccines Never done    COVID-19 Vaccine 05/17/2021    Falls Risk Screening Never done     Health Maintenance Topics with due status: Not Due       Topic Last Completion Date    DTaP, Tdap, and Td Vaccines 08/26/2015    Breast Cancer Screening 07/27/2021    DEXA Scan 10/27/2021     Health Maintenance Topics with due status: Completed       Topic  Last Completion Date    Pneumococcal (65 years and older) 10/02/2022    Influenza Vaccine 12/02/2022     Health Maintenance Topics with due status: Aged Out       Topic Date Due    Meningococcal ACWY Aged Out    HIB Vaccines Aged Out    IPV Vaccines Aged Out    HPV Vaccines Aged Out        Assessment and Plan       Assessment/Plan     Problem List Items Addressed This Visit        Circulatory    Cerebral cavernoma (Chronic)       Digestive    Chronic hepatitis C without hepatic coma (CMS/HCC)       Genitourinary    Bladder polyp    Current Assessment & Plan     Seen on CT done with GI ([facility]  I have adviser Urology follow up for evaluation.         Relevant Orders    Ambulatory referral to Urology       Rheumatologic    Rheumatoid arthritis (CMS/HCC)       Other    Encounter for general adult medical examination without abnormal findings - Primary    Current Assessment & Plan     - Exercise: 40 minutes of walking per day  - Diet: varied, gets vegetables, protein, though some limitations as she has tried different diets for her GI symptoms  - Alcohol use: none  - Smoking: none  - Drugs: none  - Mental health: stress due to health but mood is overall good  - No falls in the last year  - Dental Provider: 2x per year  - Opth provider: New contacts, [facility], Dr. Mccormack as well  - Skin/Derm:  discussed ABCDEs of mole surveillance and sunscreen > 30 SPF.  Skin checks regularly.  Was seeing Dr. Gibbons, might switch  - Gyn/mmgm/pap: Normal paps in the past, last one was in 2017, Mammo 9/2021, new order place  - dexa (women aged 65 years and older and in younger women whose fracture risk is equal to or greater than that of a 65-year old): 10/2021 on fosamax started in 2022, from rheumatologist.  Michelle Saldana  - Last [place]:  3/2022, due in 10 years  - The USPSTF recommends screening for hepatitis C virus (HCV) infection in adults aged 18 to 79 years:  Treated with antivirals in 2021 with SVR  - Tdap:  2015  - Pneumonia: UTD  - Shingles: advised to get at pharmacy  - COVID Vaccine: J&J x 1, had TIA after, does not want further boosters, had COVID 1/2022  - Flu shot: UTD  - Adv Directives: did not discuss today  - Cholesterol and glucose: 9/2022 no concerns        Other Visit Diagnoses     Encounter for screening mammogram for malignant neoplasm of breast        Relevant Orders    BI SCREENING MAMMOGRAM BILATERAL(TOMOSYNTHESIS)          Return in about 2 months (around 3/5/2023) for next available is fine for abdominal pain (6-8 weeks).    Orders Placed This Encounter   Procedures   • BI SCREENING MAMMOGRAM BILATERAL(TOMOSYNTHESIS)     Standing Status:   Future     Standing Expiration Date:   3/5/2024     Order Specific Question:   Release to patient     Answer:   Immediate   • Ambulatory referral to Urology     Standing Status:   Future     Standing Expiration Date:   7/5/2023     Referral Priority:   Routine     Referral Type:   Consultation     Referral Reason:   Specialty Services Required     Referred to Provider:   Sunny Guardado MD     Number of Visits Requested:   10              Dinorah Tolentino MD    1/7/2023

## 2023-01-05 NOTE — TELEPHONE ENCOUNTER
Pt was just seen and forgot to ask the doctor to refill estradiol and  Lidocaine  Send to [facility] pharmacy

## 2023-01-07 PROBLEM — D41.4 BLADDER POLYP: Status: ACTIVE | Noted: 2023-01-07

## 2023-01-07 PROBLEM — Z00.00 ENCOUNTER FOR GENERAL ADULT MEDICAL EXAMINATION WITHOUT ABNORMAL FINDINGS: Status: ACTIVE | Noted: 2023-01-07

## 2023-01-07 ASSESSMENT — ENCOUNTER SYMPTOMS
ABDOMINAL PAIN: 1
FREQUENCY: 0
FEVER: 0
HEADACHES: 0
PALPITATIONS: 0
DYSPHORIC MOOD: 0
CONSTIPATION: 1
NERVOUS/ANXIOUS: 0
CHEST TIGHTNESS: 0
SHORTNESS OF BREATH: 0
ADENOPATHY: 0

## 2023-01-07 NOTE — ASSESSMENT & PLAN NOTE
- Exercise: 40 minutes of walking per day  - Diet: varied, gets vegetables, protein, though some limitations as she has tried different diets for her GI symptoms  - Alcohol use: none  - Smoking: none  - Drugs: none  - Mental health: stress due to health but mood is overall good  - No falls in the last year  - Dental Provider: 2x per year  - Opth provider: New contacts, [facility], Dr. Mccormack as well  - Skin/Derm:  discussed ABCDEs of mole surveillance and sunscreen > 30 SPF.  Skin checks regularly.  Was seeing Dr. Gibbons, might switch  - Gyn/mmgm/pap: Normal paps in the past, last one was in 2017, Mammo 9/2021, new order place  - dexa (women aged 65 years and older and in younger women whose fracture risk is equal to or greater than that of a 65-year old): 10/2021 on fosamax started in 2022, from rheumatologist.  Michelle Saldana  - Last [place]:  3/2022, due in 10 years  - The USPSTF recommends screening for hepatitis C virus (HCV) infection in adults aged 18 to 79 years:  Treated with antivirals in 2021 with SVR  - Tdap: 2015  - Pneumonia: UTD  - Shingles: advised to get at pharmacy  - COVID Vaccine: J&J x 1, had TIA after, does not want further boosters, had COVID 1/2022  - Flu shot: UTD  - Adv Directives: did not discuss today  - Cholesterol and glucose: 9/2022 no concerns

## 2023-01-11 RX ORDER — LIDOCAINE 50 MG/G
1 PATCH TOPICAL DAILY
Qty: 30 PATCH | Refills: 1 | Status: SHIPPED | OUTPATIENT
Start: 2023-01-11 | End: 2023-01-12 | Stop reason: SDUPTHER

## 2023-01-11 NOTE — TELEPHONE ENCOUNTER
Pt called to request this RX to be sent to [facility] instead of [facility]. RX sent to referred pharmacy.  
yes

## 2023-01-12 ENCOUNTER — TELEPHONE (OUTPATIENT)
Dept: PRIMARY CARE | Facility: CLINIC | Age: 68
End: 2023-01-12
Payer: COMMERCIAL

## 2023-01-12 RX ORDER — LIDOCAINE 50 MG/G
1 PATCH TOPICAL DAILY
Qty: 30 PATCH | Refills: 1 | Status: SHIPPED | OUTPATIENT
Start: 2023-01-12 | End: 2023-11-03 | Stop reason: SDUPTHER

## 2023-01-12 NOTE — TELEPHONE ENCOUNTER
Patient and said her insurance has changed her pharmacy and she needs her lidocaine RX to go to [facility].

## 2023-02-02 ENCOUNTER — CONSULT (OUTPATIENT)
Dept: PRIMARY CARE | Facility: CLINIC | Age: 68
End: 2023-02-02
Payer: COMMERCIAL

## 2023-02-02 VITALS
OXYGEN SATURATION: 97 % | SYSTOLIC BLOOD PRESSURE: 102 MMHG | BODY MASS INDEX: 16.13 KG/M2 | TEMPERATURE: 97.4 F | WEIGHT: 96.8 LBS | DIASTOLIC BLOOD PRESSURE: 60 MMHG | HEART RATE: 57 BPM | RESPIRATION RATE: 16 BRPM | HEIGHT: 65 IN

## 2023-02-02 DIAGNOSIS — G50.0 TRIGEMINAL NEURALGIA: Chronic | ICD-10-CM

## 2023-02-02 DIAGNOSIS — Z01.818 PREOPERATIVE CLEARANCE: Primary | ICD-10-CM

## 2023-02-02 DIAGNOSIS — D41.4 BLADDER POLYP: ICD-10-CM

## 2023-02-02 DIAGNOSIS — G45.9 TIA (TRANSIENT ISCHEMIC ATTACK): ICD-10-CM

## 2023-02-02 PROCEDURE — 99214 OFFICE O/P EST MOD 30 MIN: CPT | Performed by: NURSE PRACTITIONER

## 2023-02-02 PROCEDURE — 3008F BODY MASS INDEX DOCD: CPT | Performed by: NURSE PRACTITIONER

## 2023-02-02 ASSESSMENT — ENCOUNTER SYMPTOMS
DIZZINESS: 0
VOMITING: 0
RHINORRHEA: 0
TROUBLE SWALLOWING: 0
BRUISES/BLEEDS EASILY: 0
COUGH: 0
FEVER: 0
HEADACHES: 0
SORE THROAT: 0
DYSURIA: 0
FREQUENCY: 0
PALPITATIONS: 0
DIARRHEA: 0
HEMATURIA: 0
NAUSEA: 0
CHILLS: 0
SHORTNESS OF BREATH: 0

## 2023-02-02 NOTE — PROGRESS NOTES
AISHA Reynolds    [facility] - [facility]  [address], [address]  [place] [address]  Phone: [phone]  Fax: [phone]     History of Present Illness         Patient ID: Nohemy Machuca is a 67 y.o. female.    PCP: Dinorah Tolentino MD      HPI     Patient presents for Preop clearance     Denies fever, chills  Denies chest pain, shortness of breath, palpitations    Denies unexplained weight loss    Surgery date:  2/14/2023  Procedure: Cysto, turbt w/ instill gemcitabine  Surgeon: Dr Suarez  [facility]    Patient states Polyp removal from bladder     Had preop ekg, labs done yesterday - will request    Patient reports nausea with anesthesia, otherwise denies personal history of anesthesia reaction, surgical complication  Denies family history of anesthesia/ surgical complications     Denies history of sleep apnea    Denies allergy to latex     denies Smoking   Denies illicit  drug/substances  ETOH  Denies     Denies exertional symptoms of shortness of breath/ chest pain with climbing 2 flights of stairs, walking 4 blocks      Denies history of DVT, blood clot    Denies epistaxis, gum bleeding, hematuria, melena, or hematochezia.   Denies abnormal bleeding, bruising     Patient without history of renal, cardiac, pulmonary disease       Questionable history of TIA  Has followed with neurology (Dr Damon) and is currently taking aspirin  States she is no longer taking crestor (has been off for about 1 year)- unclear of who discontinued  Per further review of chart- 7/30/2021 crestor D/c due to myalgia s/e      Lab Results   Component Value Date    CHOL 193 09/22/2022     Lab Results   Component Value Date    HDL 98 09/22/2022     Lab Results   Component Value Date    LDLCALC 86 09/22/2022     Lab Results   Component Value Date    TRIG 44 09/22/2022         History of cerebral cavernoma  No longer seeing neurosurgery      Past Medical/Surgical/Family/Social History          The following have been reviewed and updated as appropriate in this visit:   Tobacco  Allergies  Meds  Problems  Med Hx  Surg Hx  Fam Hx         Past Medical History:   Diagnosis Date   • Cerebral cavernoma    • History of tinnitus    • Osteoporosis        Past Surgical History:   Procedure Laterality Date   • BUNIONECTOMY Bilateral    • TONSILLECTOMY         Family History   Problem Relation Age of Onset   • Diverticulitis Biological Mother    • Glaucoma Biological Father        Social History     Tobacco Use   • Smoking status: Never   • Smokeless tobacco: Never   Substance Use Topics   • Alcohol use: Yes     Comment: events   • Drug use: Never       Patient Care Team:  Dinorah Tolentino MD as PCP - General (Internal Medicine)  Marva Damon MD as Consulting Physician (Neurology)    Allergies and Medications         Allergies   Allergen Reactions   • Sulfa (Sulfonamide Antibiotics)      Other reaction(s): Unknown         Current Outpatient Medications   Medication Sig Dispense Refill   • alendronate 70 mg tablet Take 70 mg by mouth once a week.     • aspirin 81 mg enteric coated tablet Take 81 mg by mouth daily.     • cholecalciferol, vitamin D3, 10 mcg (400 unit) capsule Take by mouth.     • estradioL (ESTRACE) 0.01 % (0.1 mg/gram) vaginal cream Apply 1 g into the vagina twice weekly at bedtime 42.5 g 3   • gabapentin (NEURONTIN) 100 mg capsule Take 100 mg by mouth 2 (two) times a day.     • hydrocortisone 2.5 % cream Apply topically 2 (two) times a day. 30 g 1   • lidocaine (LIDODERM) 5 % patch Place 1 patch on the skin daily. Remove & discard patch within 12 hours or as directed by prescriber. 30 patch 1   • MOTEGRITY 2 mg tablet Take 2 mg by mouth once daily.     • multivitamin tablet Take by mouth daily.     • polyethylene glycol (MIRALAX) 17 gram/dose powder Take 17 g by mouth daily.     • multivit with minerals/lutein (MULTIVITAMIN 50 PLUS ORAL) Take by mouth.       No current  facility-administered medications for this visit.       Review of Systems         Review of Systems   Constitutional: Negative for chills and fever.   HENT: Negative for congestion, rhinorrhea, sore throat and trouble swallowing.    Respiratory: Negative for cough and shortness of breath.    Cardiovascular: Negative for chest pain, palpitations and leg swelling.   Gastrointestinal: Negative for diarrhea, nausea and vomiting.   Genitourinary: Negative for dysuria, frequency, hematuria and urgency.   Neurological: Negative for dizziness, syncope and headaches.   Hematological: Does not bruise/bleed easily.         Physical Examination           Vitals:    02/02/23 0900   BP: 102/60   Pulse: (!) 57   Resp: 16   Temp: 36.3 °C (97.4 °F)   SpO2: 97%       Physical Exam  Vitals reviewed.   Constitutional:       General: She is not in acute distress.     Appearance: Normal appearance. She is not ill-appearing, toxic-appearing or diaphoretic.   HENT:      Head: Normocephalic.      Right Ear: Hearing, tympanic membrane and ear canal normal.      Left Ear: Hearing, tympanic membrane and ear canal normal.      Nose: Nose normal.      Mouth/Throat:      Pharynx: Oropharynx is clear. Uvula midline. No pharyngeal swelling, oropharyngeal exudate or posterior oropharyngeal erythema.   Eyes:      General: No scleral icterus.     Extraocular Movements: Extraocular movements intact.      Conjunctiva/sclera: Conjunctivae normal.      Pupils: Pupils are equal, round, and reactive to light.   Neck:      Thyroid: No thyroid mass, thyromegaly or thyroid tenderness.   Cardiovascular:      Rate and Rhythm: Normal rate and regular rhythm.      Pulses: Normal pulses.      Heart sounds: Normal heart sounds. No murmur heard.  Pulmonary:      Effort: Pulmonary effort is normal. No respiratory distress.      Breath sounds: Normal breath sounds.   Abdominal:      General: Bowel sounds are normal. There is no distension.      Palpations: Abdomen is  soft. There is no mass.      Tenderness: There is no abdominal tenderness.   Musculoskeletal:         General: Normal range of motion.      Cervical back: Normal range of motion.      Right lower leg: No edema.      Left lower leg: No edema.   Lymphadenopathy:      Cervical: No cervical adenopathy.   Skin:     General: Skin is warm.   Neurological:      General: No focal deficit present.      Mental Status: She is alert and oriented to person, place, and time.      Cranial Nerves: No cranial nerve deficit.      Sensory: No sensory deficit.      Motor: No weakness.      Coordination: Coordination normal.      Gait: Gait normal.   Psychiatric:         Mood and Affect: Mood normal.         Behavior: Behavior normal.         Thought Content: Thought content normal.         Judgment: Judgment normal.           Assessment and Plan         Assessment/Plan     Diagnoses and all orders for this visit:    Preoperative clearance (Primary)  Assessment & Plan:  Scheduled for Cysto, turbt w/ instill gemcitabine 2/14/2023 with Dr Suarez  Exam stable, vital signs normal  EKG performed at [place] Yesterday- stable  Recent labs obtained by [facility]- stable  Previous history of adverse reactions to anesthesia: Nausea, otherwise None reported.  Risk for cardiopulmonary complication: Low risk. No recent or past MI noted in the PMH, no unstable or stable angina, no active or uncompensated heart failure, no present or past history of arrhythmias, v-fib, or SCD (personal or familial history). No history of DOMO, COPD, asthma, PE, or other pulmonary conditions. Patient denies exertional chest pain/angina, NICOLE, or SOB. Able to tolerate activity and exercise.   Risk for DVT/PE: Low-/no risk for this procedure. (Use surgical protocol prophylaxis for DVT/PE as indicated in inpatient/surgical setting).  Risk for ETOH abuse/withdrawal complications: Low to no risk/none.  Difficult airway: No known complications with airway.   Medically  cleared for proposed surgery  Pre and postop instructions per surgeon  4 wk follow up        Bladder polyp  Assessment & Plan:  Following with urology, scheduled for Cysto, turbt w/ instill gemcitabine 2/14/2023 Dr Suarez      Trigeminal neuralgia  Assessment & Plan:  Stable chronic condition   Management per neurology      TIA (transient ischemic attack)  Assessment & Plan:  Event occurred 4/2021  Has followed with neurology (Dr Damon) and is currently taking aspirin  States she is no longer taking crestor (has been off for about 1 year)- unclear of who discontinued  Per further review of chart- 7/30/2021 crestor D/c due to myalgia s/e  Recommend follow up with neurology          No follow-ups on file.    No orders of the defined types were placed in this encounter.    I spent a total of 35 minutes on this date of service performing the following activities:  Pre-visit medical record and diagnostic testing and medical consultant report review, obtaining history from patient, performing examination, entering orders, comprehensive medication reconciliation, counseling and education, and documentation of visit.          CARRI Camejo FNP    2/2/2023

## 2023-02-02 NOTE — ASSESSMENT & PLAN NOTE
Scheduled for Cysto, turbt w/ instill gemcitabine 2/14/2023 with Dr Suarez  Exam stable, vital signs normal  EKG performed at [facility]terday- stable  [facility]- [facility]  Previous history of adverse reactions to anesthesia: Nausea, otherwise None reported.  Risk for cardiopulmonary complication: Low risk. No recent or past MI noted in the PMH, no unstable or stable angina, no active or uncompensated heart failure, no present or past history of arrhythmias, v-fib, or SCD (personal or familial history). No history of DOMO, COPD, asthma, PE, or other pulmonary conditions. Patient denies exertional chest pain/angina, NICOLE, or SOB. Able to tolerate activity and exercise.   Risk for DVT/PE: Low-/no risk for this procedure. (Use surgical protocol prophylaxis for DVT/PE as indicated in inpatient/surgical setting).  Risk for ETOH abuse/withdrawal complications: Low to no risk/none.  Difficult airway: No known complications with airway.   Medically cleared for proposed surgery  Pre and postop instructions per surgeon  4 wk follow up

## 2023-02-02 NOTE — ASSESSMENT & PLAN NOTE
Event occurred 4/2021  Has followed with neurology (Dr Damon) and is currently taking aspirin  States she is no longer taking crestor (has been off for about 1 year)- unclear of who discontinued  Per further review of chart- 7/30/2021 crestor D/c due to myalgia s/e  Recommend follow up with neurology

## 2023-02-06 RX ORDER — ESTRADIOL 0.1 MG/G
CREAM VAGINAL
Qty: 127.5 G | Refills: 1 | Status: SHIPPED | OUTPATIENT
Start: 2023-02-06 | End: 2023-05-30 | Stop reason: SDUPTHER

## 2023-02-06 NOTE — TELEPHONE ENCOUNTER
Medicine Refill Request    Last Office: 1/5/2023   Last Consult Visit: 2/2/2023  Last Telemedicine Visit: Visit date not found    Next Appointment: 3/17/2023      Current Outpatient Medications:   •  alendronate 70 mg tablet, Take 70 mg by mouth once a week., Disp: , Rfl:   •  aspirin 81 mg enteric coated tablet, Take 81 mg by mouth daily., Disp: , Rfl:   •  cholecalciferol, vitamin D3, 10 mcg (400 unit) capsule, Take by mouth., Disp: , Rfl:   •  estradioL (ESTRACE) 0.01 % (0.1 mg/gram) vaginal cream, Apply 1 g into the vagina twice weekly at bedtime, Disp: 42.5 g, Rfl: 3  •  gabapentin (NEURONTIN) 100 mg capsule, Take 100 mg by mouth 2 (two) times a day., Disp: , Rfl:   •  hydrocortisone 2.5 % cream, Apply topically 2 (two) times a day., Disp: 30 g, Rfl: 1  •  lidocaine (LIDODERM) 5 % patch, Place 1 patch on the skin daily. Remove & discard patch within 12 hours or as directed by prescriber., Disp: 30 patch, Rfl: 1  •  MOTEGRITY 2 mg tablet, Take 2 mg by mouth once daily., Disp: , Rfl:   •  multivit with minerals/lutein (MULTIVITAMIN 50 PLUS ORAL), Take by mouth., Disp: , Rfl:   •  multivitamin tablet, Take by mouth daily., Disp: , Rfl:   •  polyethylene glycol (MIRALAX) 17 gram/dose powder, Take 17 g by mouth daily., Disp: , Rfl:       BP Readings from Last 3 Encounters:   02/02/23 102/60   01/05/23 105/62   12/02/22 109/65       Recent Lab results:  Lab Results   Component Value Date    CHOL 193 09/22/2022   ,   Lab Results   Component Value Date    HDL 98 09/22/2022   ,   Lab Results   Component Value Date    LDLCALC 86 09/22/2022   ,   Lab Results   Component Value Date    TRIG 44 09/22/2022        Lab Results   Component Value Date    GLUCOSE 87 04/17/2021   ,   Lab Results   Component Value Date    HGBA1C 4.8 04/17/2021         Lab Results   Component Value Date    CREATININE 0.6 04/17/2021       Lab Results   Component Value Date    TSH 4.56 04/16/2021

## 2023-02-07 NOTE — TELEPHONE ENCOUNTER
Shannon's message to me (copied/ pasted below):  Pt requesting a 3 month supply ordered through [facility]. (I am not seeing the option to choose 90 day supply)

## 2023-03-17 ENCOUNTER — OFFICE VISIT (OUTPATIENT)
Dept: PRIMARY CARE | Facility: CLINIC | Age: 68
End: 2023-03-17
Payer: COMMERCIAL

## 2023-03-17 ENCOUNTER — TELEPHONE (OUTPATIENT)
Dept: PRIMARY CARE | Facility: CLINIC | Age: 68
End: 2023-03-17

## 2023-03-17 VITALS
RESPIRATION RATE: 17 BRPM | BODY MASS INDEX: 16.03 KG/M2 | HEIGHT: 65 IN | HEART RATE: 68 BPM | DIASTOLIC BLOOD PRESSURE: 62 MMHG | OXYGEN SATURATION: 99 % | WEIGHT: 96.2 LBS | SYSTOLIC BLOOD PRESSURE: 101 MMHG

## 2023-03-17 DIAGNOSIS — C67.9 MALIGNANT NEOPLASM OF URINARY BLADDER, UNSPECIFIED SITE (CMS/HCC): ICD-10-CM

## 2023-03-17 DIAGNOSIS — R10.84 GENERALIZED POSTPRANDIAL ABDOMINAL PAIN: Primary | ICD-10-CM

## 2023-03-17 PROCEDURE — 3008F BODY MASS INDEX DOCD: CPT | Performed by: INTERNAL MEDICINE

## 2023-03-17 PROCEDURE — 99213 OFFICE O/P EST LOW 20 MIN: CPT | Performed by: INTERNAL MEDICINE

## 2023-03-17 RX ORDER — BUSPIRONE HYDROCHLORIDE 5 MG/1
5 TABLET ORAL 3 TIMES DAILY
Qty: 270 TABLET | Refills: 1 | Status: SHIPPED | OUTPATIENT
Start: 2023-03-17 | End: 2023-06-30 | Stop reason: SDUPTHER

## 2023-03-17 ASSESSMENT — ENCOUNTER SYMPTOMS
ABDOMINAL PAIN: 1
CONSTIPATION: 1

## 2023-03-17 NOTE — ASSESSMENT & PLAN NOTE
Patient has severe pain and diarrhea after eating-avoids doing so when she can.  Weight is low.  She is working with a nutritionist, pelvic floor PT, motility specialist and gastroenterologist.  - Today we discussed a few options.  We discussed a TCA-pt reports she was on this in the past for another condition and it resulted in an episode of syncope.    - Discussed some evidence for buspirone before meals.  We will trial this and she will first try 2.5 mg before meals to avoid hypotension  - Continue with pelvic floor PT  - FU in 2 months

## 2023-03-17 NOTE — TELEPHONE ENCOUNTER
Request faxed to the office of Vito Suarez MD [facility] Carlos Enrique Calvo

## 2023-03-17 NOTE — TELEPHONE ENCOUNTER
----- Message from Dinorah Tolentino MD sent at 3/17/2023 10:45 AM EDT -----  Can you please request notes from Dr Suarez with [facility]?  Pt diagnosed with bladder cancer recently thx

## 2023-03-17 NOTE — PATIENT INSTRUCTIONS
- Try taking 1/2 (2.5 mg) of buspirone three times a day 30-60  minutes before eating to see if this helps with abdominal pain and fullness.    Anastamosis is the connection in the bowel

## 2023-03-17 NOTE — Clinical Note
Can you please request notes from Dr Suarez with [facility]?  Pt diagnosed with bladder cancer recently thx

## 2023-03-17 NOTE — PROGRESS NOTES
Dinorah Tolentino MD    [facility]  [address]  [place] [address]  Phone: [phone]  Fax: [phone]     History of Present Illness     Subjective     Patient ID: Nohemy Machuca is a 67 y.o. female.    Pt comes in to follow up on chronic conditions    Saw Urology for Bladder Polyp, had resection with Dr. Suarez, will be having cystoscopy for monitoring.  Told it was a low grade cancerous tumor-looking to see if has returned.  Continues to have abdominal pain, gas, decreased appetite  PT is discouraged  Issues with covering nutritionist with insurance  Fainted with amitriptyline in the past  Doesn't eat because gets diarrhea.       Past Medical/Surgical/Family/Social History       The following have been reviewed and updated as appropriate in this visit:          Past Medical History:   Diagnosis Date   • Bladder cancer (CMS/HCC)    • Cerebral cavernoma    • Constipation    • History of tinnitus    • Osteoporosis        Past Surgical History:   Procedure Laterality Date   • BUNIONECTOMY Bilateral    • RECTAL PROLAPSE REPAIR     • SUBTOTAL COLECTOMY     • TONSILLECTOMY         Family History   Problem Relation Age of Onset   • Diverticulitis Biological Mother    • Glaucoma Biological Father        Social History     Tobacco Use   • Smoking status: Never   • Smokeless tobacco: Never   Substance Use Topics   • Alcohol use: Yes     Comment: events   • Drug use: Never       Patient Care Team:  Dinorah Tolentino MD as PCP - General (Internal Medicine)  Marva Damon MD as Consulting Physician (Neurology)     Allergies and Medications       Allergies   Allergen Reactions   • Sulfa (Sulfonamide Antibiotics)      Other reaction(s): Unknown       Current Outpatient Medications   Medication Sig Dispense Refill   • alendronate 70 mg tablet Take 70 mg by mouth once a week.     • busPIRone (BUSPAR) 5 mg tablet Take 1 tablet (5 mg total) by mouth 3 (three) times a  "day. 30-60 minutes before a meal 270 tablet 1   • cholecalciferol, vitamin D3, 10 mcg (400 unit) capsule Take by mouth.     • estradioL (ESTRACE) 0.01 % (0.1 mg/gram) vaginal cream Apply 1 g into the vagina twice weekly at bedtime 127.5 g 1   • gabapentin (NEURONTIN) 100 mg capsule Take 100 mg by mouth 2 (two) times a day.     • lidocaine (LIDODERM) 5 % patch Place 1 patch on the skin daily. Remove & discard patch within 12 hours or as directed by prescriber. (Patient taking differently: Place 1 patch on the skin daily as needed. Remove & discard patch within 12 hours or as directed by prescriber.) 30 patch 1   • MOTEGRITY 2 mg tablet Take 2 mg by mouth once daily.     • multivit with minerals/lutein (MULTIVITAMIN 50 PLUS ORAL) Take by mouth.     • polyethylene glycol (MIRALAX) 17 gram/dose powder Take 17 g by mouth daily.     • aspirin 81 mg enteric coated tablet Take 81 mg by mouth daily.     • hydrocortisone 2.5 % cream Apply topically 2 (two) times a day. (Patient not taking: Reported on 3/17/2023) 30 g 1     No current facility-administered medications for this visit.          Review of Systems       Review of Systems   Gastrointestinal: Positive for abdominal pain and constipation.        Physical Examination       Objective     Vitals:    03/17/23 0928   BP: 101/62   Pulse: 68   Resp: 17   SpO2: 99%       Pulse Readings from Last 5 Encounters:   03/17/23 68   02/02/23 (!) 57   08/19/22 (!) 57   07/18/22 (!) 54   03/21/22 61       Wt Readings from Last 5 Encounters:   03/17/23 43.6 kg (96 lb 3.2 oz)   02/02/23 43.9 kg (96 lb 12.8 oz)   01/05/23 44.3 kg (97 lb 9.6 oz)   12/02/22 42.9 kg (94 lb 9.6 oz)   08/19/22 44.6 kg (98 lb 6.4 oz)       Ht Readings from Last 5 Encounters:   03/17/23 1.651 m (5' 5\")   02/02/23 1.651 m (5' 5\")   01/05/23 1.638 m (5' 4.5\")   12/02/22 1.638 m (5' 4.5\")   08/19/22 1.638 m (5' 4.5\")       BP Readings from Last 5 Encounters:   03/17/23 101/62   02/02/23 102/60   01/05/23 105/62 "   12/02/22 109/65   08/19/22 (!) 112/54       BMI Readings from Last 4 Encounters:   03/17/23 16.01 kg/m²   02/02/23 16.11 kg/m²   01/05/23 16.49 kg/m²   12/02/22 15.99 kg/m²       Physical Exam  Constitutional:       Appearance: Normal appearance.   HENT:      Nose: Nose normal.      Mouth/Throat:      Mouth: Mucous membranes are moist.   Eyes:      Conjunctiva/sclera: Conjunctivae normal.   Cardiovascular:      Rate and Rhythm: Normal rate and regular rhythm.      Heart sounds: Normal heart sounds. No murmur heard.  Pulmonary:      Effort: Pulmonary effort is normal.      Breath sounds: Normal breath sounds. No wheezing.   Abdominal:      Palpations: Abdomen is soft.      Comments: Increased bowel sounds   Neurological:      Mental Status: She is alert.   Psychiatric:         Mood and Affect: Mood normal.         Behavior: Behavior normal.         Thought Content: Thought content normal.          Laboratory Results     Lab Results   Component Value Date    WBC 4.90 04/17/2021    WBC 8.45 04/16/2021    WBC 6.02 09/18/2020    HGB 13.1 04/17/2021    HGB 14.4 04/16/2021    HGB 13.0 09/18/2020    HCT 38.3 04/17/2021    HCT 40.3 04/16/2021    HCT 38.5 09/18/2020    MCV 94.8 04/17/2021    MCV 91.4 04/16/2021    MCV 99.7 (H) 09/18/2020     04/17/2021     04/16/2021     09/18/2020        Lab Results   Component Value Date    GLUCOSE 87 04/17/2021    GLUCOSE 91 04/17/2021    GLUCOSE 88 04/16/2021    CALCIUM 9.0 04/17/2021    CALCIUM 9.3 04/17/2021    CALCIUM 9.0 04/16/2021     (L) 04/17/2021     04/17/2021     (L) 04/16/2021    K 4.3 04/17/2021    K 4.4 04/17/2021    K 3.9 04/16/2021    CO2 26 04/17/2021    CO2 28 04/17/2021    CO2 25 04/16/2021     04/17/2021     04/17/2021     04/16/2021    BUN 15 04/17/2021    BUN 12 04/17/2021    BUN 15 04/16/2021    CREATININE 0.6 04/17/2021    CREATININE 0.5 (L) 04/17/2021    CREATININE 0.5 (L) 04/16/2021    ALKPHOS 70  04/16/2021    ALKPHOS 51 09/17/2017    ALKPHOS 67 03/16/2017    AST 38 04/16/2021    AST 31 09/17/2017    AST 32 03/16/2017    ALT 30 04/16/2021    ALT 26 09/17/2017    ALT 25 03/16/2017    BILITOT 0.6 04/16/2021    BILITOT 0.6 09/17/2017    BILITOT 0.4 03/16/2017    ALBUMIN 4.8 04/16/2021    ALBUMIN 4.5 09/17/2017    ALBUMIN 4.4 03/16/2017       Lab Results   Component Value Date    CHOL 193 09/22/2022    CHOL 191 04/17/2021    CHOL 209 (H) 09/18/2017     Lab Results   Component Value Date    HDL 98 09/22/2022    HDL 71 04/17/2021    HDL 81 09/18/2017     Lab Results   Component Value Date    LDLCALC 86 09/22/2022    LDLCALC 113 (H) 04/17/2021    LDLCALC 117 (H) 09/18/2017     Lab Results   Component Value Date    TRIG 44 09/22/2022    TRIG 37 04/17/2021    TRIG 54 09/18/2017       The 10-year ASCVD risk score (Renee DK, et al., 2019) is: 2.7%    Values used to calculate the score:      Age: 67 years      Sex: Female      Is Non- : No      Diabetic: No      Tobacco smoker: No      Systolic Blood Pressure: 101 mmHg      Is BP treated: No      HDL Cholesterol: 98 mg/dL      Total Cholesterol: 193 mg/dL    Lab Results   Component Value Date    TSH 4.56 04/16/2021    TSH 2.34 01/04/2021    TSH 1.69 09/17/2017     No results found for: FREET4      Lab Results   Component Value Date    HGBA1C 4.8 04/17/2021       No results found for: CREATUR  No results found for: MICROALBUR  No results found for: ALBCRET    No results found for: HEPCAB    Lab Results   Component Value Date    MG 2.0 09/17/2017            Immunization History   Administered Date(s) Administered   • INFLUENZA VACCINE QUAD ADJUVANTED 65 and OLDER 12/02/2022   • Influenza Vaccine Quadrivalent 3 Yr And Older 11/01/2016   • Influenza Vaccine Quadrivalent Preservative Free 6 Mons and Up 11/08/2019   • Influenza, Unspecified 11/01/2016, 11/15/2016   • Pneumococcal Conjugate 13-Valent 09/27/2021   • Pneumococcal Polysaccharide  10/02/2022   • SARS-COV-2 (COVID-19) Unspecified Immunization 03/22/2021   • Sars-cov-2 (Covid-19) Vaccine, Fabio 04/07/2020   • Tdap 08/26/2015         Health Maintenance Topics with due status: Overdue       Topic Date Due    Colorectal Cancer Screening Never done    Depression Screening Never done    HIV Screening Never done    Cervical Cancer Screening Never done    Zoster Vaccine Never done    Hepatitis B Vaccines Never done    COVID-19 Vaccine 05/17/2021    Falls Risk Screening Never done     Health Maintenance Topics with due status: Not Due       Topic Last Completion Date    DTaP, Tdap, and Td Vaccines 08/26/2015    Breast Cancer Screening 07/27/2021    DEXA Scan 10/27/2021     Health Maintenance Topics with due status: Completed       Topic Last Completion Date    Pneumococcal (65 years and older) 10/02/2022    Influenza Vaccine 12/02/2022     Health Maintenance Topics with due status: Aged Out       Topic Date Due    Meningococcal ACWY Aged Out    HIB Vaccines Aged Out    IPV Vaccines Aged Out    HPV Vaccines Aged Out        Assessment and Plan       Assessment/Plan     Problem List Items Addressed This Visit        Nervous    Generalized postprandial abdominal pain - Primary    Current Assessment & Plan     Patient has severe pain and diarrhea after eating-avoids doing so when she can.  Weight is low.  She is working with a nutritionist, pelvic floor PT, motility specialist and gastroenterologist.  - Today we discussed a few options.  We discussed a TCA-pt reports she was on this in the past for another condition and it resulted in an episode of syncope.    - Discussed some evidence for buspirone before meals.  We will trial this and she will first try 2.5 mg before meals to avoid hypotension  - Continue with pelvic floor PT  - FU in 2 months            Genitourinary    Malignant neoplasm of urinary bladder (CMS/HCC)    Overview     Seen on CT, removed by Dr. Suarez 2/2023.  Will have cystoscopy for  surveillance.         Current Assessment & Plan     Seen on CT, removed by Dr. Suarez 2/2023.  Will have cystoscopy for surveillance.            Return in about 2 months (around 5/17/2023) for abdominal pain.    No orders of the defined types were placed in this encounter.             Dinorah Tolentino MD    3/17/2023

## 2023-04-19 ENCOUNTER — OFFICE VISIT (OUTPATIENT)
Dept: PRIMARY CARE | Facility: CLINIC | Age: 68
End: 2023-04-19
Payer: COMMERCIAL

## 2023-04-19 VITALS — HEART RATE: 60 BPM | DIASTOLIC BLOOD PRESSURE: 62 MMHG | SYSTOLIC BLOOD PRESSURE: 98 MMHG | OXYGEN SATURATION: 97 %

## 2023-04-19 DIAGNOSIS — R05.3 PERSISTENT COUGH: Primary | ICD-10-CM

## 2023-04-19 DIAGNOSIS — J30.1 SEASONAL ALLERGIC RHINITIS DUE TO POLLEN: ICD-10-CM

## 2023-04-19 PROCEDURE — 99213 OFFICE O/P EST LOW 20 MIN: CPT | Performed by: NURSE PRACTITIONER

## 2023-04-19 RX ORDER — BENZONATATE 100 MG/1
100 CAPSULE ORAL 2 TIMES DAILY PRN
Qty: 30 CAPSULE | Refills: 0 | Status: SHIPPED | OUTPATIENT
Start: 2023-04-19 | End: 2023-04-29

## 2023-04-19 RX ORDER — LEVOCETIRIZINE DIHYDROCHLORIDE 5 MG/1
5 TABLET, FILM COATED ORAL EVERY EVENING
Qty: 90 TABLET | Refills: 1 | Status: SHIPPED | OUTPATIENT
Start: 2023-04-19 | End: 2023-08-17

## 2023-04-19 ASSESSMENT — PATIENT HEALTH QUESTIONNAIRE - PHQ9: SUM OF ALL RESPONSES TO PHQ9 QUESTIONS 1 & 2: 0

## 2023-04-19 NOTE — PATIENT INSTRUCTIONS
Nasal saline spray 3 x per day (arm and hammer simply saline)    Use humidifier at night or steam from shower     Hydration with water and plenty of rest    Start anithisamine (allergy med)

## 2023-04-19 NOTE — PROGRESS NOTES
AISHA Reynolds    [facility]  [address]  [place] [address]  Phone: [phone]  Fax: [phone]     History of Present Illness         Patient ID: Nohemy Machuca is a 67 y.o. female.    PCP: Dinorah Tolentino MD      HPI   Patient presents for Persisting cough x 1 month  States Not as bad but concerned she still has to take cough meds to get to sleep at night      Coughing through the day  Coughing at night with laying down  Cough occurs after 20 mins of exercise    +post nasal drip   + rhinorrhea    Denies shortness of breath chest pain   Some discomfort on deep inspiration    Using cough drops through the day   Cough syrup at night    Denies fever  Denies gerd  denies nausea, vomiting, diarrhea       Working on hydration with water  Urinating normally, denies urinary symptoms    watching 14 month old grandson who also goes to  - often sick with viral illnesses      Has allergies and post nasal drip  rhinorrhea  Not taking allergy meds     Past Medical/Surgical/Family/Social History         The following have been reviewed and updated as appropriate in this visit:   Tobacco  Allergies  Meds  Problems  Med Hx  Surg Hx  Fam Hx         Past Medical History:   Diagnosis Date   • Bladder cancer (CMS/HCC)    • Cerebral cavernoma    • Constipation    • History of tinnitus    • Osteoporosis        Past Surgical History:   Procedure Laterality Date   • BUNIONECTOMY Bilateral    • RECTAL PROLAPSE REPAIR     • SUBTOTAL COLECTOMY     • TONSILLECTOMY         Family History   Problem Relation Age of Onset   • Diverticulitis Biological Mother    • Glaucoma Biological Father        Social History     Tobacco Use   • Smoking status: Never   • Smokeless tobacco: Never   Substance Use Topics   • Alcohol use: Yes     Comment: events   • Drug use: Never       Patient Care Team:  Dinorah Tolentino MD as PCP - General (Internal Medicine)  Nelson  Marva CAMARENA MD as Consulting Physician (Neurology)    Allergies and Medications         Allergies   Allergen Reactions   • Sulfa (Sulfonamide Antibiotics)      Other reaction(s): Unknown         Current Outpatient Medications   Medication Sig Dispense Refill   • alendronate 70 mg tablet Take 70 mg by mouth once a week.     • aspirin 81 mg enteric coated tablet Take 81 mg by mouth daily.     • benzonatate (TESSALON PERLES) 100 mg capsule Take 1 capsule (100 mg total) by mouth 2 (two) times a day as needed for cough for up to 10 days. 30 capsule 0   • busPIRone (BUSPAR) 5 mg tablet Take 1 tablet (5 mg total) by mouth 3 (three) times a day. 30-60 minutes before a meal 270 tablet 1   • cholecalciferol, vitamin D3, 10 mcg (400 unit) capsule Take by mouth.     • estradioL (ESTRACE) 0.01 % (0.1 mg/gram) vaginal cream Apply 1 g into the vagina twice weekly at bedtime 127.5 g 1   • gabapentin (NEURONTIN) 100 mg capsule Take 100 mg by mouth 2 (two) times a day.     • levocetirizine (XYZAL) 5 mg tablet Take 1 tablet (5 mg total) by mouth every evening. 90 tablet 1   • MOTEGRITY 2 mg tablet Take 2 mg by mouth once daily.     • multivit with minerals/lutein (MULTIVITAMIN 50 PLUS ORAL) Take by mouth.     • polyethylene glycol (MIRALAX) 17 gram/dose powder Take 17 g by mouth daily.     • hydrocortisone 2.5 % cream Apply topically 2 (two) times a day. (Patient not taking: Reported on 3/17/2023) 30 g 1   • lidocaine (LIDODERM) 5 % patch Place 1 patch on the skin daily. Remove & discard patch within 12 hours or as directed by prescriber. (Patient taking differently: Place 1 patch on the skin daily as needed. Remove & discard patch within 12 hours or as directed by prescriber.) 30 patch 1     No current facility-administered medications for this visit.       Review of Systems         Review of Systems   Constitutional: Negative for chills, fatigue and fever.   HENT: Positive for postnasal drip and rhinorrhea. Negative for ear pain, sinus  pain and sore throat.    Respiratory: Positive for cough. Negative for shortness of breath and wheezing.    Cardiovascular: Negative for chest pain and palpitations.   Gastrointestinal: Negative for constipation, diarrhea, nausea and vomiting.   Genitourinary: Negative for dysuria, frequency, hematuria and urgency.         Physical Examination           Vitals:    04/19/23 0836   BP: 98/62   Pulse: 60   SpO2: 97%       Physical Exam  Vitals reviewed.   Constitutional:       General: She is not in acute distress.     Appearance: Normal appearance. She is not ill-appearing, toxic-appearing or diaphoretic.   HENT:      Head: Normocephalic.      Right Ear: Hearing and tympanic membrane normal.      Left Ear: Hearing and tympanic membrane normal.      Nose: Mucosal edema, congestion and rhinorrhea present. Rhinorrhea is clear.      Mouth/Throat:      Lips: Pink.      Mouth: Mucous membranes are moist.      Pharynx: No pharyngeal swelling or posterior oropharyngeal erythema.      Comments: + posterior rhinorrhea    Eyes:      General: No scleral icterus.     Conjunctiva/sclera: Conjunctivae normal.   Cardiovascular:      Rate and Rhythm: Normal rate and regular rhythm.      Pulses: Normal pulses.      Heart sounds: Normal heart sounds. No murmur heard.  Pulmonary:      Effort: Pulmonary effort is normal. No respiratory distress.      Breath sounds: Normal breath sounds. No stridor. No wheezing, rhonchi or rales.   Musculoskeletal:      Cervical back: Normal range of motion.      Right lower leg: No edema.      Left lower leg: No edema.   Lymphadenopathy:      Cervical: No cervical adenopathy.   Skin:     General: Skin is warm.   Neurological:      General: No focal deficit present.      Mental Status: She is alert and oriented to person, place, and time.   Psychiatric:         Mood and Affect: Mood normal.         Behavior: Behavior normal.         Thought Content: Thought content normal.           Assessment and Plan          Assessment/Plan     Diagnoses and all orders for this visit:    Persistent cough (Primary)  Assessment & Plan:  3-4 week duration of persisting cough  Notes rhinorrhea, post nasal drip   Though patient states to be improving  Exam and vitals stable- lung sounds clear  Discussed antihistamine and supportive measures for allergy type symptoms  Added cough suppressant for PRN qhs   Follow up if no better 2 weeks, sooner if worsening/changes    Orders:  -     benzonatate (TESSALON PERLES) 100 mg capsule; Take 1 capsule (100 mg total) by mouth 2 (two) times a day as needed for cough for up to 10 days.    Seasonal allergic rhinitis due to pollen  Assessment & Plan:  Stable  Notes rhinorrhea, post nasal drip   Not currently taking meds  Possibly contributing to cough symptom  Exam stable  Encouraged antihistamine, nasal saline, humidified air  Follow up 2-3 week if no improvement    Orders:  -     levocetirizine (XYZAL) 5 mg tablet; Take 1 tablet (5 mg total) by mouth every evening.       No follow-ups on file.    No orders of the defined types were placed in this encounter.      I spent a total of 25 minutes on this date of service performing the following activities:  Pre-visit medical record and diagnostic testing and medical consultant report review, obtaining history from patient, performing examination, entering orders, comprehensive medication reconciliation, counseling and education, and documentation of visit.        CARRI Camejo FNP    4/20/2023

## 2023-04-20 PROBLEM — J30.1 SEASONAL ALLERGIC RHINITIS DUE TO POLLEN: Status: ACTIVE | Noted: 2023-04-20

## 2023-04-20 PROBLEM — Z87.898 HISTORY OF PERSISTENT COUGH: Status: ACTIVE | Noted: 2023-04-20

## 2023-04-20 ASSESSMENT — ENCOUNTER SYMPTOMS
CHILLS: 0
RHINORRHEA: 1
NAUSEA: 0
COUGH: 1
PALPITATIONS: 0
HEMATURIA: 0
WHEEZING: 0
VOMITING: 0
FREQUENCY: 0
DYSURIA: 0
SINUS PAIN: 0
DIARRHEA: 0
FATIGUE: 0
CONSTIPATION: 0
SHORTNESS OF BREATH: 0
FEVER: 0
SORE THROAT: 0

## 2023-04-20 NOTE — ASSESSMENT & PLAN NOTE
Stable  Notes rhinorrhea, post nasal drip   Not currently taking meds  Possibly contributing to cough symptom  Exam stable  Encouraged antihistamine, nasal saline, humidified air  Follow up 2-3 week if no improvement

## 2023-04-20 NOTE — ASSESSMENT & PLAN NOTE
3-4 week duration of persisting cough  Notes rhinorrhea, post nasal drip   Though patient states to be improving  Exam and vitals stable- lung sounds clear  Discussed antihistamine and supportive measures for allergy type symptoms  Added cough suppressant for PRN qhs   Follow up if no better 2 weeks, sooner if worsening/changes

## 2023-05-30 ENCOUNTER — TELEPHONE (OUTPATIENT)
Dept: PRIMARY CARE | Facility: CLINIC | Age: 68
End: 2023-05-30
Payer: COMMERCIAL

## 2023-05-30 RX ORDER — ESTRADIOL 0.1 MG/G
CREAM VAGINAL
Qty: 127.5 G | Refills: 3 | Status: SHIPPED | OUTPATIENT
Start: 2023-05-30 | End: 2023-06-12 | Stop reason: SDUPTHER

## 2023-05-30 NOTE — TELEPHONE ENCOUNTER
Medication Request   Patient PCP: Dinorah Tolentino MD  Next Office Visit: 6/30/2023  Has this provider prescribed this medication before?:  Yes  estradioL (ESTRACE) 0.01 % (0.1 mg/gram) vaginal cream     [id number] [place] [address] [address]   Phone:  [phone]  Fax:  [phone]          Pt completely out needs as soon as possible. Please advise once sent [phone]

## 2023-06-01 ENCOUNTER — TELEPHONE (OUTPATIENT)
Dept: PRIMARY CARE | Facility: CLINIC | Age: 68
End: 2023-06-01

## 2023-06-21 ENCOUNTER — TELEPHONE (OUTPATIENT)
Dept: PRIMARY CARE | Facility: CLINIC | Age: 68
End: 2023-06-21
Payer: COMMERCIAL

## 2023-06-21 RX ORDER — ESTRADIOL 0.1 MG/G
CREAM VAGINAL
Qty: 127.5 G | Refills: 3 | Status: SHIPPED | OUTPATIENT
Start: 2023-06-21

## 2023-06-21 NOTE — TELEPHONE ENCOUNTER
Medication Request   Patient PCP: Dinorah Tolentino MD  Next Office Visit: 6/30/2023  Has this provider prescribed this medication before?: Yes   Medication Name: Estradiol   Medication Dose: 0.01% vaginal cream   Medication Frequency: 90 day   Preferred Pharmacy:   [facility] [address]  [phone]    Please allow 2 business days for your provider to send your medication request or to reach out to discuss.

## 2023-06-21 NOTE — TELEPHONE ENCOUNTER
Resent refill request to mail order pharmacy per patient's request.    Medicine Refill Request    Last Office: 4/19/2023   Last Consult Visit: 2/2/2023  Last Telemedicine Visit: Visit date not found    Next Appointment: 6/30/2023      Current Outpatient Medications:   •  alendronate 70 mg tablet, Take 70 mg by mouth once a week., Disp: , Rfl:   •  aspirin 81 mg enteric coated tablet, Take 81 mg by mouth daily., Disp: , Rfl:   •  busPIRone (BUSPAR) 5 mg tablet, Take 1 tablet (5 mg total) by mouth 3 (three) times a day. 30-60 minutes before a meal, Disp: 270 tablet, Rfl: 1  •  cholecalciferol, vitamin D3, 10 mcg (400 unit) capsule, Take by mouth., Disp: , Rfl:   •  estradioL (ESTRACE) 0.01 % (0.1 mg/gram) vaginal cream, Apply 1 g into the vagina three times per week at bedtime, Disp: 127.5 g, Rfl: 3  •  gabapentin (NEURONTIN) 100 mg capsule, Take 100 mg by mouth 2 (two) times a day., Disp: , Rfl:   •  hydrocortisone 2.5 % cream, Apply topically 2 (two) times a day. (Patient not taking: Reported on 3/17/2023), Disp: 30 g, Rfl: 1  •  levocetirizine (XYZAL) 5 mg tablet, Take 1 tablet (5 mg total) by mouth every evening., Disp: 90 tablet, Rfl: 1  •  lidocaine (LIDODERM) 5 % patch, Place 1 patch on the skin daily. Remove & discard patch within 12 hours or as directed by prescriber. (Patient taking differently: Place 1 patch on the skin daily as needed. Remove & discard patch within 12 hours or as directed by prescriber.), Disp: 30 patch, Rfl: 1  •  MOTEGRITY 2 mg tablet, Take 2 mg by mouth once daily., Disp: , Rfl:   •  multivit with minerals/lutein (MULTIVITAMIN 50 PLUS ORAL), Take by mouth., Disp: , Rfl:   •  polyethylene glycol (MIRALAX) 17 gram/dose powder, Take 17 g by mouth daily., Disp: , Rfl:       BP Readings from Last 3 Encounters:   04/19/23 98/62   03/17/23 101/62   02/02/23 102/60       Recent Lab results:  Lab Results   Component Value Date    CHOL 193 09/22/2022   ,   Lab Results   Component Value Date     HDL 98 09/22/2022   ,   Lab Results   Component Value Date    LDLCALC 86 09/22/2022   ,   Lab Results   Component Value Date    TRIG 44 09/22/2022        Lab Results   Component Value Date    GLUCOSE 87 04/17/2021   ,   Lab Results   Component Value Date    HGBA1C 4.8 04/17/2021         Lab Results   Component Value Date    CREATININE 0.6 04/17/2021       Lab Results   Component Value Date    TSH 4.56 04/16/2021

## 2023-06-30 ENCOUNTER — OFFICE VISIT (OUTPATIENT)
Dept: PRIMARY CARE | Facility: CLINIC | Age: 68
End: 2023-06-30
Payer: COMMERCIAL

## 2023-06-30 VITALS
OXYGEN SATURATION: 99 % | RESPIRATION RATE: 15 BRPM | SYSTOLIC BLOOD PRESSURE: 104 MMHG | WEIGHT: 92 LBS | HEART RATE: 63 BPM | BODY MASS INDEX: 15.33 KG/M2 | DIASTOLIC BLOOD PRESSURE: 64 MMHG | HEIGHT: 65 IN

## 2023-06-30 DIAGNOSIS — R10.84 GENERALIZED POSTPRANDIAL ABDOMINAL PAIN: Primary | ICD-10-CM

## 2023-06-30 PROCEDURE — 99213 OFFICE O/P EST LOW 20 MIN: CPT | Performed by: INTERNAL MEDICINE

## 2023-06-30 PROCEDURE — 3008F BODY MASS INDEX DOCD: CPT | Performed by: INTERNAL MEDICINE

## 2023-06-30 RX ORDER — BUSPIRONE HYDROCHLORIDE 5 MG/1
5 TABLET ORAL 3 TIMES DAILY
Qty: 270 TABLET | Refills: 1 | Status: SHIPPED | OUTPATIENT
Start: 2023-06-30 | End: 2024-04-13

## 2023-06-30 RX ORDER — BUSPIRONE HYDROCHLORIDE 5 MG/1
5 TABLET ORAL 3 TIMES DAILY
Qty: 270 TABLET | Refills: 1 | Status: SHIPPED | OUTPATIENT
Start: 2023-06-30 | End: 2023-06-30 | Stop reason: SDUPTHER

## 2023-06-30 NOTE — Clinical Note
Can you please request last labs from 's rheumatologist Michelle Saldana (PA or NP who works with Dr. Anne) in [place] as well as the last note.  Last bloodwork was 2 weeks ago.  Thank yoU!

## 2023-07-01 ASSESSMENT — ENCOUNTER SYMPTOMS
ABDOMINAL PAIN: 0
APPETITE CHANGE: 1

## 2023-07-01 NOTE — ASSESSMENT & PLAN NOTE
Patient with chronic constipation seeing nutritionist and motility specialsit had severe pain and diarrhea after eating-avoided doing so.  Weight is low.  She is working with a nutritionist, pelvic floor PT, motility specialist and gastroenterologist.  We added buspirone 2.5 mg before meals to see if this would help with gastric relaxation.  She is unsure if it helped but pain resolved and she stopped the medication a couple of weeks ago-still doing well.  - of note pt reports she was on this in the past for another condition and it resulted in an episode of syncope.    -  Advised pt to stay on the buspirone to see if this can help with her early satiety and huger, increase to 5 mg TID since tolerating and likely some anxiety component  - Continue to see GI  - Pt has applied for a medical marijuana card-I agree with trying this  - Continue with pelvic floor PT  - FU in november

## 2023-07-06 ENCOUNTER — TELEPHONE (OUTPATIENT)
Dept: PRIMARY CARE | Facility: CLINIC | Age: 68
End: 2023-07-06
Payer: COMMERCIAL

## 2023-07-06 ENCOUNTER — APPOINTMENT (OUTPATIENT)
Dept: URBAN - METROPOLITAN AREA CLINIC 203 | Age: 68
Setting detail: DERMATOLOGY
End: 2023-07-06

## 2023-07-06 DIAGNOSIS — B35.3 TINEA PEDIS: ICD-10-CM

## 2023-07-06 DIAGNOSIS — L03.01 CELLULITIS OF FINGER: ICD-10-CM

## 2023-07-06 PROBLEM — L03.012 CELLULITIS OF LEFT FINGER: Status: ACTIVE | Noted: 2023-07-06

## 2023-07-06 PROBLEM — L03.011 CELLULITIS OF RIGHT FINGER: Status: ACTIVE | Noted: 2023-07-06

## 2023-07-06 PROCEDURE — OTHER COUNSELING: OTHER

## 2023-07-06 PROCEDURE — OTHER PRESCRIPTION MEDICATION MANAGEMENT: OTHER

## 2023-07-06 PROCEDURE — 99203 OFFICE O/P NEW LOW 30 MIN: CPT

## 2023-07-06 PROCEDURE — OTHER PRESCRIPTION: OTHER

## 2023-07-06 RX ORDER — MUPIROCIN 20 MG/G
OINTMENT TOPICAL QHS
Qty: 22 | Refills: 2 | Status: ERX | COMMUNITY
Start: 2023-07-06

## 2023-07-06 RX ORDER — KETOCONAZOLE 20 MG/G
CREAM TOPICAL QHS
Qty: 60 | Refills: 2 | Status: ERX | COMMUNITY
Start: 2023-07-06

## 2023-07-06 ASSESSMENT — LOCATION DETAILED DESCRIPTION DERM
LOCATION DETAILED: LEFT THUMBNAIL
LOCATION DETAILED: RIGHT PLANTAR FOREFOOT OVERLYING 2ND METATARSAL
LOCATION DETAILED: LEFT ANTERIOR DISTAL THIGH
LOCATION DETAILED: RIGHT THUMBNAIL

## 2023-07-06 ASSESSMENT — LOCATION SIMPLE DESCRIPTION DERM
LOCATION SIMPLE: LEFT THIGH
LOCATION SIMPLE: RIGHT THUMBNAIL
LOCATION SIMPLE: LEFT THUMBNAIL
LOCATION SIMPLE: RIGHT PLANTAR SURFACE

## 2023-07-06 ASSESSMENT — LOCATION ZONE DERM
LOCATION ZONE: FEET
LOCATION ZONE: FINGERNAIL
LOCATION ZONE: LEG

## 2023-07-06 NOTE — TELEPHONE ENCOUNTER
----- Message from Dinorah Tolentino MD sent at 6/30/2023  9:38 AM EDT -----  Can you please request last labs from 's rheumatologist Michelle Saldana (PA or NP who works with Dr. Anne) in [place] as well as the last note.  Last bloodwork was 2 weeks ago.  Thank yoU!

## 2023-07-06 NOTE — PROCEDURE: PRESCRIPTION MEDICATION MANAGEMENT
Render In Strict Bullet Format?: No
Detail Level: Zone
Initiate Treatment: Hibiclens soak (mix warm water & Hibiclens)  x 15 mins qhs and then apply \\nMupirocin qhs x6 weeks to finger nails qhs\\n\\nDo not cut cuticles
Initiate Treatment: Ketoconazole qhs x4 weeks

## 2023-07-21 ENCOUNTER — TELEPHONE (OUTPATIENT)
Dept: PRIMARY CARE | Facility: CLINIC | Age: 68
End: 2023-07-21
Payer: COMMERCIAL

## 2023-07-24 NOTE — TELEPHONE ENCOUNTER
LVM requesting a callback or message through [email or web address] to see what it is she is needing hematology for.

## 2023-08-10 ENCOUNTER — TELEPHONE (OUTPATIENT)
Dept: PRIMARY CARE | Facility: CLINIC | Age: 68
End: 2023-08-10
Payer: COMMERCIAL

## 2023-08-10 NOTE — TELEPHONE ENCOUNTER
[facility] Appointment Request   Provider:   Appointment Type: office visit   Reason for Visit: patient appointment got cancelled for November. She is calling to reschedule. Patient will like to be rescheduled before November.   Available Day and Time:before November   Best Contact Number: [phone]    The practice will reach out to schedule your appointment within the next 2 business days.

## 2023-08-17 ENCOUNTER — OFFICE VISIT (OUTPATIENT)
Dept: PRIMARY CARE | Facility: CLINIC | Age: 68
End: 2023-08-17
Payer: COMMERCIAL

## 2023-08-17 ENCOUNTER — HOSPITAL ENCOUNTER (OUTPATIENT)
Dept: RADIOLOGY | Facility: CLINIC | Age: 68
Discharge: HOME | End: 2023-08-17
Attending: FAMILY MEDICINE
Payer: COMMERCIAL

## 2023-08-17 VITALS
HEIGHT: 65 IN | SYSTOLIC BLOOD PRESSURE: 94 MMHG | BODY MASS INDEX: 15.53 KG/M2 | WEIGHT: 93.2 LBS | RESPIRATION RATE: 16 BRPM | TEMPERATURE: 97.6 F | DIASTOLIC BLOOD PRESSURE: 52 MMHG

## 2023-08-17 DIAGNOSIS — R05.2 SUBACUTE COUGH: Primary | ICD-10-CM

## 2023-08-17 DIAGNOSIS — J31.0 RHINITIS, UNSPECIFIED TYPE: ICD-10-CM

## 2023-08-17 DIAGNOSIS — R05.2 SUBACUTE COUGH: ICD-10-CM

## 2023-08-17 PROBLEM — R31.29 MICROSCOPIC HEMATURIA: Status: ACTIVE | Noted: 2023-01-13

## 2023-08-17 PROBLEM — H04.123 DRY EYES: Status: ACTIVE | Noted: 2023-08-17

## 2023-08-17 PROBLEM — R93.49 ABNORMAL FINDINGS ON DIAGNOSTIC IMAGING OF URINARY ORGANS: Status: ACTIVE | Noted: 2023-01-13

## 2023-08-17 PROCEDURE — 3008F BODY MASS INDEX DOCD: CPT | Performed by: FAMILY MEDICINE

## 2023-08-17 PROCEDURE — 71046 X-RAY EXAM CHEST 2 VIEWS: CPT

## 2023-08-17 PROCEDURE — 99213 OFFICE O/P EST LOW 20 MIN: CPT | Performed by: FAMILY MEDICINE

## 2023-08-17 RX ORDER — METHYLPREDNISOLONE 4 MG/1
4 TABLET ORAL
Qty: 21 TABLET | Refills: 0 | Status: SHIPPED | OUTPATIENT
Start: 2023-08-17 | End: 2023-08-24

## 2023-08-17 RX ORDER — POLYETHYLENE GLYCOL 3350 17 G/17G
17 POWDER, FOR SOLUTION ORAL DAILY
Qty: 510 G | COMMUNITY
Start: 2023-08-17

## 2023-08-17 RX ORDER — GABAPENTIN 100 MG/1
100 CAPSULE ORAL NIGHTLY
COMMUNITY
Start: 2023-08-17

## 2023-08-17 ASSESSMENT — ENCOUNTER SYMPTOMS
ABDOMINAL PAIN: 0
NAUSEA: 0
DIFFICULTY URINATING: 0
ROS GI COMMENTS: NO CHANGE IN BMS
WHEEZING: 0
SINUS PRESSURE: 0
DYSURIA: 0
FATIGUE: 0
VOMITING: 0
SINUS PAIN: 0
FEVER: 0
APPETITE CHANGE: 0
TROUBLE SWALLOWING: 0
SHORTNESS OF BREATH: 0
CHILLS: 0
COUGH: 1
EYE DISCHARGE: 0
ADENOPATHY: 0
SORE THROAT: 1
DIARRHEA: 0
RHINORRHEA: 0
FREQUENCY: 0
VOICE CHANGE: 0
MYALGIAS: 0

## 2023-08-17 NOTE — PROGRESS NOTES
Roseanne Galvan DO    [facility]  [address], [address]  [place] [address]  Phone: [phone]  Fax: [phone]     History of Present Illness   Subjective     Patient ID: Nohemy Machuca is a 68 y.o. female.    Nohemy is here today for URI symptoms for about a month that are on and off and variable. Watches a grandchild who has been sick on and off. Bothersome sinus pressure, R ear pressure, cough. No TOB, shortness of breath or inhaler use.Exam is normal so will chk CXR and start MucinexDM BID and FLonase qd and given Medrol dose pack. If no better, worse or having concerns then call me.  Vitals normal except that BMI is low and BP is low normal  She is a pt of Dr HENLEY, not known to me  Med list reviewed/updated     Past Medical/Surgical/Family/Social History     The following have been reviewed and updated as appropriate in this visit:   Allergies  Meds  Problems         Past Medical History:   Diagnosis Date   • Bladder cancer (CMS/HCC)    • Cerebral cavernoma    • Constipation    • History of tinnitus    • Osteoporosis        Past Surgical History:   Procedure Laterality Date   • BUNIONECTOMY Bilateral    • RECTAL PROLAPSE REPAIR     • SUBTOTAL COLECTOMY     • TONSILLECTOMY         Family History   Problem Relation Age of Onset   • Diverticulitis Biological Mother    • Glaucoma Biological Father        Social History     Tobacco Use   • Smoking status: Never   • Smokeless tobacco: Never   Substance Use Topics   • Alcohol use: Yes     Comment: events   • Drug use: Never      Allergies and Medications     Allergies   Allergen Reactions   • Sulfa (Sulfonamide Antibiotics)      Other reaction(s): Unknown         Outpatient Encounter Medications as of 8/17/2023:   •  gabapentin (NEURONTIN) 100 mg capsule, Take 1 capsule (100 mg total) by mouth nightly.  •  methylPREDNISolone (MEDROL DOSEPACK) 4 mg tablet, Take 1 tablet (4 mg total) by mouth with snacks (as directed) for  up to 7 days. Follow package directions.  •  polyethylene glycol (MIRALAX) 17 gram/dose powder, Take 17 g by mouth daily. prn  •  alendronate 70 mg tablet, Take 70 mg by mouth once a week.  •  aspirin 81 mg enteric coated tablet, Take 81 mg by mouth daily.  •  busPIRone (BUSPAR) 5 mg tablet, Take 1 tablet (5 mg total) by mouth 3 (three) times a day. 30-60 minutes before a meal  •  cholecalciferol, vitamin D3, 10 mcg (400 unit) capsule, Take by mouth.  •  estradioL (ESTRACE) 0.01 % (0.1 mg/gram) vaginal cream, Apply 1 g into the vagina three times per week at bedtime  •  hydrocortisone 2.5 % cream, Apply topically 2 (two) times a day. (Patient not taking: Reported on 3/17/2023)  •  lidocaine (LIDODERM) 5 % patch, Place 1 patch on the skin daily. Remove & discard patch within 12 hours or as directed by prescriber. (Patient taking differently: Place 1 patch on the skin daily as needed. Remove & discard patch within 12 hours or as directed by prescriber.)  •  MOTEGRITY 2 mg tablet, Take 2 mg by mouth once daily.  •  multivit with minerals/lutein (MULTIVITAMIN 50 PLUS ORAL), Take by mouth.  •  [DISCONTINUED] gabapentin (NEURONTIN) 100 mg capsule, Take 100 mg by mouth 2 (two) times a day.  •  [DISCONTINUED] levocetirizine (XYZAL) 5 mg tablet, Take 1 tablet (5 mg total) by mouth every evening.  •  [DISCONTINUED] polyethylene glycol (MIRALAX) 17 gram/dose powder, Take 17 g by mouth daily.   Review of Systems       Review of Systems   Constitutional: Negative for appetite change, chills, fatigue and fever.   HENT: Positive for sore throat (that is mild now and is off and on). Negative for congestion, ear discharge, ear pain, hearing loss, postnasal drip, rhinorrhea, sinus pressure, sinus pain, trouble swallowing and voice change.         R ear clogged and some pain   Eyes: Negative for discharge.   Respiratory: Positive for cough (that is dry). Negative for shortness of breath and wheezing.    Cardiovascular: Negative for  "leg swelling.   Gastrointestinal: Negative for abdominal pain, diarrhea, nausea and vomiting.        No change in BMs   Genitourinary: Negative for decreased urine volume, difficulty urinating, dysuria and frequency.   Musculoskeletal: Negative for myalgias.   Skin: Negative for rash.   Allergic/Immunologic: Negative for environmental allergies.   Hematological: Negative for adenopathy.   Psychiatric/Behavioral: Negative for behavioral problems.      Physical Examination       Objective     Vitals:    08/17/23 1436   BP: (!) 94/52   Resp: 16   Temp: 36.4 °C (97.6 °F)       Wt Readings from Last 3 Encounters:   08/17/23 42.3 kg (93 lb 3.2 oz)   06/30/23 41.7 kg (92 lb)   03/17/23 43.6 kg (96 lb 3.2 oz)       Body mass index is 15.51 kg/m².    Ht Readings from Last 3 Encounters:   08/17/23 1.651 m (5' 5\")   06/30/23 1.651 m (5' 5\")   03/17/23 1.651 m (5' 5\")       BP Readings from Last 3 Encounters:   08/17/23 (!) 94/52   06/30/23 104/64   04/19/23 98/62       Physical Exam  Vitals and nursing note reviewed.   Constitutional:       General: She is not in acute distress.     Appearance: She is well-developed. She is not ill-appearing.   HENT:      Head: Normocephalic and atraumatic.      Right Ear: Tympanic membrane and ear canal normal.      Left Ear: Tympanic membrane and ear canal normal.      Nose:      Comments: Nasal membranes are pale     Mouth/Throat:      Pharynx: Oropharynx is clear. No oropharyngeal exudate or posterior oropharyngeal erythema.   Eyes:      General:         Right eye: No discharge.         Left eye: No discharge.      Conjunctiva/sclera: Conjunctivae normal.   Neck:      Thyroid: No thyromegaly.   Cardiovascular:      Rate and Rhythm: Normal rate and regular rhythm.      Heart sounds: Normal heart sounds.   Pulmonary:      Effort: Pulmonary effort is normal. No respiratory distress.      Breath sounds: Normal breath sounds. No wheezing.   Musculoskeletal:      Cervical back: Neck supple. "   Lymphadenopathy:      Cervical: No cervical adenopathy.   Skin:     General: Skin is warm and dry.      Findings: No rash.   Neurological:      Mental Status: She is alert and oriented to person, place, and time.   Psychiatric:         Behavior: Behavior normal. Behavior is cooperative.        Laboratory Results     Lab Results   Component Value Date    WBC 4.90 04/17/2021    HGB 13.1 04/17/2021    HCT 38.3 04/17/2021    MCV 94.8 04/17/2021     04/17/2021          Chemistry        Component Value Date/Time     (L) 04/17/2021 1235    K 4.3 04/17/2021 1235     04/17/2021 1235    CO2 26 04/17/2021 1235    BUN 15 04/17/2021 1235    CREATININE 0.6 04/17/2021 1235        Component Value Date/Time    CALCIUM 9.0 04/17/2021 1235    ALKPHOS 70 04/16/2021 1812    AST 38 04/16/2021 1812    ALT 30 04/16/2021 1812    BILITOT 0.6 04/16/2021 1812            Lab Results   Component Value Date    GLUCOSE 87 04/17/2021    CALCIUM 9.0 04/17/2021     (L) 04/17/2021    K 4.3 04/17/2021    CO2 26 04/17/2021     04/17/2021    BUN 15 04/17/2021    CREATININE 0.6 04/17/2021       Lab Results   Component Value Date    CHOL 193 09/22/2022    CHOL 191 04/17/2021    CHOL 209 (H) 09/18/2017     Lab Results   Component Value Date    HDL 98 09/22/2022    HDL 71 04/17/2021    HDL 81 09/18/2017     Lab Results   Component Value Date    LDLCALC 86 09/22/2022    LDLCALC 113 (H) 04/17/2021    LDLCALC 117 (H) 09/18/2017     Lab Results   Component Value Date    TRIG 44 09/22/2022    TRIG 37 04/17/2021    TRIG 54 09/18/2017     No results found for: CHOLHDL    Lab Results   Component Value Date    TSH 4.56 04/16/2021       Lab Results   Component Value Date    HGBA1C 4.8 04/17/2021       No results found for: HEPCAB      Immunization History   Administered Date(s) Administered   • INFLUENZA VACCINE QUAD ADJUVANTED 65 and OLDER 12/02/2022   • Influenza Vaccine Quadrivalent 3 Yr And Older 11/01/2016   • Influenza Vaccine  Quadrivalent Preservative Free 6 Mons and Up 11/08/2019   • Influenza, Unspecified 11/01/2016, 11/15/2016   • Pneumococcal Conjugate 13-Valent 09/27/2021   • Pneumococcal Polysaccharide 10/02/2022   • SARS-COV-2 (COVID-19) Unspecified Immunization 03/22/2021   • SARS-COV-2 (COVID19) VACCINE, PFIZER MONOVALENT 03/22/2021   • Sars-cov-2 (Covid-19) Vaccine, Fabio 04/07/2020, 03/22/2021   • Tdap 08/26/2015         Health Maintenance Topics with due status: Overdue       Topic Date Due    Colorectal Cancer Screening Never done    Zoster Vaccine Never done    Hepatitis B Vaccines Never done    COVID-19 Vaccine 05/17/2021    Breast Cancer Screening 07/27/2022    Falls Risk Screening Never done    Influenza Vaccine 08/01/2023     Health Maintenance Topics with due status: Not Due       Topic Last Completion Date    DTaP, Tdap, and Td Vaccines 08/26/2015    DEXA Scan 10/27/2021    Depression Screening 04/19/2023     Health Maintenance Topics with due status: Completed       Topic Last Completion Date    Pneumococcal (65 years and older) 10/02/2022     Health Maintenance Topics with due status: Aged Out       Topic Date Due    Meningococcal ACWY Aged Out    HIB Vaccines Aged Out    IPV Vaccines Aged Out    HPV Vaccines Aged Out          Assessment and Plan       Assessment/Plan     Problem List Items Addressed This Visit        Respiratory    Subacute cough - Primary    Current Assessment & Plan     chk CXR  Mucinex DM         Relevant Orders    X-RAY CHEST 2 VIEWS       Infectious/Inflammatory    Rhinitis    Current Assessment & Plan     Medrol dose pack  Flonase qd            Return for FU when due w your regular dr, prn.    Orders Placed This Encounter   Procedures   • X-RAY CHEST 2 VIEWS     Standing Status:   Future     Standing Expiration Date:   8/17/2024     Order Specific Question:   Release to patient     Answer:   Immediate            Roseanne Galvan,   8/17/2023

## 2023-08-18 NOTE — TELEPHONE ENCOUNTER
----- Message from Roseanne Galvan, DO sent at 8/17/2023  8:30 PM EDT -----  Not sure she looks in portal so let her know that her CXR is normal

## 2023-08-31 ENCOUNTER — APPOINTMENT (OUTPATIENT)
Dept: URBAN - METROPOLITAN AREA CLINIC 203 | Age: 68
Setting detail: DERMATOLOGY
End: 2023-08-31

## 2023-08-31 DIAGNOSIS — L03.01 CELLULITIS OF FINGER: ICD-10-CM

## 2023-08-31 PROBLEM — L03.011 CELLULITIS OF RIGHT FINGER: Status: ACTIVE | Noted: 2023-08-31

## 2023-08-31 PROBLEM — L03.012 CELLULITIS OF LEFT FINGER: Status: ACTIVE | Noted: 2023-08-31

## 2023-08-31 PROCEDURE — OTHER PRESCRIPTION MEDICATION MANAGEMENT: OTHER

## 2023-08-31 PROCEDURE — OTHER PRESCRIPTION: OTHER

## 2023-08-31 PROCEDURE — 99213 OFFICE O/P EST LOW 20 MIN: CPT

## 2023-08-31 RX ORDER — TACROLIMUS 1 MG/G
OINTMENT TOPICAL
Qty: 30 | Refills: 0 | Status: ERX | COMMUNITY
Start: 2023-08-31

## 2023-08-31 RX ORDER — BETAMETHASONE DIPROPIONATE 0.5 MG/G
OINTMENT TOPICAL
Qty: 45 | Refills: 1 | Status: ERX | COMMUNITY
Start: 2023-08-31

## 2023-08-31 ASSESSMENT — LOCATION DETAILED DESCRIPTION DERM
LOCATION DETAILED: LEFT DISTAL DORSAL MIDDLE FINGER
LOCATION DETAILED: RIGHT MIDDLE FINGERNAIL
LOCATION DETAILED: PERIUNGUAL SKIN LEFT INDEX FINGER
LOCATION DETAILED: LEFT RING FINGERNAIL

## 2023-08-31 ASSESSMENT — LOCATION SIMPLE DESCRIPTION DERM
LOCATION SIMPLE: LEFT MIDDLE FINGER
LOCATION SIMPLE: LEFT INDEX FINGER
LOCATION SIMPLE: RIGHT MIDDLE FINGERNAIL
LOCATION SIMPLE: LEFT RING FINGERNAIL

## 2023-08-31 ASSESSMENT — LOCATION ZONE DERM
LOCATION ZONE: FINGER
LOCATION ZONE: FINGERNAIL

## 2023-08-31 NOTE — PROCEDURE: PRESCRIPTION MEDICATION MANAGEMENT
Detail Level: Zone
Discontinue Regimen: Will stop Mupirocin ointment
Render In Strict Bullet Format?: No

## 2023-11-03 RX ORDER — LIDOCAINE 50 MG/G
1 PATCH TOPICAL DAILY
Qty: 30 PATCH | Refills: 1 | Status: SHIPPED | OUTPATIENT
Start: 2023-11-03 | End: 2024-01-19

## 2023-11-03 NOTE — TELEPHONE ENCOUNTER
Medicine Refill Request    Last Office Visit: 8/17/2023   Last Consult Visit: 2/2/2023  Last Telemedicine Visit: Visit date not found    Next Appointment: 11/28/2023      Current Outpatient Medications:     alendronate 70 mg tablet, Take 70 mg by mouth once a week., Disp: , Rfl:     aspirin 81 mg enteric coated tablet, Take 81 mg by mouth daily., Disp: , Rfl:     busPIRone (BUSPAR) 5 mg tablet, Take 1 tablet (5 mg total) by mouth 3 (three) times a day. 30-60 minutes before a meal, Disp: 270 tablet, Rfl: 1    cholecalciferol, vitamin D3, 10 mcg (400 unit) capsule, Take by mouth., Disp: , Rfl:     estradioL (ESTRACE) 0.01 % (0.1 mg/gram) vaginal cream, Apply 1 g into the vagina three times per week at bedtime, Disp: 127.5 g, Rfl: 3    gabapentin (NEURONTIN) 100 mg capsule, Take 1 capsule (100 mg total) by mouth nightly., Disp: , Rfl:     hydrocortisone 2.5 % cream, Apply topically 2 (two) times a day. (Patient not taking: Reported on 3/17/2023), Disp: 30 g, Rfl: 1    lidocaine (LIDODERM) 5 % patch, Place 1 patch on the skin daily. Remove & discard patch within 12 hours or as directed by prescriber. (Patient taking differently: Place 1 patch on the skin daily as needed. Remove & discard patch within 12 hours or as directed by prescriber.), Disp: 30 patch, Rfl: 1    MOTEGRITY 2 mg tablet, Take 2 mg by mouth once daily., Disp: , Rfl:     multivit with minerals/lutein (MULTIVITAMIN 50 PLUS ORAL), Take by mouth., Disp: , Rfl:     polyethylene glycol (MIRALAX) 17 gram/dose powder, Take 17 g by mouth daily. prn, Disp: 510 g, Rfl:       BP Readings from Last 3 Encounters:   08/17/23 (!) 94/52   06/30/23 104/64   04/19/23 98/62       Recent Lab results:  Lab Results   Component Value Date    CHOL 193 09/22/2022   ,   Lab Results   Component Value Date    HDL 98 09/22/2022   ,   Lab Results   Component Value Date    LDLCALC 86 09/22/2022   ,   Lab Results   Component Value Date    TRIG 44 09/22/2022        Lab  Results   Component Value Date    GLUCOSE 87 04/17/2021   ,   Lab Results   Component Value Date    HGBA1C 4.8 04/17/2021         Lab Results   Component Value Date    CREATININE 0.6 04/17/2021       Lab Results   Component Value Date    TSH 4.56 04/16/2021           Lab Results   Component Value Date    HGBA1C 4.8 04/17/2021

## 2023-11-28 ENCOUNTER — OFFICE VISIT (OUTPATIENT)
Dept: PRIMARY CARE | Facility: CLINIC | Age: 68
End: 2023-11-28
Payer: COMMERCIAL

## 2023-11-28 VITALS
WEIGHT: 96.6 LBS | DIASTOLIC BLOOD PRESSURE: 58 MMHG | OXYGEN SATURATION: 97 % | HEIGHT: 65 IN | HEART RATE: 70 BPM | BODY MASS INDEX: 16.1 KG/M2 | SYSTOLIC BLOOD PRESSURE: 94 MMHG

## 2023-11-28 DIAGNOSIS — Z13.220 SCREENING FOR CHOLESTEROL LEVEL: ICD-10-CM

## 2023-11-28 DIAGNOSIS — R10.84 GENERALIZED POSTPRANDIAL ABDOMINAL PAIN: Primary | ICD-10-CM

## 2023-11-28 DIAGNOSIS — L97.511 ULCER OF RIGHT FOOT, LIMITED TO BREAKDOWN OF SKIN (CMS/HCC): ICD-10-CM

## 2023-11-28 PROCEDURE — 90471 IMMUNIZATION ADMIN: CPT | Performed by: INTERNAL MEDICINE

## 2023-11-28 PROCEDURE — 3008F BODY MASS INDEX DOCD: CPT | Performed by: INTERNAL MEDICINE

## 2023-11-28 PROCEDURE — 90694 VACC AIIV4 NO PRSRV 0.5ML IM: CPT | Performed by: INTERNAL MEDICINE

## 2023-11-28 PROCEDURE — 99214 OFFICE O/P EST MOD 30 MIN: CPT | Mod: 25 | Performed by: INTERNAL MEDICINE

## 2023-11-28 RX ORDER — CLOBETASOL PROPIONATE 0.5 MG/G
CREAM TOPICAL 2 TIMES DAILY
COMMUNITY
End: 2024-01-08 | Stop reason: SDUPTHER

## 2023-11-28 RX ORDER — HYDROCORTISONE 25 MG/G
CREAM TOPICAL 2 TIMES DAILY
Qty: 30 G | Refills: 1 | Status: SHIPPED | OUTPATIENT
Start: 2023-11-28 | End: 2024-01-08

## 2023-11-28 NOTE — ASSESSMENT & PLAN NOTE
Pt has mild breakdown of skin of right lateral foot-started by scratching due to pruritus from eczema 3 months ago.  Not healing.  She has been vigorously massaging clobetasol cream into the open wound during this time (given to her for general eczema).   - Advised she stop with the clobetasol  - Let warm soapy water run over it in the shower-no rubbing of any kind  - Cover with nonstick bandage and avoid shoes that rub in this area  - There may be a component of impaired wound healing due to low BMI-advised she focus on protein in diet  - If not improving with above measures over the next 3-4 weeks, wound care clinic  - She already has FU with me first week of january

## 2023-11-28 NOTE — PROGRESS NOTES
Dinorah Tolentino MD    [facility]  [address]  [place] [address]  Phone: [phone]  Fax: [phone]     History of Present Illness     Subjective     Patient ID: Nohemy Machuca is a 68 y.o. female.    Pt comes in to follow up on abdominal pain and a new wound on her foot    Pt feels abdominal pain is a bit better-weaned herself off of the buspar 3 weeks ago.  Abdominal pain gone for 5 weeks.  Periodically takes peppermint.  Got bruise from bloodwork 2 weeks ago for rheumatologist, hematoma but healing  Has seen 2 dermatologists one here in the [facility] and one in [place] for eczema.  Has ulcer on foot for at least 3 months.  Getting worse over the last three months.  Put bandaids on so doesn't rub.  Using clobetasol on it-massaging it in  Appetite is doing ok.  Protein sources include Sardines, tunafish, peanutbutter in the morning       Past Medical/Surgical/Family/Social History       The following have been reviewed and updated as appropriate in this visit:          Past Medical History:   Diagnosis Date    Bladder cancer (CMS/HCC)     Cerebral cavernoma     Constipation     History of tinnitus     Osteoporosis        Past Surgical History:   Procedure Laterality Date    BUNIONECTOMY Bilateral     RECTAL PROLAPSE REPAIR      SUBTOTAL COLECTOMY      TONSILLECTOMY         Family History   Problem Relation Age of Onset    Diverticulitis Biological Mother     Glaucoma Biological Father        Social History     Tobacco Use    Smoking status: Never    Smokeless tobacco: Never   Substance Use Topics    Alcohol use: Yes     Comment: events    Drug use: Never       Patient Care Team:  Dinorah Tolentino MD as PCP - General (Internal Medicine)  Marva Damon MD as Consulting Physician (Neurology)     Allergies and Medications       Allergies   Allergen Reactions    Sulfa (Sulfonamide Antibiotics)      Other reaction(s): Unknown  "      Current Outpatient Medications   Medication Sig Dispense Refill    alendronate 70 mg tablet Take 70 mg by mouth once a week.      aspirin 81 mg enteric coated tablet Take 81 mg by mouth daily.      busPIRone (BUSPAR) 5 mg tablet Take 1 tablet (5 mg total) by mouth 3 (three) times a day. 30-60 minutes before a meal 270 tablet 1    cholecalciferol, vitamin D3, 10 mcg (400 unit) capsule Take by mouth.      clobetasoL (TEMOVATE) 0.05 % cream Apply topically 2 (two) times a day.      estradioL (ESTRACE) 0.01 % (0.1 mg/gram) vaginal cream Apply 1 g into the vagina three times per week at bedtime 127.5 g 3    gabapentin (NEURONTIN) 100 mg capsule Take 1 capsule (100 mg total) by mouth nightly.      hydrocortisone 2.5 % cream Apply topically 2 (two) times a day. 30 g 1    lidocaine (LIDODERM) 5 % patch Place 1 patch on the skin daily. Remove & discard patch within 12 hours or as directed by prescriber. 30 patch 1    MOTEGRITY 2 mg tablet Take 2 mg by mouth once daily.      multivit with minerals/lutein (MULTIVITAMIN 50 PLUS ORAL) Take by mouth.      polyethylene glycol (MIRALAX) 17 gram/dose powder Take 17 g by mouth daily. prn 510 g      No current facility-administered medications for this visit.          Review of Systems       Review of Systems     Physical Examination       Objective     Vitals:    11/28/23 1001   BP: (!) 94/58   Pulse: 70   SpO2: 97%       Pulse Readings from Last 5 Encounters:   11/28/23 70   06/30/23 63   04/19/23 60   03/17/23 68   02/02/23 (!) 57       Wt Readings from Last 5 Encounters:   11/28/23 43.8 kg (96 lb 9.6 oz)   08/17/23 42.3 kg (93 lb 3.2 oz)   06/30/23 41.7 kg (92 lb)   03/17/23 43.6 kg (96 lb 3.2 oz)   02/02/23 43.9 kg (96 lb 12.8 oz)       Ht Readings from Last 5 Encounters:   11/28/23 1.651 m (5' 5\")   08/17/23 1.651 m (5' 5\")   06/30/23 1.651 m (5' 5\")   03/17/23 1.651 m (5' 5\")   02/02/23 1.651 m (5' 5\")       BP Readings from Last 5 Encounters:   11/28/23 (!) " 94/58   08/17/23 (!) 94/52   06/30/23 104/64   04/19/23 98/62   03/17/23 101/62       BMI Readings from Last 4 Encounters:   11/28/23 16.08 kg/m²   08/17/23 15.51 kg/m²   06/30/23 15.31 kg/m²   03/17/23 16.01 kg/m²       Physical Exam  Constitutional:       Appearance: Normal appearance.   HENT:      Nose: Nose normal.      Mouth/Throat:      Mouth: Mucous membranes are moist.   Eyes:      Conjunctiva/sclera: Conjunctivae normal.   Cardiovascular:      Rate and Rhythm: Normal rate and regular rhythm.      Heart sounds: Normal heart sounds. No murmur heard.  Pulmonary:      Effort: Pulmonary effort is normal.      Breath sounds: Normal breath sounds. No wheezing.   Abdominal:      Palpations: Abdomen is soft.      Tenderness: There is no abdominal tenderness.   Skin:     Comments: Right foot lateral mild ulceration limited to skin breakdown   Neurological:      Mental Status: She is alert.   Psychiatric:         Mood and Affect: Mood normal.         Behavior: Behavior normal.         Thought Content: Thought content normal.          Laboratory Results     Lab Results   Component Value Date    WBC 4.90 04/17/2021    WBC 8.45 04/16/2021    WBC 6.02 09/18/2020    HGB 13.1 04/17/2021    HGB 14.4 04/16/2021    HGB 13.0 09/18/2020    HCT 38.3 04/17/2021    HCT 40.3 04/16/2021    HCT 38.5 09/18/2020    MCV 94.8 04/17/2021    MCV 91.4 04/16/2021    MCV 99.7 (H) 09/18/2020     04/17/2021     04/16/2021     09/18/2020        Lab Results   Component Value Date    GLUCOSE 87 04/17/2021    GLUCOSE 91 04/17/2021    GLUCOSE 88 04/16/2021    CALCIUM 9.0 04/17/2021    CALCIUM 9.3 04/17/2021    CALCIUM 9.0 04/16/2021     (L) 04/17/2021     04/17/2021     (L) 04/16/2021    K 4.3 04/17/2021    K 4.4 04/17/2021    K 3.9 04/16/2021    CO2 26 04/17/2021    CO2 28 04/17/2021    CO2 25 04/16/2021     04/17/2021     04/17/2021     04/16/2021    BUN 15 04/17/2021    BUN 12 04/17/2021     "BUN 15 04/16/2021    CREATININE 0.6 04/17/2021    CREATININE 0.5 (L) 04/17/2021    CREATININE 0.5 (L) 04/16/2021    ALKPHOS 70 04/16/2021    ALKPHOS 51 09/17/2017    ALKPHOS 67 03/16/2017    AST 38 04/16/2021    AST 31 09/17/2017    AST 32 03/16/2017    ALT 30 04/16/2021    ALT 26 09/17/2017    ALT 25 03/16/2017    BILITOT 0.6 04/16/2021    BILITOT 0.6 09/17/2017    BILITOT 0.4 03/16/2017    ALBUMIN 4.8 04/16/2021    ALBUMIN 4.5 09/17/2017    ALBUMIN 4.4 03/16/2017       Lab Results   Component Value Date    CHOL 193 09/22/2022    CHOL 191 04/17/2021    CHOL 209 (H) 09/18/2017     Lab Results   Component Value Date    HDL 98 09/22/2022    HDL 71 04/17/2021    HDL 81 09/18/2017     Lab Results   Component Value Date    LDLCALC 86 09/22/2022    LDLCALC 113 (H) 04/17/2021    LDLCALC 117 (H) 09/18/2017     Lab Results   Component Value Date    TRIG 44 09/22/2022    TRIG 37 04/17/2021    TRIG 54 09/18/2017       The 10-year ASCVD risk score (Renee BUNN, et al., 2019) is: 2.7%    Values used to calculate the score:      Age: 68 years      Sex: Female      Is Non- : No      Diabetic: No      Tobacco smoker: No      Systolic Blood Pressure: 94 mmHg      Is BP treated: No      HDL Cholesterol: 98 mg/dL      Total Cholesterol: 193 mg/dL    Lab Results   Component Value Date    TSH 4.56 04/16/2021    TSH 2.34 01/04/2021    TSH 1.69 09/17/2017     No results found for: \"FREET4\"      Lab Results   Component Value Date    HGBA1C 4.8 04/17/2021       No results found for: \"CREATUR\"  No results found for: \"MICROALBUR\"  No results found for: \"ALBCRET\"    No results found for: \"HEPCAB\"    Lab Results   Component Value Date    MG 2.0 09/17/2017            Immunization History   Administered Date(s) Administered    INFLUENZA VACCINE QUAD ADJUVANTED 65 and OLDER 12/02/2022, 11/28/2023    Influenza Vaccine Quadrivalent 3 Yr And Older 11/01/2016    Influenza Vaccine Quadrivalent Preservative Free 6 Mons and Up " 11/08/2019    Influenza, Unspecified 11/01/2016, 11/15/2016    Pneumococcal Conjugate 13-Valent 09/27/2021    Pneumococcal Polysaccharide 10/02/2022    SARS-COV-2 (COVID-19) Unspecified Immunization 03/22/2021    SARS-COV-2 (COVID19) VACCINE, PFIZER MONOVALENT 03/22/2021    Sars-cov-2 (Covid-19) Vaccine, Fabio 04/07/2020, 03/22/2021    Tdap 08/26/2015         Health Maintenance Topics with due status: Overdue       Topic Date Due    Colorectal Cancer Screening Never done    Zoster Vaccine Never done    Hepatitis B Vaccines Never done    Breast Cancer Screening 07/27/2022    Falls Risk Screening Never done    COVID-19 Vaccine 09/01/2023    DEXA Scan 10/27/2023     Health Maintenance Topics with due status: Not Due       Topic Last Completion Date    DTaP, Tdap, and Td Vaccines 08/26/2015    Depression Screening 04/19/2023     Health Maintenance Topics with due status: Completed       Topic Last Completion Date    Pneumococcal (65 years and older) 10/02/2022    Influenza Vaccine 11/28/2023     Health Maintenance Topics with due status: Aged Out       Topic Date Due    Meningococcal ACWY Aged Out    HIB Vaccines Aged Out    IPV Vaccines Aged Out    HPV Vaccines Aged Out        Assessment and Plan       Assessment/Plan     Problem List Items Addressed This Visit        Nervous    Generalized postprandial abdominal pain - Primary    Overview     Dr. [facility]  Pelvic floor PT  Last colonoscopy 3/2022         Current Assessment & Plan     Patient with chronic constipation seeing nutritionist and motility specialsit who has recent history of severe pain and diarrhea after eating-avoided doing so however today reports pain is improved-unclear if buspirone was providing some benefit.  She weaned off of it when symptoms improved 5 weeks ago.  Weight is still low but she is up 4 lbs since june.    - Continue to see GI  - Continue with pelvic floor PT exercises at home  - Advised to focus on protein rich foods  - FU  during physical in January            Musculoskeletal    Ulcer of right foot, limited to breakdown of skin (CMS/HCC)    Current Assessment & Plan     Pt has mild breakdown of skin of right lateral foot-started by scratching due to pruritus from eczema 3 months ago.  Not healing.  She has been vigorously massaging clobetasol cream into the open wound during this time (given to her for general eczema).   - Advised she stop with the clobetasol  - Let warm soapy water run over it in the shower-no rubbing of any kind  - Cover with nonstick bandage and avoid shoes that rub in this area  - There may be a component of impaired wound healing due to low BMI-advised she focus on protein in diet  - If not improving with above measures over the next 3-4 weeks, wound care clinic  - She already has FU with me first week of january         Relevant Medications    clobetasoL (TEMOVATE) 0.05 % cream    hydrocortisone 2.5 % cream   Other Visit Diagnoses     Screening for cholesterol level        Relevant Orders    Lipid panel          No follow-ups on file.    Orders Placed This Encounter   Procedures    Influenza vaccine Quad Adjuvanted 65 and Older (FluAd Quad)    Lipid panel     Standing Status:   Future     Number of Occurrences:   1     Standing Expiration Date:   11/28/2024     Order Specific Question:   Release to patient     Answer:   Immediate     Order Specific Question:   Release to patient     Answer:   Immediate [1]              Dinorah Tolentino MD    11/28/2023

## 2023-11-28 NOTE — ASSESSMENT & PLAN NOTE
Patient with chronic constipation seeing nutritionist and motility specialsit who has recent history of severe pain and diarrhea after eating-avoided doing so however today reports pain is improved-unclear if buspirone was providing some benefit.  She weaned off of it when symptoms improved 5 weeks ago.  Weight is still low but she is up 4 lbs since june.    - Continue to see GI  - Continue with pelvic floor PT exercises at home  - Advised to focus on protein rich foods  - FU during physical in January

## 2023-12-13 ENCOUNTER — TELEPHONE (OUTPATIENT)
Dept: PRIMARY CARE | Facility: CLINIC | Age: 68
End: 2023-12-13
Payer: COMMERCIAL

## 2023-12-18 NOTE — TELEPHONE ENCOUNTER
Pt was wanting to inform you of th appt she had for her wound. She is now off the clobetasol cream. She was in a soft cast for a wk now is in a boot, she was given an antibiotic and an antibiotic cream that she uses a very thin layer of. She has another appt with them before she sees you. The wound is open, she is doing her best to keep it dry and get it healed.

## 2024-01-08 ENCOUNTER — OFFICE VISIT (OUTPATIENT)
Dept: PRIMARY CARE | Facility: CLINIC | Age: 69
End: 2024-01-08
Payer: COMMERCIAL

## 2024-01-08 VITALS
HEART RATE: 70 BPM | BODY MASS INDEX: 16.33 KG/M2 | SYSTOLIC BLOOD PRESSURE: 96 MMHG | HEIGHT: 65 IN | WEIGHT: 98 LBS | DIASTOLIC BLOOD PRESSURE: 62 MMHG | OXYGEN SATURATION: 97 %

## 2024-01-08 DIAGNOSIS — C67.9 MALIGNANT NEOPLASM OF URINARY BLADDER, UNSPECIFIED SITE (CMS/HCC): ICD-10-CM

## 2024-01-08 DIAGNOSIS — B18.2 CHRONIC HEPATITIS C WITHOUT HEPATIC COMA (CMS/HCC): ICD-10-CM

## 2024-01-08 DIAGNOSIS — Z00.00 ENCOUNTER FOR GENERAL ADULT MEDICAL EXAMINATION WITHOUT ABNORMAL FINDINGS: Primary | ICD-10-CM

## 2024-01-08 DIAGNOSIS — Z12.31 ENCOUNTER FOR SCREENING MAMMOGRAM FOR MALIGNANT NEOPLASM OF BREAST: ICD-10-CM

## 2024-01-08 DIAGNOSIS — L97.511 ULCER OF RIGHT FOOT, LIMITED TO BREAKDOWN OF SKIN (CMS/HCC): ICD-10-CM

## 2024-01-08 DIAGNOSIS — M06.9 RHEUMATOID ARTHRITIS, INVOLVING UNSPECIFIED SITE, UNSPECIFIED WHETHER RHEUMATOID FACTOR PRESENT (CMS/HCC): ICD-10-CM

## 2024-01-08 DIAGNOSIS — Z13.220 SCREENING FOR CHOLESTEROL LEVEL: ICD-10-CM

## 2024-01-08 DIAGNOSIS — Z78.0 MENOPAUSE: ICD-10-CM

## 2024-01-08 PROCEDURE — 3008F BODY MASS INDEX DOCD: CPT | Performed by: INTERNAL MEDICINE

## 2024-01-08 PROCEDURE — 99397 PER PM REEVAL EST PAT 65+ YR: CPT | Performed by: INTERNAL MEDICINE

## 2024-01-08 RX ORDER — CALCIUM CARBONATE 500(1250)
TABLET ORAL EVERY 12 HOURS
COMMUNITY
End: 2024-06-27 | Stop reason: ALTCHOICE

## 2024-01-08 RX ORDER — CLOBETASOL PROPIONATE 0.5 MG/G
CREAM TOPICAL 2 TIMES DAILY
Qty: 45 G | Refills: 1 | Status: SHIPPED | OUTPATIENT
Start: 2024-01-08

## 2024-01-08 RX ORDER — HYDROXYCHLOROQUINE SULFATE 200 MG/1
TABLET, FILM COATED ORAL
COMMUNITY
Start: 2023-11-30 | End: 2024-06-27 | Stop reason: ALTCHOICE

## 2024-01-08 RX ORDER — METHYLPREDNISOLONE 4 MG/1
4 TABLET ORAL DAILY
COMMUNITY
Start: 2023-11-27 | End: 2024-06-27 | Stop reason: ALTCHOICE

## 2024-01-08 ASSESSMENT — ENCOUNTER SYMPTOMS
CHEST TIGHTNESS: 0
PALPITATIONS: 0
DIARRHEA: 0
ADENOPATHY: 0
DYSPHORIC MOOD: 0
FREQUENCY: 0
CONSTIPATION: 0
FEVER: 0
ABDOMINAL PAIN: 0
NERVOUS/ANXIOUS: 0
HEADACHES: 0
SHORTNESS OF BREATH: 0

## 2024-01-08 NOTE — PROGRESS NOTES
Dinorah Tolentino MD    [facility]  [address], [address]  [place] [address]  Phone: [phone]  Fax: [phone]     History of Present Illness     Subjective     Patient ID: Nohemy Machuca is a 68 y.o. female.    Pt presents for a Physical.  - Exercise:  Nothing intentional.  Unable to exercise while caring for grandson, knees bothering her-discussed importance of keeping up strength  - Diet: varied, gets vegetables, protein (lots of fish), though some limitations as she has tried different diets for her GI symptoms  - Alcohol use: none  - Smoking: none  - Drugs: none  - Mental health: stress due to health but mood is overall good  - No falls in the last year-balance is fine  - Cognition:  No concerns  - Dental Provider: 2x per year, Dr. Huff,  - Opth provider: New contacts, [facility], Dr. Mccormack  - Skin/Derm:  discussed ABCDEs of mole surveillance and sunscreen > 30 SPF.  Skin checks regularly.  [facility] in [place], scheduled in February  - Gyn/mmgm/pap: Normal paps in the past, last one was in 2017, Mammo 9/2021, new order placed last year, did not get done, advised to get-she is in agreement  - dexa (women aged 65 years and older and in younger women whose fracture risk is equal to or greater than that of a 65-year old): 10/2021 on fosamax started in 2022, from rheumatologist.  Michelle Saldana-given new script for DEXA today  - Last [place]:  3/2022, due in 10 years  - The USPSTF recommends screening for hepatitis C virus (HCV) infection in adults aged 18 to 79 years:  Treated with antivirals in 2021 with SVR  - Tdap: 2015  - Pneumonia: UTD, last 2022  - Shingles: advised to get at pharmacy  - COVID Vaccine:  Declines 2023  - Flu shot: UTD 2023  - Adv Directives: did not discuss today  - Cholesterol and glucose: given new script    Interval history:  For nonhealing wound on right foot, seen at wound center, had Unna boot.  Put on doxycycline-she  stopped it because it caused diarrhea.  Now healed, using clobetasol.       Past Medical/Surgical/Family/Social History       The following have been reviewed and updated as appropriate in this visit:          Past Medical History:   Diagnosis Date   • Bladder cancer (CMS/HCC)    • Cerebral cavernoma    • Constipation    • History of tinnitus    • Osteoporosis        Past Surgical History:   Procedure Laterality Date   • BUNIONECTOMY Bilateral    • RECTAL PROLAPSE REPAIR     • SUBTOTAL COLECTOMY     • TONSILLECTOMY         Family History   Problem Relation Age of Onset   • Diverticulitis Biological Mother    • Glaucoma Biological Father        Social History     Tobacco Use   • Smoking status: Never   • Smokeless tobacco: Never   Substance Use Topics   • Alcohol use: Yes     Comment: events   • Drug use: Never       Patient Care Team:  Dinorah Tolentino MD as PCP - General (Internal Medicine)  Marva Damon MD as Consulting Physician (Neurology)     Allergies and Medications       Allergies   Allergen Reactions   • Sulfa (Sulfonamide Antibiotics)      Other reaction(s): Unknown       Current Outpatient Medications   Medication Sig Dispense Refill   • alendronate 70 mg tablet Take 70 mg by mouth once a week.     • aspirin 81 mg enteric coated tablet Take 81 mg by mouth daily.     • calcium carbonate (OS-LOUISA) 500 mg calcium (1,250 mg) tablet every 12 hours.     • cholecalciferol, vitamin D3, 10 mcg (400 unit) capsule Take by mouth.     • clobetasoL (TEMOVATE) 0.05 % cream Apply topically 2 (two) times a day. 45 g 1   • estradioL (ESTRACE) 0.01 % (0.1 mg/gram) vaginal cream Apply 1 g into the vagina three times per week at bedtime 127.5 g 3   • gabapentin (NEURONTIN) 100 mg capsule Take 1 capsule (100 mg total) by mouth nightly.     • hydroxychloroquine (PLAQUENIL) 200 mg tablet TAKE 1 TABLET BY MOUTH ONCE DAILY WITH FOOD     • methylPREDNISolone (MEDROL) 4 mg tablet Take 4 mg by mouth daily.     • MOTEGRITY 2  "mg tablet Take 2 mg by mouth once daily.     • multivit with minerals/lutein (MULTIVITAMIN 50 PLUS ORAL) Take by mouth.     • polyethylene glycol (MIRALAX) 17 gram/dose powder Take 17 g by mouth daily. prn 510 g    • busPIRone (BUSPAR) 5 mg tablet Take 1 tablet (5 mg total) by mouth 3 (three) times a day. 30-60 minutes before a meal 270 tablet 1   • lidocaine (LIDODERM) 5 % patch Place 1 patch on the skin daily. Remove & discard patch within 12 hours or as directed by prescriber. 30 patch 1     No current facility-administered medications for this visit.          Review of Systems       Review of Systems   Constitutional: Negative for fever.   HENT: Negative for hearing loss.    Eyes: Negative for visual disturbance.   Respiratory: Negative for chest tightness and shortness of breath.    Cardiovascular: Negative for chest pain and palpitations.   Gastrointestinal: Negative for abdominal pain, constipation and diarrhea.   Genitourinary: Negative for frequency.   Skin: Negative for rash.   Neurological: Negative for headaches.   Hematological: Negative for adenopathy.   Psychiatric/Behavioral: Negative for dysphoric mood. The patient is not nervous/anxious.         Physical Examination       Objective     Vitals:    01/08/24 0731   BP: 96/62   Pulse: 70   SpO2: 97%       Pulse Readings from Last 5 Encounters:   01/08/24 70   11/28/23 70   06/30/23 63   04/19/23 60   03/17/23 68       Wt Readings from Last 5 Encounters:   01/08/24 44.5 kg (98 lb)   11/28/23 43.8 kg (96 lb 9.6 oz)   08/17/23 42.3 kg (93 lb 3.2 oz)   06/30/23 41.7 kg (92 lb)   03/17/23 43.6 kg (96 lb 3.2 oz)       Ht Readings from Last 5 Encounters:   01/08/24 1.651 m (5' 5\")   11/28/23 1.651 m (5' 5\")   08/17/23 1.651 m (5' 5\")   06/30/23 1.651 m (5' 5\")   03/17/23 1.651 m (5' 5\")       BP Readings from Last 5 Encounters:   01/08/24 96/62   11/28/23 (!) 94/58   08/17/23 (!) 94/52   06/30/23 104/64   04/19/23 98/62       BMI Readings from Last 4 " Encounters:   01/08/24 16.31 kg/m²   11/28/23 16.08 kg/m²   08/17/23 15.51 kg/m²   06/30/23 15.31 kg/m²       Physical Exam  Constitutional:       Appearance: Normal appearance.   HENT:      Right Ear: Tympanic membrane, ear canal and external ear normal.      Left Ear: Tympanic membrane, ear canal and external ear normal.      Nose: Nose normal.      Mouth/Throat:      Mouth: Mucous membranes are moist.   Eyes:      Conjunctiva/sclera: Conjunctivae normal.   Neck:      Thyroid: No thyroid mass or thyromegaly.   Cardiovascular:      Rate and Rhythm: Normal rate and regular rhythm.      Heart sounds: Normal heart sounds.   Pulmonary:      Effort: Pulmonary effort is normal.      Breath sounds: Normal breath sounds.   Abdominal:      General: Bowel sounds are normal.      Palpations: Abdomen is soft.      Tenderness: There is no abdominal tenderness.   Musculoskeletal:      Cervical back: Normal range of motion.   Skin:     Findings: No rash.   Neurological:      Mental Status: She is alert.   Psychiatric:         Behavior: Behavior normal.         Thought Content: Thought content normal.          Laboratory Results     Lab Results   Component Value Date    WBC 4.90 04/17/2021    WBC 8.45 04/16/2021    WBC 6.02 09/18/2020    HGB 13.1 04/17/2021    HGB 14.4 04/16/2021    HGB 13.0 09/18/2020    HCT 38.3 04/17/2021    HCT 40.3 04/16/2021    HCT 38.5 09/18/2020    MCV 94.8 04/17/2021    MCV 91.4 04/16/2021    MCV 99.7 (H) 09/18/2020     04/17/2021     04/16/2021     09/18/2020        Lab Results   Component Value Date    GLUCOSE 87 04/17/2021    GLUCOSE 91 04/17/2021    GLUCOSE 88 04/16/2021    CALCIUM 9.0 04/17/2021    CALCIUM 9.3 04/17/2021    CALCIUM 9.0 04/16/2021     (L) 04/17/2021     04/17/2021     (L) 04/16/2021    K 4.3 04/17/2021    K 4.4 04/17/2021    K 3.9 04/16/2021    CO2 26 04/17/2021    CO2 28 04/17/2021    CO2 25 04/16/2021     04/17/2021     04/17/2021  "    04/16/2021    BUN 15 04/17/2021    BUN 12 04/17/2021    BUN 15 04/16/2021    CREATININE 0.6 04/17/2021    CREATININE 0.5 (L) 04/17/2021    CREATININE 0.5 (L) 04/16/2021    ALKPHOS 70 04/16/2021    ALKPHOS 51 09/17/2017    ALKPHOS 67 03/16/2017    AST 38 04/16/2021    AST 31 09/17/2017    AST 32 03/16/2017    ALT 30 04/16/2021    ALT 26 09/17/2017    ALT 25 03/16/2017    BILITOT 0.6 04/16/2021    BILITOT 0.6 09/17/2017    BILITOT 0.4 03/16/2017    ALBUMIN 4.8 04/16/2021    ALBUMIN 4.5 09/17/2017    ALBUMIN 4.4 03/16/2017       Lab Results   Component Value Date    CHOL 193 09/22/2022    CHOL 191 04/17/2021    CHOL 209 (H) 09/18/2017     Lab Results   Component Value Date    HDL 98 09/22/2022    HDL 71 04/17/2021    HDL 81 09/18/2017     Lab Results   Component Value Date    LDLCALC 86 09/22/2022    LDLCALC 113 (H) 04/17/2021    LDLCALC 117 (H) 09/18/2017     Lab Results   Component Value Date    TRIG 44 09/22/2022    TRIG 37 04/17/2021    TRIG 54 09/18/2017       The 10-year ASCVD risk score (Renee BUNN, et al., 2019) is: 2.8%    Values used to calculate the score:      Age: 68 years      Sex: Female      Is Non- : No      Diabetic: No      Tobacco smoker: No      Systolic Blood Pressure: 96 mmHg      Is BP treated: No      HDL Cholesterol: 98 mg/dL      Total Cholesterol: 193 mg/dL    Lab Results   Component Value Date    TSH 4.56 04/16/2021    TSH 2.34 01/04/2021    TSH 1.69 09/17/2017     No results found for: \"FREET4\"      Lab Results   Component Value Date    HGBA1C 4.8 04/17/2021       No results found for: \"CREATUR\"  No results found for: \"MICROALBUR\"  No results found for: \"ALBCRET\"    No results found for: \"HEPCAB\"    Lab Results   Component Value Date    MG 2.0 09/17/2017            Immunization History   Administered Date(s) Administered   • INFLUENZA VACCINE QUAD ADJUVANTED 65 and OLDER 12/02/2022, 11/28/2023   • Influenza Vaccine Quadrivalent 3 Yr And Older 11/01/2016   • " Influenza Vaccine Quadrivalent Preservative Free 6 Mons and Up 11/08/2019   • Influenza, Unspecified 11/01/2016, 11/15/2016   • Pneumococcal Conjugate 13-Valent 09/27/2021   • Pneumococcal Polysaccharide 10/02/2022   • SARS-COV-2 (COVID-19) Unspecified Immunization 03/22/2021   • SARS-COV-2 (COVID19) VACCINE, PFIZER MONOVALENT 03/22/2021   • Sars-cov-2 (Covid-19) Vaccine, Fabio 04/07/2020, 03/22/2021   • Tdap 08/26/2015         Health Maintenance Topics with due status: Overdue       Topic Date Due    Colorectal Cancer Screening Never done    Zoster Vaccine Never done    Hepatitis B Vaccines Never done    Breast Cancer Screening 07/27/2022    Falls Risk Screening Never done    COVID-19 Vaccine 09/01/2023    DEXA Scan 10/27/2023     Health Maintenance Topics with due status: Not Due       Topic Last Completion Date    DTaP, Tdap, and Td Vaccines 08/26/2015    Depression Screening 04/19/2023     Health Maintenance Topics with due status: Completed       Topic Last Completion Date    Pneumococcal (65 years and older) 10/02/2022    Influenza Vaccine 11/28/2023     Health Maintenance Topics with due status: Aged Out       Topic Date Due    Meningococcal ACWY Aged Out    HIB Vaccines Aged Out    IPV Vaccines Aged Out    HPV Vaccines Aged Out        Assessment and Plan       Assessment/Plan     Problem List Items Addressed This Visit        Digestive    Chronic hepatitis C without hepatic coma (CMS/HCC)    Current Assessment & Plan     Normal LFTs.  S/p treatment.  Recheck quant.         Relevant Orders    HEPATITIS C AB W/RFX TO HCV RNA,PCR       Genitourinary    Malignant neoplasm of urinary bladder (CMS/HCC)    Overview     Seen on CT, removed by Dr. Suarez 2/2023.  Will have cystoscopy for surveillance.         Current Assessment & Plan     Followed by Dr. Suarez            Musculoskeletal    Ulcer of right foot, limited to breakdown of skin (CMS/HCC)    Current Assessment & Plan     Seen by wound center.  Healed.   Using clobetasol for skin breakdown.  Advised 2 weeks then take a break.  Following up with dermatology in 6 weeks.         Relevant Medications    clobetasoL (TEMOVATE) 0.05 % cream       Rheumatologic    Rheumatoid arthritis (CMS/HCC)    Current Assessment & Plan     Followed by Michelle Saldana         Relevant Medications    hydroxychloroquine (PLAQUENIL) 200 mg tablet    methylPREDNISolone (MEDROL) 4 mg tablet       Other    Encounter for general adult medical examination without abnormal findings - Primary    Current Assessment & Plan     - Exercise:  Nothing intentional.  Unable to exercise while caring for grandson, knees bothering her-discussed importance of keeping up strength  - Diet: varied, gets vegetables, protein (lots of fish), though some limitations as she has tried different diets for her GI symptoms  - Alcohol use: none  - Smoking: none  - Drugs: none  - Mental health: stress due to health but mood is overall good  - No falls in the last year-balance is fine  - Cognition:  No concerns  - Dental Provider: 2x per year, Dr. Huff,  - Opth provider: [facility], Dr. Mccormack  - Skin/Derm:  discussed ABCDEs of mole surveillance and sunscreen > 30 SPF.  Skin checks regularly.  [facility] in [place], scheduled in February  - Gyn/mmgm/pap: Normal paps in the past, last one was in 2017, Mammo 9/2021, new order placed last year, did not get done, advised to get-she is in agreement  - dexa (women aged 65 years and older and in younger women whose fracture risk is equal to or greater than that of a 65-year old): 10/2021 on fosamax started in 2022, from rheumatologist.  Michelle Saldana-given new script for DEXA today  - Last [place]:  3/2022, due in 10 years  - The USPSTF recommends screening for hepatitis C virus (HCV) infection in adults aged 18 to 79 years:  Treated with antivirals in 2021 with SVR  - Tdap: 2015  - Pneumonia: UTD, last 2022  - Shingles: advised to get at pharmacy  -  COVID Vaccine:  Declines 2023  - Flu shot: UTD 2023  - Adv Directives: did not discuss today  - Cholesterol and glucose: given new script        Other Visit Diagnoses     Encounter for screening mammogram for malignant neoplasm of breast        Relevant Orders    BI SCREENING MAMMOGRAM BILATERAL(TOMOSYNTHESIS)    Menopause        Relevant Orders    DEXA BONE DENSITY    Screening for cholesterol level        Relevant Orders    Lipid panel          Return in about 3 months (around 4/8/2024) for chronic medical problems.    Orders Placed This Encounter   Procedures   • BI SCREENING MAMMOGRAM BILATERAL(TOMOSYNTHESIS)     Standing Status:   Future     Standing Expiration Date:   3/8/2025     Order Specific Question:   Release to patient     Answer:   Immediate [1]   • DEXA BONE DENSITY     Standing Status:   Future     Standing Expiration Date:   1/8/2025     Order Specific Question:   Release to patient     Answer:   Immediate [1]   • Lipid panel     Standing Status:   Future     Number of Occurrences:   1     Standing Expiration Date:   1/8/2025     Order Specific Question:   Release to patient     Answer:   Immediate [1]   • HEPATITIS C AB W/RFX TO HCV RNA,PCR     Standing Status:   Future     Number of Occurrences:   1     Standing Expiration Date:   1/8/2025     Order Specific Question:   Release to patient     Answer:   Immediate [1]              Dinorah Tolentino MD    1/8/2024

## 2024-01-08 NOTE — PATIENT INSTRUCTIONS
- I recommend creams over lotions. Vanicream is a good one.  Aquaphor is an ointment that is very helpful as well.    - Use the clobetasol twice per day for two weeks at a time-take a break for another 1-2 weeks then you can restart it

## 2024-01-08 NOTE — ASSESSMENT & PLAN NOTE
- Exercise:  Nothing intentional.  Unable to exercise while caring for grandson, knees bothering her-discussed importance of keeping up strength  - Diet: varied, gets vegetables, protein (lots of fish), though some limitations as she has tried different diets for her GI symptoms  - Alcohol use: none  - Smoking: none  - Drugs: none  - Mental health: stress due to health but mood is overall good  - No falls in the last year-balance is fine  - Cognition:  No concerns  - Dental Provider: 2x per year, Dr. Huff,  - Opth provider: [facility], Dr. Mccormack  - Skin/Derm:  discussed ABCDEs of mole surveillance and sunscreen > 30 SPF.  Skin checks regularly.  [facility] in [place], scheduled in February  - Gyn/mmgm/pap: Normal paps in the past, last one was in 2017, Mammo 9/2021, new order placed last year, did not get done, advised to get-she is in agreement  - dexa (women aged 65 years and older and in younger women whose fracture risk is equal to or greater than that of a 65-year old): 10/2021 on fosamax started in 2022, from rheumatologist.  Michelle Saldana-given new script for DEXA today  - Last [place]:  3/2022, due in 10 years  - The USPSTF recommends screening for hepatitis C virus (HCV) infection in adults aged 18 to 79 years:  Treated with antivirals in 2021 with SVR  - Tdap: 2015  - Pneumonia: UTD, last 2022  - Shingles: advised to get at pharmacy  - COVID Vaccine:  Declines 2023  - Flu shot: UTD 2023  - Adv Directives: did not discuss today  - Cholesterol and glucose: given new script

## 2024-01-08 NOTE — ASSESSMENT & PLAN NOTE
Seen by wound center.  Healed.  Using clobetasol for skin breakdown.  Advised 2 weeks then take a break.  Following up with dermatology in 6 weeks.

## 2024-01-18 ENCOUNTER — TELEPHONE (OUTPATIENT)
Dept: PRIMARY CARE | Facility: CLINIC | Age: 69
End: 2024-01-18
Payer: COMMERCIAL

## 2024-01-19 RX ORDER — LIDOCAINE 50 MG/G
PATCH TOPICAL
Qty: 30 PATCH | Refills: 1 | Status: SHIPPED | OUTPATIENT
Start: 2024-01-19 | End: 2024-03-25 | Stop reason: SDUPTHER

## 2024-01-19 NOTE — TELEPHONE ENCOUNTER
"Pharmacy is requesting this refill for lidocaine (LIDODERM) 5 % patch () per patient's request, but med is not listed on patient's med list as \"taking\" from patient's last OV on 24.    If appropriate, please reorder.     --------------------------------------------------------------     Medicine Refill Request    Last Office Visit: 2024   Last Consult Visit: 2023  Last Telemedicine Visit: Visit date not found    Next Appointment: 2024      Current Outpatient Medications:   •  alendronate 70 mg tablet, Take 70 mg by mouth once a week., Disp: , Rfl:   •  aspirin 81 mg enteric coated tablet, Take 81 mg by mouth daily., Disp: , Rfl:   •  busPIRone (BUSPAR) 5 mg tablet, Take 1 tablet (5 mg total) by mouth 3 (three) times a day. 30-60 minutes before a meal, Disp: 270 tablet, Rfl: 1  •  calcium carbonate (OS-LOUISA) 500 mg calcium (1,250 mg) tablet, every 12 hours., Disp: , Rfl:   •  cholecalciferol, vitamin D3, 10 mcg (400 unit) capsule, Take by mouth., Disp: , Rfl:   •  clobetasoL (TEMOVATE) 0.05 % cream, Apply topically 2 (two) times a day., Disp: 45 g, Rfl: 1  •  estradioL (ESTRACE) 0.01 % (0.1 mg/gram) vaginal cream, Apply 1 g into the vagina three times per week at bedtime, Disp: 127.5 g, Rfl: 3  •  gabapentin (NEURONTIN) 100 mg capsule, Take 1 capsule (100 mg total) by mouth nightly., Disp: , Rfl:   •  hydroxychloroquine (PLAQUENIL) 200 mg tablet, TAKE 1 TABLET BY MOUTH ONCE DAILY WITH FOOD, Disp: , Rfl:   •  lidocaine (LIDODERM) 5 % patch, Place 1 patch on the skin daily. Remove & discard patch within 12 hours or as directed by prescriber., Disp: 30 patch, Rfl: 1  •  methylPREDNISolone (MEDROL) 4 mg tablet, Take 4 mg by mouth daily., Disp: , Rfl:   •  MOTEGRITY 2 mg tablet, Take 2 mg by mouth once daily., Disp: , Rfl:   •  multivit with minerals/lutein (MULTIVITAMIN 50 PLUS ORAL), Take by mouth., Disp: , Rfl:   •  polyethylene glycol (MIRALAX) 17 gram/dose powder, Take 17 g by mouth daily. prn, " Disp: 510 g, Rfl:       BP Readings from Last 3 Encounters:   01/08/24 96/62   11/28/23 (!) 94/58   08/17/23 (!) 94/52       Recent Lab results:  Lab Results   Component Value Date    CHOL 193 09/22/2022   ,   Lab Results   Component Value Date    HDL 98 09/22/2022   ,   Lab Results   Component Value Date    LDLCALC 86 09/22/2022   ,   Lab Results   Component Value Date    TRIG 44 09/22/2022        Lab Results   Component Value Date    GLUCOSE 87 04/17/2021   ,   Lab Results   Component Value Date    HGBA1C 4.8 04/17/2021         Lab Results   Component Value Date    CREATININE 0.6 04/17/2021       Lab Results   Component Value Date    TSH 4.56 04/16/2021           Lab Results   Component Value Date    HGBA1C 4.8 04/17/2021

## 2024-02-22 ENCOUNTER — HOSPITAL ENCOUNTER (OUTPATIENT)
Dept: RADIOLOGY | Facility: CLINIC | Age: 69
Discharge: HOME | End: 2024-02-22
Attending: INTERNAL MEDICINE
Payer: COMMERCIAL

## 2024-02-22 DIAGNOSIS — Z12.31 ENCOUNTER FOR SCREENING MAMMOGRAM FOR MALIGNANT NEOPLASM OF BREAST: ICD-10-CM

## 2024-02-22 DIAGNOSIS — Z78.0 MENOPAUSE: ICD-10-CM

## 2024-02-22 PROCEDURE — 77080 DXA BONE DENSITY AXIAL: CPT

## 2024-02-22 PROCEDURE — 77063 BREAST TOMOSYNTHESIS BI: CPT

## 2024-02-22 NOTE — RESULT ENCOUNTER NOTE
Fahad Slater,  Your bone density scan looks stable when compared with your last one.  I recommend following up with you rheumatologist to discuss this further.  Please let me know if you have any questions.  Best,  Dinorah Tolentino MD

## 2024-03-25 RX ORDER — LIDOCAINE 50 MG/G
PATCH TOPICAL
Qty: 30 PATCH | Refills: 1 | Status: SHIPPED | OUTPATIENT
Start: 2024-03-25 | End: 2024-05-23 | Stop reason: SDUPTHER

## 2024-03-25 NOTE — TELEPHONE ENCOUNTER
Medicine Refill Request    Last Office Visit: 1/8/2024   Last Consult Visit: 2/2/2023  Last Telemedicine Visit: Visit date not found    Next Appointment: 4/11/2024      Current Outpatient Medications:     alendronate 70 mg tablet, Take 70 mg by mouth once a week., Disp: , Rfl:     aspirin 81 mg enteric coated tablet, Take 81 mg by mouth daily., Disp: , Rfl:     busPIRone (BUSPAR) 5 mg tablet, Take 1 tablet (5 mg total) by mouth 3 (three) times a day. 30-60 minutes before a meal, Disp: 270 tablet, Rfl: 1    calcium carbonate (OS-LOUISA) 500 mg calcium (1,250 mg) tablet, every 12 hours., Disp: , Rfl:     cholecalciferol, vitamin D3, 10 mcg (400 unit) capsule, Take by mouth., Disp: , Rfl:     clobetasoL (TEMOVATE) 0.05 % cream, Apply topically 2 (two) times a day., Disp: 45 g, Rfl: 1    estradioL (ESTRACE) 0.01 % (0.1 mg/gram) vaginal cream, Apply 1 g into the vagina three times per week at bedtime, Disp: 127.5 g, Rfl: 3    gabapentin (NEURONTIN) 100 mg capsule, Take 1 capsule (100 mg total) by mouth nightly., Disp: , Rfl:     hydroxychloroquine (PLAQUENIL) 200 mg tablet, TAKE 1 TABLET BY MOUTH ONCE DAILY WITH FOOD, Disp: , Rfl:     lidocaine (LIDODERM) 5 % patch, APPLY 1 PATCH ON THE SKIN DAILY REMOVE & DISCARD WITHIN 12 HOURS OR AS DIRECTED BY PRESCRIBER, Disp: 30 patch, Rfl: 1    methylPREDNISolone (MEDROL) 4 mg tablet, Take 4 mg by mouth daily., Disp: , Rfl:     MOTEGRITY 2 mg tablet, Take 2 mg by mouth once daily., Disp: , Rfl:     multivit with minerals/lutein (MULTIVITAMIN 50 PLUS ORAL), Take by mouth., Disp: , Rfl:     polyethylene glycol (MIRALAX) 17 gram/dose powder, Take 17 g by mouth daily. prn, Disp: 510 g, Rfl:       BP Readings from Last 3 Encounters:   01/08/24 96/62   11/28/23 (!) 94/58   08/17/23 (!) 94/52       Recent Lab results:  Lab Results   Component Value Date    CHOL 193 09/22/2022   ,   Lab Results   Component Value Date    HDL 98 09/22/2022   ,   Lab Results   Component Value Date    LDLCALC 86  09/22/2022   ,   Lab Results   Component Value Date    TRIG 44 09/22/2022        Lab Results   Component Value Date    GLUCOSE 87 04/17/2021   ,   Lab Results   Component Value Date    HGBA1C 4.8 04/17/2021         Lab Results   Component Value Date    CREATININE 0.6 04/17/2021       Lab Results   Component Value Date    TSH 4.56 04/16/2021           Lab Results   Component Value Date    HGBA1C 4.8 04/17/2021

## 2024-04-11 ENCOUNTER — OFFICE VISIT (OUTPATIENT)
Dept: PRIMARY CARE | Facility: CLINIC | Age: 69
End: 2024-04-11
Payer: COMMERCIAL

## 2024-04-11 VITALS
DIASTOLIC BLOOD PRESSURE: 64 MMHG | HEART RATE: 72 BPM | SYSTOLIC BLOOD PRESSURE: 102 MMHG | WEIGHT: 98 LBS | OXYGEN SATURATION: 98 % | HEIGHT: 65 IN | RESPIRATION RATE: 16 BRPM | BODY MASS INDEX: 16.33 KG/M2

## 2024-04-11 DIAGNOSIS — L97.511 ULCER OF RIGHT FOOT, LIMITED TO BREAKDOWN OF SKIN (CMS/HCC): Primary | ICD-10-CM

## 2024-04-11 PROCEDURE — 99213 OFFICE O/P EST LOW 20 MIN: CPT | Performed by: INTERNAL MEDICINE

## 2024-04-11 PROCEDURE — 3008F BODY MASS INDEX DOCD: CPT | Performed by: INTERNAL MEDICINE

## 2024-04-11 NOTE — PATIENT INSTRUCTIONS
- I recommend Align probiotic or Lactobacillus-Take it group home between the two doxycycline doses

## 2024-04-11 NOTE — PROGRESS NOTES
Dinorah Tolentino MD    [facility]  [address]  [place] [address]  Phone: [phone]  Fax: [phone]     History of Present Illness     Subjective     Patient ID: Nohemy Machuca is a 68 y.o. female.    Patient presents to follow up on her foot ulcer.    3/19/24 seen by foot and ankle for foot ulcer, healed but  now it has returned.  Now on second course of steroid and doxycycline.  Taking only one of the doxy per day because of loose stool.  Stopped steroid because hard to sleep despite taking in the morning and worried about osteoporosis.  She was recommended to use ketoconazole cream on the foot and has been adding OTC lotrimin to rub in the ulcer itself.  Using triple antibiotic ointment as advised as well as clobetasol.  Feels it does better if it gets air.  Wound specialist wants her to keep boot on however.  She wants to know if I agree with the plan.    On alendronate, working with rheum.         Past Medical/Surgical/Family/Social History       The following have been reviewed and updated as appropriate in this visit:          Past Medical History:   Diagnosis Date    Bladder cancer (CMS/HCC)     Cerebral cavernoma     Constipation     History of tinnitus     Osteoporosis        Past Surgical History:   Procedure Laterality Date    BUNIONECTOMY Bilateral     RECTAL PROLAPSE REPAIR      SUBTOTAL COLECTOMY      TONSILLECTOMY         Family History   Problem Relation Age of Onset    Diverticulitis Biological Mother     Glaucoma Biological Father        Social History     Tobacco Use    Smoking status: Never    Smokeless tobacco: Never   Substance Use Topics    Alcohol use: Yes     Comment: events    Drug use: Never       Patient Care Team:  Dinorah Tolentino MD as PCP - General (Internal Medicine)  Marva Damon MD as Consulting Physician (Neurology)     Allergies and Medications       Allergies   Allergen Reactions    Sulfa (Sulfonamide  "Antibiotics)      Other reaction(s): Unknown       Current Outpatient Medications   Medication Sig Dispense Refill    alendronate 70 mg tablet Take 70 mg by mouth once a week.      aspirin 81 mg enteric coated tablet Take 81 mg by mouth daily.      calcium carbonate (OS-LOUISA) 500 mg calcium (1,250 mg) tablet every 12 hours.      cholecalciferol, vitamin D3, 10 mcg (400 unit) capsule Take by mouth.      clobetasoL (TEMOVATE) 0.05 % cream Apply topically 2 (two) times a day. 45 g 1    estradioL (ESTRACE) 0.01 % (0.1 mg/gram) vaginal cream Apply 1 g into the vagina three times per week at bedtime 127.5 g 3    gabapentin (NEURONTIN) 100 mg capsule Take 1 capsule (100 mg total) by mouth nightly.      hydroxychloroquine (PLAQUENIL) 200 mg tablet TAKE 1 TABLET BY MOUTH ONCE DAILY WITH FOOD      lidocaine (LIDODERM) 5 % patch APPLY 1 PATCH ON THE SKIN DAILY REMOVE & DISCARD WITHIN 12 HOURS OR AS DIRECTED BY PRESCRIBER 30 patch 1    MOTEGRITY 2 mg tablet Take 2 mg by mouth once daily.      multivit with minerals/lutein (MULTIVITAMIN 50 PLUS ORAL) Take by mouth.      polyethylene glycol (MIRALAX) 17 gram/dose powder Take 17 g by mouth daily. prn 510 g     methylPREDNISolone (MEDROL) 4 mg tablet Take 4 mg by mouth daily.       No current facility-administered medications for this visit.          Review of Systems       Review of Systems     Physical Examination       Objective     Vitals:    04/11/24 0800   BP: 102/64   Pulse: 72   Resp: 16   SpO2: 98%       Pulse Readings from Last 5 Encounters:   04/11/24 72   01/08/24 70   11/28/23 70   06/30/23 63   04/19/23 60       Wt Readings from Last 5 Encounters:   04/11/24 44.5 kg (98 lb)   01/08/24 44.5 kg (98 lb)   11/28/23 43.8 kg (96 lb 9.6 oz)   08/17/23 42.3 kg (93 lb 3.2 oz)   06/30/23 41.7 kg (92 lb)       Ht Readings from Last 5 Encounters:   04/11/24 1.651 m (5' 5\")   01/08/24 1.651 m (5' 5\")   11/28/23 1.651 m (5' 5\")   08/17/23 1.651 m (5' 5\")   06/30/23 1.651 m (5' 5\") "       BP Readings from Last 5 Encounters:   04/11/24 102/64   01/08/24 96/62   11/28/23 (!) 94/58   08/17/23 (!) 94/52   06/30/23 104/64       BMI Readings from Last 4 Encounters:   04/11/24 16.31 kg/m²   01/08/24 16.31 kg/m²   11/28/23 16.08 kg/m²   08/17/23 15.51 kg/m²       Physical Exam  Constitutional:       Appearance: Normal appearance.   Eyes:      Conjunctiva/sclera: Conjunctivae normal.   Pulmonary:      Effort: Pulmonary effort is normal.   Abdominal:      General: There is no distension.      Palpations: Abdomen is soft.      Tenderness: There is no abdominal tenderness.   Skin:     Comments: Right foot with ulcer between 3rd and 4th toes.  Small ulcer formation healing on the top of the foot and right lateral malleolus.   Neurological:      Mental Status: She is alert.   Psychiatric:         Mood and Affect: Mood normal.         Behavior: Behavior normal.         Thought Content: Thought content normal.          Laboratory Results     Lab Results   Component Value Date    WBC 4.90 04/17/2021    WBC 8.45 04/16/2021    WBC 6.02 09/18/2020    HGB 13.1 04/17/2021    HGB 14.4 04/16/2021    HGB 13.0 09/18/2020    HCT 38.3 04/17/2021    HCT 40.3 04/16/2021    HCT 38.5 09/18/2020    MCV 94.8 04/17/2021    MCV 91.4 04/16/2021    MCV 99.7 (H) 09/18/2020     04/17/2021     04/16/2021     09/18/2020        Lab Results   Component Value Date    GLUCOSE 87 04/17/2021    GLUCOSE 91 04/17/2021    GLUCOSE 88 04/16/2021    CALCIUM 9.0 04/17/2021    CALCIUM 9.3 04/17/2021    CALCIUM 9.0 04/16/2021     (L) 04/17/2021     04/17/2021     (L) 04/16/2021    K 4.3 04/17/2021    K 4.4 04/17/2021    K 3.9 04/16/2021    CO2 26 04/17/2021    CO2 28 04/17/2021    CO2 25 04/16/2021     04/17/2021     04/17/2021     04/16/2021    BUN 15 04/17/2021    BUN 12 04/17/2021    BUN 15 04/16/2021    CREATININE 0.6 04/17/2021    CREATININE 0.5 (L) 04/17/2021    CREATININE 0.5 (L)  "04/16/2021    ALKPHOS 70 04/16/2021    ALKPHOS 51 09/17/2017    ALKPHOS 67 03/16/2017    AST 38 04/16/2021    AST 31 09/17/2017    AST 32 03/16/2017    ALT 30 04/16/2021    ALT 26 09/17/2017    ALT 25 03/16/2017    BILITOT 0.6 04/16/2021    BILITOT 0.6 09/17/2017    BILITOT 0.4 03/16/2017    ALBUMIN 4.8 04/16/2021    ALBUMIN 4.5 09/17/2017    ALBUMIN 4.4 03/16/2017       Lab Results   Component Value Date    CHOL 193 09/22/2022    CHOL 191 04/17/2021    CHOL 209 (H) 09/18/2017     Lab Results   Component Value Date    HDL 98 09/22/2022    HDL 71 04/17/2021    HDL 81 09/18/2017     Lab Results   Component Value Date    LDLCALC 86 09/22/2022    LDLCALC 113 (H) 04/17/2021    LDLCALC 117 (H) 09/18/2017     Lab Results   Component Value Date    TRIG 44 09/22/2022    TRIG 37 04/17/2021    TRIG 54 09/18/2017       The 10-year ASCVD risk score (Renee BUNN, et al., 2019) is: 3.2%    Values used to calculate the score:      Age: 68 years      Sex: Female      Is Non- : No      Diabetic: No      Tobacco smoker: No      Systolic Blood Pressure: 102 mmHg      Is BP treated: No      HDL Cholesterol: 98 mg/dL      Total Cholesterol: 193 mg/dL    Lab Results   Component Value Date    TSH 4.56 04/16/2021    TSH 2.34 01/04/2021    TSH 1.69 09/17/2017     No results found for: \"FREET4\"      Lab Results   Component Value Date    HGBA1C 4.8 04/17/2021       No results found for: \"CREATUR\"  No results found for: \"MICROALBUR\"  No results found for: \"ALBCRET\"    No results found for: \"HEPCAB\"    Lab Results   Component Value Date    MG 2.0 09/17/2017            Immunization History   Administered Date(s) Administered    Hepatitis B 09/10/2021, 10/10/2021    INFLUENZA VACCINE QUAD ADJUVANTED 65 and OLDER 12/02/2022, 11/28/2023    Influenza Vaccine Quadrivalent 3 Yr And Older 11/01/2016    Influenza Vaccine Quadrivalent Preservative Free 6 Mons and Up 11/08/2019    Influenza, Unspecified 11/01/2016, 11/15/2016, " 11/28/2023    Pneumococcal Conjugate 13-Valent 09/27/2021    Pneumococcal Polysaccharide 10/02/2022    SARS-COV-2 (COVID-19) Unspecified Immunization 03/22/2021    SARS-COV-2 (COVID19) VACCINE, PFIZER MONOVALENT 03/22/2021    Sars-cov-2 (Covid-19) Vaccine, Fabio 04/07/2020, 03/22/2021    Tdap 08/26/2015         Health Maintenance Topics with due status: Overdue       Topic Date Due    Colorectal Cancer Screening Never done    Medicare Annual Wellness Visit Never done    Zoster Vaccine Never done    RSV (60+ years old [shared decision making] or in pregnancy during 32 through 36 weeks) Never done    Hepatitis B Vaccines 03/10/2022    Falls Risk Screening Never done    COVID-19 Vaccine 09/01/2023     Health Maintenance Topics with due status: Not Due       Topic Last Completion Date    DTaP, Tdap, and Td Vaccines 08/26/2015    Depression Screening 04/19/2023    Breast Cancer Screening 02/22/2024    DEXA Scan 02/22/2024     Health Maintenance Topics with due status: Completed       Topic Last Completion Date    Pneumococcal (65 years and older) 10/02/2022    Influenza Vaccine 11/28/2023     Health Maintenance Topics with due status: Aged Out       Topic Date Due    Meningococcal ACWY Aged Out    RSV <20 months Aged Out    HIB Vaccines Aged Out    IPV Vaccines Aged Out    HPV Vaccines Aged Out        Assessment and Plan       Assessment/Plan     Problem List Items Addressed This Visit       Ulcer of right foot, limited to breakdown of skin (CMS/HCC) - Primary    Current Assessment & Plan     Patient now with her second episode of foot ulcers on the right foot including the lateral malleolus and toes.  She is seeing foot and ankle who is managing her care.  Today I recommended doing an ANDER study however patient does not want further workup at this time given the number of doctors she sees.  I advised following the advice of the wound care specialist and taking doxy twice per day (advised probiotic in between to see if  this improves loose stool) and advised not starting and stopping the oral steroid as she had been.  She has follow up with foot and ankle this month.  Follow up with me in 2 months.            Return in about 2 months (around 6/11/2024) for chronic medical probles.    No orders of the defined types were placed in this encounter.             Dinorah Tolentino MD    4/13/2024

## 2024-04-13 NOTE — ASSESSMENT & PLAN NOTE
Patient now with her second episode of foot ulcers on the right foot including the lateral malleolus and toes.  She is seeing foot and ankle who is managing her care.  Today I recommended doing an ANDER study however patient does not want further workup at this time given the number of doctors she sees.  I advised following the advice of the wound care specialist and taking doxy twice per day (advised probiotic in between to see if this improves loose stool) and advised not starting and stopping the oral steroid as she had been.  She has follow up with foot and ankle this month.  Follow up with me in 2 months.

## 2024-05-23 RX ORDER — LIDOCAINE 50 MG/G
PATCH TOPICAL
Qty: 30 PATCH | Refills: 1 | Status: SHIPPED | OUTPATIENT
Start: 2024-05-23

## 2024-05-29 ENCOUNTER — TELEPHONE (OUTPATIENT)
Dept: PRIMARY CARE | Facility: CLINIC | Age: 69
End: 2024-05-29
Payer: COMMERCIAL

## 2024-05-29 NOTE — TELEPHONE ENCOUNTER
Pt LIM requesting to reschedule her appt on June 20th due to her  needing surgery on this day and she will be his .

## 2024-06-27 ENCOUNTER — OFFICE VISIT (OUTPATIENT)
Dept: PRIMARY CARE | Facility: CLINIC | Age: 69
End: 2024-06-27
Payer: COMMERCIAL

## 2024-06-27 VITALS
SYSTOLIC BLOOD PRESSURE: 98 MMHG | WEIGHT: 95.6 LBS | HEIGHT: 65 IN | BODY MASS INDEX: 15.93 KG/M2 | HEART RATE: 53 BPM | DIASTOLIC BLOOD PRESSURE: 58 MMHG | OXYGEN SATURATION: 99 %

## 2024-06-27 DIAGNOSIS — R63.6 LOW WEIGHT: Primary | ICD-10-CM

## 2024-06-27 DIAGNOSIS — C67.9 MALIGNANT NEOPLASM OF URINARY BLADDER, UNSPECIFIED SITE (CMS/HCC): ICD-10-CM

## 2024-06-27 DIAGNOSIS — L97.511 ULCER OF RIGHT FOOT, LIMITED TO BREAKDOWN OF SKIN (CMS/HCC): ICD-10-CM

## 2024-06-27 PROCEDURE — 3008F BODY MASS INDEX DOCD: CPT | Performed by: INTERNAL MEDICINE

## 2024-06-27 PROCEDURE — 99213 OFFICE O/P EST LOW 20 MIN: CPT | Performed by: INTERNAL MEDICINE

## 2024-06-27 RX ORDER — LIDOCAINE 50 MG/G
1 PATCH TOPICAL DAILY
Qty: 30 PATCH | Refills: 1 | Status: SHIPPED | OUTPATIENT
Start: 2024-06-27 | End: 2025-02-18 | Stop reason: ALTCHOICE

## 2024-06-27 NOTE — ASSESSMENT & PLAN NOTE
Reviewed risks of her low BMI.  Pt gets abdominal pain easily since her SBO with partial colectomy.  Follows with Dr. Greenwood of GI on motegrity.  Has seen many nutritionists, does not find them helpful, declines a new referral.  She just started adding a protein shake.  Reviewed trying to have something very small but very calorie dense every 2 hours.  Like 2 tbs of nut butter in addition to meals and protein shake.  FU in 4 months

## 2024-06-27 NOTE — ASSESSMENT & PLAN NOTE
Patient now healed from her second episode of foot ulcers on the right foot including the lateral malleolus and toes.  She is seeing foot and ankle who is managing her care.  Continues to decline an ANDER study- does not want further workup at this time given the number of doctors she sees.

## 2024-06-27 NOTE — Clinical Note
Patient was seen by Dr. Erick lu of 2024 for her bladder cancer-can you please request the note thank you!

## 2024-06-27 NOTE — PROGRESS NOTES
Dinorah Tolentino MD    [facility]  [address]  [place] [address]  Phone: [phone]  Fax: [phone]     History of Present Illness     Subjective     Patient ID: Nohemy Machuca is a 68 y.o. female.    Patient presents to follow up on her foot ulcer which is now healed    Interval history:  Foot wound is healed-declines ANDER  Has had issues in eye-saw Dr. Roger and retina specialist  Thought had tooth infection, saw endodontist-told no infection  Weight still low but stable-appetite is ok.  Still hurts to eat ruffage due to gas.  Taking the motegrity with Dr. Greenwood.  Restarted protein shakes    Medical history:  SBO s/p partial colectomy with chronic abdominal pain and constipation  Rectal prolapse s/p repair  Recurrent foot ulcer  Bladder CA followed by Dr. Suarez-will request note from this spring  Osteoporosis seen by rheumatology Dr. Anne on fosamax  Insomnia:  taking gabapentin-helpful         Past Medical/Surgical/Family/Social History       The following have been reviewed and updated as appropriate in this visit:          Past Medical History:   Diagnosis Date    Bladder cancer (CMS/HCC)     Cerebral cavernoma     Constipation     History of tinnitus     Osteoporosis        Past Surgical History:   Procedure Laterality Date    BUNIONECTOMY Bilateral     RECTAL PROLAPSE REPAIR      SUBTOTAL COLECTOMY      TONSILLECTOMY         Family History   Problem Relation Age of Onset    Diverticulitis Biological Mother     Glaucoma Biological Father        Social History     Tobacco Use    Smoking status: Never    Smokeless tobacco: Never   Substance Use Topics    Alcohol use: Yes     Comment: events    Drug use: Never       Patient Care Team:  Dinorah Tolentino MD as PCP - General (Internal Medicine)  Marva Damon MD as Consulting Physician (Neurology)     Allergies and Medications       Allergies   Allergen Reactions    Sulfa (Sulfonamide  "Antibiotics)      Other reaction(s): Unknown       Current Outpatient Medications   Medication Sig Dispense Refill    alendronate 70 mg tablet Take 70 mg by mouth once a week.      aspirin 81 mg enteric coated tablet Take 81 mg by mouth daily.      cholecalciferol, vitamin D3, 10 mcg (400 unit) capsule Take by mouth.      clobetasoL (TEMOVATE) 0.05 % cream Apply topically 2 (two) times a day. 45 g 1    estradioL (ESTRACE) 0.01 % (0.1 mg/gram) vaginal cream Apply 1 g into the vagina three times per week at bedtime 127.5 g 3    gabapentin (NEURONTIN) 100 mg capsule Take 1 capsule (100 mg total) by mouth nightly.      lidocaine (LIDODERM) 5 % patch APPLY 1 PATCH ON THE SKIN DAILY REMOVE & DISCARD WITHIN 12 HOURS OR AS DIRECTED BY PRESCRIBER 30 patch 1    lidocaine (LIDODERM) 5 % patch Place 1 patch on the skin daily. Remove & discard patch within 12 hours or as directed by prescriber. 30 patch 1    MOTEGRITY 2 mg tablet Take 2 mg by mouth once daily.      multivit with minerals/lutein (MULTIVITAMIN 50 PLUS ORAL) Take by mouth.      polyethylene glycol (MIRALAX) 17 gram/dose powder Take 17 g by mouth daily. prn 510 g      No current facility-administered medications for this visit.          Review of Systems       Review of Systems     Physical Examination       Objective     Vitals:    06/27/24 0937   BP: (!) 98/58   Pulse: (!) 53   SpO2: 99%       Pulse Readings from Last 5 Encounters:   06/27/24 (!) 53   04/11/24 72   01/08/24 70   11/28/23 70   06/30/23 63       Wt Readings from Last 5 Encounters:   06/27/24 43.4 kg (95 lb 9.6 oz)   04/11/24 44.5 kg (98 lb)   01/08/24 44.5 kg (98 lb)   11/28/23 43.8 kg (96 lb 9.6 oz)   08/17/23 42.3 kg (93 lb 3.2 oz)       Ht Readings from Last 5 Encounters:   06/27/24 1.651 m (5' 5\")   04/11/24 1.651 m (5' 5\")   01/08/24 1.651 m (5' 5\")   11/28/23 1.651 m (5' 5\")   08/17/23 1.651 m (5' 5\")       BP Readings from Last 5 Encounters:   06/27/24 (!) 98/58   04/11/24 102/64   01/08/24 " 96/62   11/28/23 (!) 94/58   08/17/23 (!) 94/52       BMI Readings from Last 4 Encounters:   06/27/24 15.91 kg/m²   04/11/24 16.31 kg/m²   01/08/24 16.31 kg/m²   11/28/23 16.08 kg/m²       Physical Exam  Constitutional:       Appearance: Normal appearance.   HENT:      Nose: Nose normal.      Mouth/Throat:      Mouth: Mucous membranes are moist.   Eyes:      Conjunctiva/sclera: Conjunctivae normal.   Cardiovascular:      Rate and Rhythm: Normal rate and regular rhythm.      Heart sounds: Normal heart sounds. No murmur heard.  Pulmonary:      Effort: Pulmonary effort is normal.      Breath sounds: Normal breath sounds. No wheezing.   Abdominal:      Palpations: Abdomen is soft.      Tenderness: There is abdominal tenderness.   Neurological:      Mental Status: She is alert.   Psychiatric:         Mood and Affect: Mood normal.         Behavior: Behavior normal.         Thought Content: Thought content normal.          Laboratory Results     Lab Results   Component Value Date    WBC 4.90 04/17/2021    WBC 8.45 04/16/2021    WBC 6.02 09/18/2020    HGB 13.1 04/17/2021    HGB 14.4 04/16/2021    HGB 13.0 09/18/2020    HCT 38.3 04/17/2021    HCT 40.3 04/16/2021    HCT 38.5 09/18/2020    MCV 94.8 04/17/2021    MCV 91.4 04/16/2021    MCV 99.7 (H) 09/18/2020     04/17/2021     04/16/2021     09/18/2020        Lab Results   Component Value Date    GLUCOSE 87 04/17/2021    GLUCOSE 91 04/17/2021    GLUCOSE 88 04/16/2021    CALCIUM 9.0 04/17/2021    CALCIUM 9.3 04/17/2021    CALCIUM 9.0 04/16/2021     (L) 04/17/2021     04/17/2021     (L) 04/16/2021    K 4.3 04/17/2021    K 4.4 04/17/2021    K 3.9 04/16/2021    CO2 26 04/17/2021    CO2 28 04/17/2021    CO2 25 04/16/2021     04/17/2021     04/17/2021     04/16/2021    BUN 15 04/17/2021    BUN 12 04/17/2021    BUN 15 04/16/2021    CREATININE 0.6 04/17/2021    CREATININE 0.5 (L) 04/17/2021    CREATININE 0.5 (L) 04/16/2021     "ALKPHOS 70 04/16/2021    ALKPHOS 51 09/17/2017    ALKPHOS 67 03/16/2017    AST 38 04/16/2021    AST 31 09/17/2017    AST 32 03/16/2017    ALT 30 04/16/2021    ALT 26 09/17/2017    ALT 25 03/16/2017    BILITOT 0.6 04/16/2021    BILITOT 0.6 09/17/2017    BILITOT 0.4 03/16/2017    ALBUMIN 4.8 04/16/2021    ALBUMIN 4.5 09/17/2017    ALBUMIN 4.4 03/16/2017       Lab Results   Component Value Date    CHOL 193 09/22/2022    CHOL 191 04/17/2021    CHOL 209 (H) 09/18/2017     Lab Results   Component Value Date    HDL 98 09/22/2022    HDL 71 04/17/2021    HDL 81 09/18/2017     Lab Results   Component Value Date    LDLCALC 86 09/22/2022    LDLCALC 113 (H) 04/17/2021    LDLCALC 117 (H) 09/18/2017     Lab Results   Component Value Date    TRIG 44 09/22/2022    TRIG 37 04/17/2021    TRIG 54 09/18/2017       The 10-year ASCVD risk score (Renee DK, et al., 2019) is: 2.9%    Values used to calculate the score:      Age: 68 years      Sex: Female      Is Non- : No      Diabetic: No      Tobacco smoker: No      Systolic Blood Pressure: 98 mmHg      Is BP treated: No      HDL Cholesterol: 98 mg/dL      Total Cholesterol: 193 mg/dL    Lab Results   Component Value Date    TSH 4.56 04/16/2021    TSH 2.34 01/04/2021    TSH 1.69 09/17/2017     No results found for: \"FREET4\"      Lab Results   Component Value Date    HGBA1C 4.8 04/17/2021       No results found for: \"CREATUR\"  No results found for: \"MICROALBUR\"  No results found for: \"ALBCRET\"    No results found for: \"HEPCAB\"    Lab Results   Component Value Date    MG 2.0 09/17/2017            Immunization History   Administered Date(s) Administered    Hepatitis B 09/10/2021, 10/10/2021    INFLUENZA VACCINE QUAD ADJUVANTED 65 and OLDER 12/02/2022, 11/28/2023    Influenza Vaccine Quadrivalent 3 Yr And Older 11/01/2016    Influenza Vaccine Quadrivalent Preservative Free 6 Mons and Up 11/08/2019    Influenza, Unspecified 11/01/2016, 11/15/2016, 11/28/2023    " Pneumococcal Conjugate 13-Valent 09/27/2021    Pneumococcal Polysaccharide 10/02/2022    SARS-COV-2 (COVID-19) Unspecified Immunization 03/22/2021    Sars-cov-2 (Covid-19) Vaccine, Fabio 04/07/2020, 03/22/2021    Tdap 08/26/2015         Health Maintenance Topics with due status: Overdue       Topic Date Due    Colorectal Cancer Screening Never done    Depression Screening Never done    Zoster Vaccine Never done    RSV (60+ years old [shared decision making] or in pregnancy during 32 through 36 weeks) Never done    Hepatitis B Vaccines 03/10/2022    Falls Risk Screening Never done    COVID-19 Vaccine 09/01/2023     Health Maintenance Topics with due status: Not Due       Topic Last Completion Date    DTaP, Tdap, and Td Vaccines 08/26/2015    Breast Cancer Screening 02/22/2024    DEXA Scan 02/22/2024     Health Maintenance Topics with due status: Completed       Topic Last Completion Date    Pneumococcal (65 years and older) 10/02/2022    Influenza Vaccine 11/28/2023     Health Maintenance Topics with due status: Aged Out       Topic Date Due    Meningococcal ACWY Aged Out    RSV <20 months Aged Out    HIB Vaccines Aged Out    IPV Vaccines Aged Out    HPV Vaccines Aged Out        Assessment and Plan       Assessment/Plan     Problem List Items Addressed This Visit       Malignant neoplasm of urinary bladder (CMS/HCC)    Overview     Seen on CT, removed by Dr. Suarez 2/2023         Current Assessment & Plan     Reports she had follow up spring 2024-will request note         Ulcer of right foot, limited to breakdown of skin (CMS/HCC)    Current Assessment & Plan     Patient now healed from her second episode of foot ulcers on the right foot including the lateral malleolus and toes.  She is seeing foot and ankle who is managing her care.  Continues to decline an ANDER study- does not want further workup at this time given the number of doctors she sees.         Relevant Medications    lidocaine (LIDODERM) 5 % patch    Low  weight - Primary    Current Assessment & Plan     Reviewed risks of her low BMI.  Pt gets abdominal pain easily since her SBO with partial colectomy.  Follows with Dr. Greenwood of GI on motegrity.  Has seen many nutritionists, does not find them helpful, declines a new referral.  She just started adding a protein shake.  Reviewed trying to have something very small but very calorie dense every 2 hours.  Like 2 tbs of nut butter in addition to meals and protein shake.  FU in 4 months            Return in about 4 months (around 10/27/2024) for weight.    No orders of the defined types were placed in this encounter.             Dinorah Tolentino MD    6/27/2024

## 2025-02-13 ENCOUNTER — OFFICE VISIT (OUTPATIENT)
Dept: PRIMARY CARE | Facility: CLINIC | Age: 70
End: 2025-02-13
Payer: COMMERCIAL

## 2025-02-13 VITALS
SYSTOLIC BLOOD PRESSURE: 92 MMHG | RESPIRATION RATE: 16 BRPM | DIASTOLIC BLOOD PRESSURE: 68 MMHG | OXYGEN SATURATION: 99 % | HEART RATE: 69 BPM

## 2025-02-13 DIAGNOSIS — B96.89 BACTERIAL SINUSITIS: Primary | ICD-10-CM

## 2025-02-13 DIAGNOSIS — L97.511 ULCER OF RIGHT FOOT, LIMITED TO BREAKDOWN OF SKIN (CMS/HCC): ICD-10-CM

## 2025-02-13 DIAGNOSIS — M06.9 RHEUMATOID ARTHRITIS, INVOLVING UNSPECIFIED SITE, UNSPECIFIED WHETHER RHEUMATOID FACTOR PRESENT (CMS/HCC): ICD-10-CM

## 2025-02-13 DIAGNOSIS — C67.9 MALIGNANT NEOPLASM OF URINARY BLADDER, UNSPECIFIED SITE (CMS/HCC): ICD-10-CM

## 2025-02-13 DIAGNOSIS — J32.9 BACTERIAL SINUSITIS: Primary | ICD-10-CM

## 2025-02-13 DIAGNOSIS — B18.2 CHRONIC HEPATITIS C WITHOUT HEPATIC COMA (CMS/HCC): ICD-10-CM

## 2025-02-13 PROBLEM — K56.609 SMALL BOWEL OBSTRUCTION (CMS/HCC): Status: RESOLVED | Noted: 2022-04-08 | Resolved: 2025-02-13

## 2025-02-13 PROCEDURE — 99213 OFFICE O/P EST LOW 20 MIN: CPT

## 2025-02-13 RX ORDER — AMOXICILLIN AND CLAVULANATE POTASSIUM 875; 125 MG/1; MG/1
1 TABLET, FILM COATED ORAL 2 TIMES DAILY
Qty: 14 TABLET | Refills: 0 | Status: SHIPPED | OUTPATIENT
Start: 2025-02-13 | End: 2025-02-13

## 2025-02-13 RX ORDER — AMOXICILLIN AND CLAVULANATE POTASSIUM 875; 125 MG/1; MG/1
1 TABLET, FILM COATED ORAL 2 TIMES DAILY
Qty: 14 TABLET | Refills: 0 | Status: SHIPPED | OUTPATIENT
Start: 2025-02-13 | End: 2025-02-20

## 2025-02-13 RX ORDER — FLUTICASONE PROPIONATE 50 MCG
1 SPRAY, SUSPENSION (ML) NASAL DAILY
Qty: 16 G | Refills: 1 | Status: SHIPPED | OUTPATIENT
Start: 2025-02-13 | End: 2025-02-13

## 2025-02-13 RX ORDER — FLUTICASONE PROPIONATE 50 MCG
1 SPRAY, SUSPENSION (ML) NASAL DAILY
Qty: 16 G | Refills: 1 | Status: SHIPPED | OUTPATIENT
Start: 2025-02-13

## 2025-02-13 NOTE — PROGRESS NOTES
Reji Martinez DO    [facility]  [address], [address]  [place] [address]  Phone: [phone]  Fax: [phone]     History of Present Illness     Subjective     Patient ID: Nohemy Machuca is a 69 y.o. female.    Patient presents with bilateral ear pain, sinus congestion, sinus pressure, and runny nose.   She has had these symptoms for 2-3 weeks.  Patient denies headache, fevers, chills, poor appetite, nausea, vomiting, diarrhea, chest tightness, chest discomfort, and shortness of breath.   Symptoms have been gradually worsening.  She has tried to alleviate the symptoms with acetaminophen and ibuprofen with minimal relief.     Recent travel: no  Exposure to someone else with similar symptoms: no.     Past Medical/Surgical/Family/Social History       The following have been reviewed and updated as appropriate in this visit:   Allergies  Meds  Problems         Past Medical History:   Diagnosis Date    Bladder cancer (CMS/HCC)     Cerebral cavernoma     Constipation     History of tinnitus     Osteoporosis        Past Surgical History   Procedure Laterality Date    Bunionectomy Bilateral     Rectal prolapse repair      Subtotal colectomy      Tonsillectomy         Family History   Problem Relation Name Age of Onset    Diverticulitis Biological Mother      Glaucoma Biological Father         Social History     Tobacco Use    Smoking status: Never    Smokeless tobacco: Never   Substance Use Topics    Alcohol use: Yes     Comment: events    Drug use: Never       Patient Care Team:  Dinorah Tolentino MD as PCP - General (Internal Medicine)  Marva Damon MD as Consulting Physician (Neurology)       Allergies and Medications       Allergies   Allergen Reactions    Sulfa (Sulfonamide Antibiotics)      Other reaction(s): Unknown         Current Outpatient Medications   Medication Sig Dispense Refill    amoxicillin-pot clavulanate (AUGMENTIN) 875-125 mg per tablet Take 1  "tablet by mouth 2 (two) times a day for 7 days. 14 tablet 0    fluticasone propionate (FLONASE) 50 mcg/actuation nasal spray Administer 1 spray into each nostril daily. 16 g 1    alendronate 70 mg tablet Take 70 mg by mouth once a week.      aspirin 81 mg enteric coated tablet Take 81 mg by mouth daily.      cholecalciferol, vitamin D3, 10 mcg (400 unit) capsule Take by mouth.      clobetasoL (TEMOVATE) 0.05 % cream Apply topically 2 (two) times a day. 45 g 1    estradioL (ESTRACE) 0.01 % (0.1 mg/gram) vaginal cream Apply 1 g into the vagina three times per week at bedtime 127.5 g 3    gabapentin (NEURONTIN) 100 mg capsule Take 1 capsule (100 mg total) by mouth nightly.      lidocaine (LIDODERM) 5 % patch APPLY 1 PATCH ON THE SKIN DAILY REMOVE & DISCARD WITHIN 12 HOURS OR AS DIRECTED BY PRESCRIBER 30 patch 1    lidocaine (LIDODERM) 5 % patch Place 1 patch on the skin daily. Remove & discard patch within 12 hours or as directed by prescriber. 30 patch 1    MOTEGRITY 2 mg tablet Take 2 mg by mouth once daily.      multivit with minerals/lutein (MULTIVITAMIN 50 PLUS ORAL) Take by mouth.      polyethylene glycol (MIRALAX) 17 gram/dose powder Take 17 g by mouth daily. prn 510 g      No current facility-administered medications for this visit.          Review of Systems       Review of Systems   All other systems reviewed and are negative.       Physical Examination       Objective     Vitals:    02/13/25 1002   BP: 92/68   Pulse: 69   Resp: 16   SpO2: 99%       Pulse Readings from Last 5 Encounters:   02/13/25 69   06/27/24 (!) 53   04/11/24 72   01/08/24 70   11/28/23 70       Wt Readings from Last 5 Encounters:   06/27/24 43.4 kg (95 lb 9.6 oz)   04/11/24 44.5 kg (98 lb)   01/08/24 44.5 kg (98 lb)   11/28/23 43.8 kg (96 lb 9.6 oz)   08/17/23 42.3 kg (93 lb 3.2 oz)       Ht Readings from Last 5 Encounters:   06/27/24 1.651 m (5' 5\")   04/11/24 1.651 m (5' 5\")   01/08/24 1.651 m (5' 5\")   11/28/23 1.651 m (5' 5\") " "  08/17/23 1.651 m (5' 5\")       BP Readings from Last 5 Encounters:   02/13/25 92/68   06/27/24 (!) 98/58   04/11/24 102/64   01/08/24 96/62   11/28/23 (!) 94/58       BMI Readings from Last 4 Encounters:   06/27/24 15.91 kg/m²   04/11/24 16.31 kg/m²   01/08/24 16.31 kg/m²   11/28/23 16.08 kg/m²       Physical Exam  Vitals reviewed.   Constitutional:       General: She is not in acute distress.     Appearance: Normal appearance. She is not ill-appearing.   HENT:      Head: Normocephalic and atraumatic.      Right Ear: Tympanic membrane, ear canal and external ear normal. There is no impacted cerumen.      Left Ear: Ear canal and external ear normal. There is no impacted cerumen.      Ears:      Comments: Moderate effusion present in left ear     Nose: Congestion present.      Comments: Maxillary sinus tenderness present, worse on the left     Mouth/Throat:      Pharynx: No oropharyngeal exudate or posterior oropharyngeal erythema.   Eyes:      Pupils: Pupils are equal, round, and reactive to light.   Cardiovascular:      Rate and Rhythm: Normal rate and regular rhythm.      Pulses: Normal pulses.      Heart sounds: Normal heart sounds. No murmur heard.     No friction rub. No gallop.   Pulmonary:      Effort: Pulmonary effort is normal. No respiratory distress.      Breath sounds: Normal breath sounds. No wheezing or rales.   Chest:      Chest wall: No tenderness.   Neurological:      General: No focal deficit present.      Mental Status: She is alert and oriented to person, place, and time. Mental status is at baseline.          Laboratory Results     Lab Results   Component Value Date    WBC 4.90 04/17/2021    WBC 8.45 04/16/2021    WBC 6.02 09/18/2020    HGB 13.1 04/17/2021    HGB 14.4 04/16/2021    HGB 13.0 09/18/2020    HCT 38.3 04/17/2021    HCT 40.3 04/16/2021    HCT 38.5 09/18/2020    MCV 94.8 04/17/2021    MCV 91.4 04/16/2021    MCV 99.7 (H) 09/18/2020     04/17/2021     04/16/2021     " "09/18/2020        Lab Results   Component Value Date    GLUCOSE 87 04/17/2021    GLUCOSE 91 04/17/2021    GLUCOSE 88 04/16/2021    CALCIUM 9.0 04/17/2021    CALCIUM 9.3 04/17/2021    CALCIUM 9.0 04/16/2021     (L) 04/17/2021     04/17/2021     (L) 04/16/2021    K 4.3 04/17/2021    K 4.4 04/17/2021    K 3.9 04/16/2021    CO2 26 04/17/2021    CO2 28 04/17/2021    CO2 25 04/16/2021     04/17/2021     04/17/2021     04/16/2021    BUN 15 04/17/2021    BUN 12 04/17/2021    BUN 15 04/16/2021    CREATININE 0.6 04/17/2021    CREATININE 0.5 (L) 04/17/2021    CREATININE 0.5 (L) 04/16/2021    ALKPHOS 70 04/16/2021    ALKPHOS 51 09/17/2017    ALKPHOS 67 03/16/2017    AST 38 04/16/2021    AST 31 09/17/2017    AST 32 03/16/2017    ALT 30 04/16/2021    ALT 26 09/17/2017    ALT 25 03/16/2017    BILITOT 0.6 04/16/2021    BILITOT 0.6 09/17/2017    BILITOT 0.4 03/16/2017    ALBUMIN 4.8 04/16/2021    ALBUMIN 4.5 09/17/2017    ALBUMIN 4.4 03/16/2017       Lab Results   Component Value Date    CHOL 193 09/22/2022    CHOL 191 04/17/2021    CHOL 209 (H) 09/18/2017     Lab Results   Component Value Date    HDL 98 09/22/2022    HDL 71 04/17/2021    HDL 81 09/18/2017     Lab Results   Component Value Date    LDLCALC 86 09/22/2022    LDLCALC 113 (H) 04/17/2021    LDLCALC 117 (H) 09/18/2017     Lab Results   Component Value Date    TRIG 44 09/22/2022    TRIG 37 04/17/2021    TRIG 54 09/18/2017       The 10-year ASCVD risk score (Renee BUNN, et al., 2019) is: 3%    Values used to calculate the score:      Age: 69 years      Sex: Female      Is Non- : No      Diabetic: No      Tobacco smoker: No      Systolic Blood Pressure: 92 mmHg      Is BP treated: No      HDL Cholesterol: 98 mg/dL      Total Cholesterol: 193 mg/dL    Lab Results   Component Value Date    TSH 4.56 04/16/2021    TSH 2.34 01/04/2021    TSH 1.69 09/17/2017     No results found for: \"FREET4\"      Lab Results   Component " "Value Date    HGBA1C 4.8 04/17/2021       No results found for: \"CREATUR\"  No results found for: \"MICROALBUR\"  No results found for: \"ALBCRET\"    No results found for: \"MICROALBCREA\"    No results found for: \"PSA\"    No results found for: \"HEPCAB\"    Lab Results   Component Value Date    MG 2.0 09/17/2017        Immunization History   Administered Date(s) Administered    Hepatitis B 09/10/2021, 10/10/2021    INFLUENZA VACCINE QUAD ADJUVANTED 65 and OLDER 12/02/2022, 11/28/2023    Influenza Vaccine Quadrivalent 3 Yr And Older 11/01/2016    Influenza Vaccine Quadrivalent Preservative Free 6 Mons and Up 11/08/2019    Influenza, Unspecified 11/01/2016, 11/15/2016, 11/28/2023    Pneumococcal Conjugate 13-Valent 09/27/2021    Pneumococcal Polysaccharide 10/02/2022    SARS-COV-2 (COVID-19) Unspecified Immunization 03/22/2021    Sars-cov-2 (Covid-19) Vaccine, Fabio 04/07/2020, 03/22/2021    Tdap 08/26/2015         Health Maintenance Topics with due status: Overdue       Topic Date Due    Advance Care Planning Never done    Colorectal Cancer Screening Never done    Depression Screening Never done    Zoster Vaccine Never done    RSV Vaccine Never done    Hepatitis B Vaccines 03/10/2022    Falls Risk Screening Never done    Influenza Vaccine 08/01/2024    COVID-19 Vaccine 09/01/2024     Health Maintenance Topics with due status: Not Due       Topic Last Completion Date    DTaP, Tdap, and Td Vaccines 08/26/2015    Breast Cancer Screening 02/22/2024    DEXA Scan 02/22/2024     Health Maintenance Topics with due status: Completed       Topic Last Completion Date    Pneumococcal (65 years and older) 10/02/2022     Health Maintenance Topics with due status: Aged Out       Topic Date Due    Meningococcal ACWY Aged Out    RSV <20 months Aged Out    HIB Vaccines Aged Out    IPV Vaccines Aged Out    Meningococcal B Aged Out    HPV Vaccines Aged Out        Assessment and Plan       Assessment/Plan     Problem List Items Addressed This " Visit       Chronic hepatitis C without hepatic coma (CMS/HCC)    Current Assessment & Plan     Stable         Rheumatoid arthritis (CMS/HCC)    Current Assessment & Plan     Stable         Malignant neoplasm of urinary bladder (CMS/HCC)    Overview     Seen on CT, removed by Dr. Suarez 2/2023         Current Assessment & Plan     Stable         Ulcer of right foot, limited to breakdown of skin (CMS/HCC)    Current Assessment & Plan     Stable         Bacterial sinusitis - Primary    Current Assessment & Plan     Symptoms and exam findings consistent with bacterial sinusitis.    Prescribed Augmentin and Flonase.    Patient will follow-up with ENT if symptoms persist.         Relevant Medications    amoxicillin-pot clavulanate (AUGMENTIN) 875-125 mg per tablet       No follow-ups on file.    No orders of the defined types were placed in this encounter.             Reji Martinez,     2/13/2025     Some or all of this note has been written using voice recognition software.    Please excuse any typographical errors.

## 2025-02-13 NOTE — ASSESSMENT & PLAN NOTE
Patient with VVIR pacer, remote transmission reviewed today documented  1 labeled NSVT event  on 5/17/22 @ 23:43 pm.  Event  32 beats long  with rate 170 BPM lasting  22 seconds,  these events are known to patient.                                                       Symptoms and exam findings consistent with bacterial sinusitis.    Prescribed Augmentin and Flonase.    Patient will follow-up with ENT if symptoms persist.

## 2025-02-18 ENCOUNTER — OFFICE VISIT (OUTPATIENT)
Dept: PRIMARY CARE | Facility: CLINIC | Age: 70
End: 2025-02-18
Payer: COMMERCIAL

## 2025-02-18 ENCOUNTER — NURSE TRIAGE (OUTPATIENT)
Dept: PRIMARY CARE | Facility: CLINIC | Age: 70
End: 2025-02-18

## 2025-02-18 VITALS
HEIGHT: 65 IN | WEIGHT: 90.4 LBS | DIASTOLIC BLOOD PRESSURE: 82 MMHG | RESPIRATION RATE: 16 BRPM | BODY MASS INDEX: 15.06 KG/M2 | SYSTOLIC BLOOD PRESSURE: 108 MMHG

## 2025-02-18 DIAGNOSIS — H69.92 EUSTACHIAN TUBE DYSFUNCTION, LEFT: Primary | Chronic | ICD-10-CM

## 2025-02-18 DIAGNOSIS — M06.9 RHEUMATOID ARTHRITIS, INVOLVING UNSPECIFIED SITE, UNSPECIFIED WHETHER RHEUMATOID FACTOR PRESENT (CMS/HCC): Chronic | ICD-10-CM

## 2025-02-18 DIAGNOSIS — C67.9 MALIGNANT NEOPLASM OF URINARY BLADDER, UNSPECIFIED SITE (CMS/HCC): Chronic | ICD-10-CM

## 2025-02-18 DIAGNOSIS — B18.2 CHRONIC HEPATITIS C WITHOUT HEPATIC COMA (CMS/HCC): Chronic | ICD-10-CM

## 2025-02-18 PROBLEM — U07.1 COVID-19 VIRUS INFECTION: Status: RESOLVED | Noted: 2022-01-28 | Resolved: 2025-02-18

## 2025-02-18 PROBLEM — L97.511 ULCER OF RIGHT FOOT, LIMITED TO BREAKDOWN OF SKIN (CMS/HCC): Chronic | Status: ACTIVE | Noted: 2023-11-28

## 2025-02-18 PROBLEM — J32.9 BACTERIAL SINUSITIS: Status: RESOLVED | Noted: 2025-02-13 | Resolved: 2025-02-18

## 2025-02-18 PROBLEM — L97.511 ULCER OF RIGHT FOOT, LIMITED TO BREAKDOWN OF SKIN (CMS/HCC): Chronic | Status: RESOLVED | Noted: 2023-11-28 | Resolved: 2025-02-18

## 2025-02-18 PROBLEM — N94.89 VAGINAL BURNING: Status: RESOLVED | Noted: 2022-03-21 | Resolved: 2025-02-18

## 2025-02-18 PROBLEM — R05.2 SUBACUTE COUGH: Status: RESOLVED | Noted: 2023-08-17 | Resolved: 2025-02-18

## 2025-02-18 PROBLEM — M62.9 DISORDER OF MUSCLE: Status: RESOLVED | Noted: 2022-02-01 | Resolved: 2025-02-18

## 2025-02-18 PROBLEM — B96.89 BACTERIAL SINUSITIS: Status: RESOLVED | Noted: 2025-02-13 | Resolved: 2025-02-18

## 2025-02-18 PROCEDURE — 99214 OFFICE O/P EST MOD 30 MIN: CPT | Performed by: FAMILY MEDICINE

## 2025-02-18 PROCEDURE — 3008F BODY MASS INDEX DOCD: CPT | Performed by: FAMILY MEDICINE

## 2025-02-18 RX ORDER — NAPROXEN 500 MG/1
500 TABLET ORAL 2 TIMES DAILY WITH MEALS
Qty: 30 TABLET | Refills: 0 | Status: SHIPPED | OUTPATIENT
Start: 2025-02-18 | End: 2025-03-18 | Stop reason: SDUPTHER

## 2025-02-18 ASSESSMENT — ENCOUNTER SYMPTOMS
NECK STIFFNESS: 0
DIZZINESS: 0
ABDOMINAL DISTENTION: 0
HEADACHES: 0
CONFUSION: 0
NUMBNESS: 0
FREQUENCY: 0
SHORTNESS OF BREATH: 0
MYALGIAS: 0
APNEA: 0
ARTHRALGIAS: 0
VOMITING: 0
POLYPHAGIA: 0
SEIZURES: 0
NECK PAIN: 0
DIFFICULTY URINATING: 0
ACTIVITY CHANGE: 0
ADENOPATHY: 0
FEVER: 0
DYSPHORIC MOOD: 0
LIGHT-HEADEDNESS: 0
POLYDIPSIA: 0
EYE REDNESS: 0
CHILLS: 0
SINUS PAIN: 0
RHINORRHEA: 0
DYSURIA: 0
COUGH: 0
NERVOUS/ANXIOUS: 0
JOINT SWELLING: 0
CONSTIPATION: 0
SINUS PRESSURE: 0
WEAKNESS: 0
BLOOD IN STOOL: 0
CHEST TIGHTNESS: 0
BACK PAIN: 0
BRUISES/BLEEDS EASILY: 0
EYE PAIN: 0
ABDOMINAL PAIN: 0
VOICE CHANGE: 0
SORE THROAT: 0
DECREASED CONCENTRATION: 0
FATIGUE: 0
HEMATURIA: 0
NAUSEA: 0
PALPITATIONS: 0
FLANK PAIN: 0
WOUND: 0
WHEEZING: 0
PHOTOPHOBIA: 0

## 2025-02-18 NOTE — TELEPHONE ENCOUNTER
"Patient returned missed call stating she's experiencing pain in her left ear (around a 3-4/10) during triage call.      At times she's felt the pain radiating down the left side of her neck and the back of left jaw but not currently.  She has been taking antibiotics (saw Dr. Martinez for this on 2/13/25) but \"thinks she needs something different/stronger because she doesn't feel this medicine is working.\"  Symptoms have remained the same since onset EXCEPT this morning the pain in her left ear was around 9/10 but has since diminished to a 3-4/10 currently.      Denies headache, fever, chest pain, radiating pain to shoulders, arm or back; denies neck swelling, neck stiffness, SOB, nausea, vomiting, dizziness, lightheadedness, weakness, numbness, tingling, injuries to the site, drainage from the ear; or any other symptoms at this time and patient IS able to touch chin to chest w/o difficulty or pain.  Also denies any personal history of cardiac issues.    NOTE:  Ear pain worsens off and on from 3-8 out of 10.  Neck/jaw pain not currently present.    DISPOSITIONS:    MODERATE Neck Pain:  See Within 3 Days in Office  SEVERE Earache Pain:  Go to Office Now    Reason for Disposition   SEVERE earache pain (e.g., excruciating)   MODERATE neck pain (e.g., interferes with normal activities like work or school) and present > 3 days    Protocols used: Earache-Adult-[place], Neck Pain or Stiffness-Adult-[place]    Patient already has a 3:40pm appointment scheduled with Dr. Cruz for today.  Refer to ED for worsening symptoms.  Patient verbalizes understanding and has no questions at this time.    Will inform Dr. Cruz of patient's symptoms and if other/preferred recommendations, will advise patient accordingly.    Please advise.              "

## 2025-02-18 NOTE — TELEPHONE ENCOUNTER
Called patient to further triage symptoms of ear/neck/jaw pain.      Patient has a previously scheduled appointment for today at 3:40pm with Dr. Cruz for evaluation.  No answer; left message on VM to call office back to determine if appointment scheduled for today is appropriate versus other recommendations.

## 2025-02-18 NOTE — ASSESSMENT & PLAN NOTE
Persistent over the last 3-4 weeks with sinusitis  Underlying Tmj and is without her dental mouth ayla  Will continue and complete course of Augmentin 875 mg one po bid   Flonase has not helped and stopped  Unable to tolerate oral prednisone due to side effects previously  Trial of Naprosyn 500 mg one po bid   She will call if having any persistent or worsening left ear pressure or Tmj pain

## 2025-02-18 NOTE — PROGRESS NOTES
.    69 year old presents for left ear/jaw pain for the last 3-4 weeks  Was seen 2/13 /25 in our office  Dx with sinusitis and was rxed Augmentin and flonase which she is on her last day  She relates at times she's felt the pain radiating down the left side of her neck and the back of left jaw  Less ear pain and has been improving but has not resolved   Denies fevers chills   Some nasal congestion  Hx of chronic tinnitus a little louders  No vertigo   Hx of bruxism and having new dental guard made and has not had  Denies tooth pain        The following have been reviewed and updated as appropriate in this visit:   Allergies  Meds  Problems         Past Medical History:   Diagnosis Date    Bladder cancer (CMS/HCC)     Cerebral cavernoma     Constipation     History of tinnitus     Osteoporosis        Past Surgical History   Procedure Laterality Date    Bunionectomy Bilateral     Rectal prolapse repair      Subtotal colectomy      Tonsillectomy         Family History   Problem Relation Name Age of Onset    Diverticulitis Biological Mother      Glaucoma Biological Father         Social History     Tobacco Use    Smoking status: Never    Smokeless tobacco: Never   Substance Use Topics    Alcohol use: Yes     Comment: events    Drug use: Never       Allergies   Allergen Reactions    Sulfa (Sulfonamide Antibiotics)      Other reaction(s): Unknown       Current Outpatient Medications   Medication Sig Dispense Refill    alendronate 70 mg tablet Take 70 mg by mouth once a week.      amoxicillin-pot clavulanate (AUGMENTIN) 875-125 mg per tablet Take 1 tablet by mouth 2 (two) times a day for 7 days. 14 tablet 0    aspirin 81 mg enteric coated tablet Take 81 mg by mouth daily.      cholecalciferol, vitamin D3, 10 mcg (400 unit) capsule Take by mouth.      clobetasoL (TEMOVATE) 0.05 % cream Apply topically 2 (two) times a day. 45 g 1    estradioL (ESTRACE) 0.01 % (0.1 mg/gram) vaginal cream Apply 1 g into the vagina three  times per week at bedtime 127.5 g 3    fluticasone propionate (FLONASE) 50 mcg/actuation nasal spray Administer 1 spray into each nostril daily. 16 g 1    gabapentin (NEURONTIN) 100 mg capsule Take 1 capsule (100 mg total) by mouth nightly.      lidocaine (LIDODERM) 5 % patch APPLY 1 PATCH ON THE SKIN DAILY REMOVE & DISCARD WITHIN 12 HOURS OR AS DIRECTED BY PRESCRIBER 30 patch 1    MOTEGRITY 2 mg tablet Take 2 mg by mouth once daily.      multivit with minerals/lutein (MULTIVITAMIN 50 PLUS ORAL) Take by mouth.      naproxen (NAPROSYN) 500 mg tablet Take 1 tablet (500 mg total) by mouth 2 (two) times a day with meals. 30 tablet 0    polyethylene glycol (MIRALAX) 17 gram/dose powder Take 17 g by mouth daily. prn 510 g      No current facility-administered medications for this visit.       Review of Systems   Constitutional:  Negative for activity change, chills, fatigue and fever.   HENT:  Negative for congestion, ear discharge, ear pain, hearing loss, postnasal drip, rhinorrhea, sinus pressure, sinus pain, sneezing, sore throat, tinnitus and voice change.         Left ear pain and pressure  Left tmj pain   Clenching opening her mouth   Eyes:  Negative for photophobia, pain, redness and visual disturbance.   Respiratory:  Negative for apnea, cough, chest tightness, shortness of breath and wheezing.    Cardiovascular:  Negative for chest pain, palpitations and leg swelling.   Gastrointestinal:  Negative for abdominal distention, abdominal pain, blood in stool, constipation, nausea and vomiting.   Endocrine: Negative for cold intolerance, heat intolerance, polydipsia, polyphagia and polyuria.   Genitourinary:  Negative for decreased urine volume, difficulty urinating, dyspareunia, dysuria, flank pain, frequency, hematuria, menstrual problem, pelvic pain, vaginal bleeding, vaginal discharge and vaginal pain.   Musculoskeletal:  Negative for arthralgias, back pain, gait problem, joint swelling, myalgias, neck pain and  "neck stiffness.   Skin:  Negative for pallor, rash and wound.   Allergic/Immunologic: Negative for environmental allergies and food allergies.   Neurological:  Negative for dizziness, seizures, weakness, light-headedness, numbness and headaches.   Hematological:  Negative for adenopathy. Does not bruise/bleed easily.   Psychiatric/Behavioral:  Negative for behavioral problems, confusion, decreased concentration and dysphoric mood. The patient is not nervous/anxious.        Objective     Vitals:    02/18/25 1542   BP: 108/82   Resp: 16     Vitals:    02/18/25 1542   BP: 108/82   BP Location: Right upper arm   Patient Position: Sitting   Resp: 16   Weight: 41 kg (90 lb 6.4 oz)   Height: 1.651 m (5' 5\")       Physical Exam  Vitals and nursing note reviewed.   Constitutional:       Appearance: Normal appearance.   HENT:      Head: Normocephalic and atraumatic.      Right Ear: Tympanic membrane, ear canal and external ear normal.      Left Ear: Ear canal and external ear normal. A middle ear effusion is present.      Ears:      Comments: Left tmj pain with clenching and opening her mouth     Nose: Nose normal.   Eyes:      Extraocular Movements: Extraocular movements intact.      Conjunctiva/sclera: Conjunctivae normal.      Pupils: Pupils are equal, round, and reactive to light.   Cardiovascular:      Rate and Rhythm: Normal rate and regular rhythm.      Pulses: Normal pulses.   Pulmonary:      Effort: Pulmonary effort is normal.      Breath sounds: Normal breath sounds.   Abdominal:      General: Abdomen is flat. Bowel sounds are normal.      Palpations: Abdomen is soft.   Neurological:      Mental Status: She is alert.         Lab Results   Component Value Date    WBC 4.90 04/17/2021    HGB 13.1 04/17/2021    HCT 38.3 04/17/2021    MCV 94.8 04/17/2021     04/17/2021         Chemistry        Component Value Date/Time     (L) 04/17/2021 1235    K 4.3 04/17/2021 1235     04/17/2021 1235    CO2 26 " "04/17/2021 1235    BUN 15 04/17/2021 1235    CREATININE 0.6 04/17/2021 1235        Component Value Date/Time    CALCIUM 9.0 04/17/2021 1235    ALKPHOS 70 04/16/2021 1812    AST 38 04/16/2021 1812    ALT 30 04/16/2021 1812    BILITOT 0.6 04/16/2021 1812            Lab Results   Component Value Date    CHOL 193 09/22/2022    CHOL 191 04/17/2021    CHOL 209 (H) 09/18/2017     Lab Results   Component Value Date    HDL 98 09/22/2022    HDL 71 04/17/2021    HDL 81 09/18/2017     Lab Results   Component Value Date    LDLCALC 86 09/22/2022    LDLCALC 113 (H) 04/17/2021    LDLCALC 117 (H) 09/18/2017     Lab Results   Component Value Date    TRIG 44 09/22/2022    TRIG 37 04/17/2021    TRIG 54 09/18/2017     No results found for: \"CHOLHDL\"    Lab Results   Component Value Date    TSH 4.56 04/16/2021       Lab Results   Component Value Date    HGBA1C 4.8 04/17/2021       No results found for: \"HAV\", \"HEPAIGM\", \"HEPBIGM\", \"HEPBCAB\", \"HBEAG\", \"HEPCAB\"    No results found for: \"MICROALBUR\", \"[id number]\"    Assessment/Plan     1. Eustachian tube dysfunction, left    2. Chronic hepatitis C without hepatic coma (CMS/HCC)    3. Malignant neoplasm of urinary bladder, unspecified site (CMS/HCC)    4. Rheumatoid arthritis, involving unspecified site, unspecified whether rheumatoid factor present (CMS/HCC)        Problem List Items Addressed This Visit       Chronic hepatitis C without hepatic coma (CMS/HCC) (Chronic)    Current Assessment & Plan     Stable         Rheumatoid arthritis (CMS/HCC) (Chronic)    Current Assessment & Plan     Stable         Relevant Medications    naproxen (NAPROSYN) 500 mg tablet    Malignant neoplasm of urinary bladder (CMS/HCC) (Chronic)    Overview     Seen on CT, removed by Dr. Suarez 2/2023         Current Assessment & Plan     Stable         Eustachian tube dysfunction, left - Primary (Chronic)    Current Assessment & Plan     Persistent over the last 3-4 weeks with sinusitis  Underlying Tmj and is " without her dental mouth ayla  Will continue and complete course of Augmentin 875 mg one po bid   Flonase has not helped and stopped  Unable to tolerate oral prednisone due to side effects previously  Trial of Naprosyn 500 mg one po bid   She will call if having any persistent or worsening left ear pressure or Tmj pain               Return in about 1 month (around 3/18/2025) for Next scheduled follow-up.    No orders of the defined types were placed in this encounter.          Ulises Cruz, DO  2/18/2025

## 2025-03-18 ENCOUNTER — OFFICE VISIT (OUTPATIENT)
Dept: PRIMARY CARE | Facility: CLINIC | Age: 70
End: 2025-03-18
Payer: COMMERCIAL

## 2025-03-18 VITALS
HEIGHT: 65 IN | SYSTOLIC BLOOD PRESSURE: 98 MMHG | RESPIRATION RATE: 16 BRPM | BODY MASS INDEX: 13.99 KG/M2 | OXYGEN SATURATION: 98 % | DIASTOLIC BLOOD PRESSURE: 64 MMHG | WEIGHT: 84 LBS | HEART RATE: 55 BPM

## 2025-03-18 DIAGNOSIS — Z00.00 ENCOUNTER FOR GENERAL ADULT MEDICAL EXAMINATION WITHOUT ABNORMAL FINDINGS: Primary | ICD-10-CM

## 2025-03-18 DIAGNOSIS — E43 SEVERE PROTEIN-CALORIE MALNUTRITION (CMS/HCC): ICD-10-CM

## 2025-03-18 DIAGNOSIS — Z12.31 ENCOUNTER FOR SCREENING MAMMOGRAM FOR MALIGNANT NEOPLASM OF BREAST: ICD-10-CM

## 2025-03-18 PROCEDURE — 90471 IMMUNIZATION ADMIN: CPT | Performed by: INTERNAL MEDICINE

## 2025-03-18 PROCEDURE — 90662 IIV NO PRSV INCREASED AG IM: CPT | Performed by: INTERNAL MEDICINE

## 2025-03-18 PROCEDURE — 3008F BODY MASS INDEX DOCD: CPT | Performed by: INTERNAL MEDICINE

## 2025-03-18 PROCEDURE — 99397 PER PM REEVAL EST PAT 65+ YR: CPT | Mod: 25 | Performed by: INTERNAL MEDICINE

## 2025-03-18 RX ORDER — NAPROXEN 500 MG/1
500 TABLET ORAL
Qty: 30 TABLET | Refills: 0 | Status: SHIPPED | OUTPATIENT
Start: 2025-03-18 | End: 2025-04-16

## 2025-03-18 ASSESSMENT — ENCOUNTER SYMPTOMS
PALPITATIONS: 0
FEVER: 0
SHORTNESS OF BREATH: 0
CHEST TIGHTNESS: 0
CONSTIPATION: 0
FREQUENCY: 0
DIARRHEA: 0
DYSPHORIC MOOD: 0
HEADACHES: 0
ADENOPATHY: 0
NERVOUS/ANXIOUS: 0
ABDOMINAL PAIN: 0

## 2025-03-18 ASSESSMENT — PATIENT HEALTH QUESTIONNAIRE - PHQ9: SUM OF ALL RESPONSES TO PHQ9 QUESTIONS 1 & 2: 0

## 2025-03-18 NOTE — ASSESSMENT & PLAN NOTE
6 pound weight loss within the last month.  She reports difficulties with dentures and ulcers in her mouth leading to be difficult to chew.  She has history of gastrointestinal diagnoses make it hard to digest certain foods.  I discussed my concerns with her.  She has seen nutritionist multiple times and feels they are not helpful.  I advised doing a CT chest abdomen and pelvis given this extreme weight loss for BMI and she declines-expressed concern for cancer.  I advised following up in another month for a weight check but she declines.  She is willing to see me in 2 months.  I stressed that I am worried about illness in the setting of this very low BMI and stressed the importance of calorie dense foods.  Encouraged protein shakes, oatmeal mixed with not better etc.

## 2025-03-18 NOTE — PROGRESS NOTES
Dinorah Tolentino MD    [facility] - Family Medicine  [address]  [place] [address]  Phone: [phone]  Fax: [phone]     History of Present Illness     Subjective     Patient ID: Nohemy Machuca is a 69 y.o. female.    Pt presents for a Physical.    - Exercise:  Nothing intentional.  Taking care of two grandchildren.  Goes to [place], doing exercises with a PT.  Discussed importance of keeping up strength and muscle mass  - Diet:  some limitations as she has tried different diets for her GI symptoms.  Doing better now but has painful dentures and mouth sores.  Difficult to eat solids. Declines further nutritionists.  Discussed calorie dense foods, protein shakes.  - Alcohol use: none  - Smoking: none  - Drugs: none  - Mental health: stress but mood is overall good  - No falls in the last year-balance is fine  - Cognition:  No concerns  - Dental Provider: 2x per year, Dr. Huff,  - Opth provider: New contacts, [facility], Dr. Roger  - Skin/Derm:  discussed ABCDEs of mole surveillance and sunscreen > 30 SPF.  Skin checks regularly.  [facility] in [place]  - Gyn/mmgm/pap: Normal paps in the past, last one was in 2017, Mammo 2/2024, new order placed   - dexa (women aged 65 years and older and in younger women whose fracture risk is equal to or greater than that of a 65-year old): 2/2024.  Osteoporosis.  Treatment with Dr. Esquivel.  Fosamax.  - Last [place]:  3/2022, due in 10 years.  Seeing GI in April  - The USPSTF recommends screening for hepatitis C virus (HCV) infection in adults aged 18 to 79 years:  Treated with antivirals in 2021 with SVR  - Tdap: 2015  - Pneumonia: UTD, last 2022  - Shingles: advised to get at pharmacy  - RSV:  advised at the pharmacy  - COVID Vaccine:  Declines 2024  - Flu shot: Given today  - Adv Directives: did not discuss today  - Cholesterol and glucose: given new script    Seeing Dr. Greenwood end of April.  6 lbs down since February  Ate  yesterday but can't tell you what it was-focuses on feeding  and grandchildren  No drenching sweats  Issues with mouth pain and dentures, painful  One protein shake per day, eating some baby food because can't chew      Medical history:  SBO s/p partial colectomy with chronic abdominal pain and constipation  Rectal prolapse s/p repair  Recurrent foot ulcer  Bladder CA followed by Dr. Suarez  Osteoporosis seen by rheumatology Dr. Esquivel on fosamax  Insomnia:  taking gabapentin-helpful  Severe malnutrition due to abdominal pain/bloating, dental problems       Past Medical/Surgical/Family/Social History       The following have been reviewed and updated as appropriate in this visit:          Past Medical History:   Diagnosis Date    Bladder cancer (CMS/HCC)     Cerebral cavernoma     Constipation     History of tinnitus     Osteoporosis        Past Surgical History   Procedure Laterality Date    Bunionectomy Bilateral     Rectal prolapse repair      Subtotal colectomy      Tonsillectomy         Family History   Problem Relation Name Age of Onset    Diverticulitis Biological Mother      Glaucoma Biological Father         Social History     Tobacco Use    Smoking status: Never    Smokeless tobacco: Never   Substance Use Topics    Alcohol use: Yes     Comment: events    Drug use: Never       Patient Care Team:  Dinorah Tolentino MD as PCP - General (Internal Medicine)  Marva aDmon MD as Consulting Physician (Neurology)     Allergies and Medications       Allergies   Allergen Reactions    Sulfa (Sulfonamide Antibiotics)      Other reaction(s): Unknown       Current Outpatient Medications   Medication Sig Dispense Refill    alendronate 70 mg tablet Take 70 mg by mouth once a week.      aspirin 81 mg enteric coated tablet Take 81 mg by mouth daily.      cholecalciferol, vitamin D3, 10 mcg (400 unit) capsule Take by mouth.      clobetasoL (TEMOVATE) 0.05 % cream Apply topically 2 (two) times a day. 45 g 1     estradioL (ESTRACE) 0.01 % (0.1 mg/gram) vaginal cream Apply 1 g into the vagina three times per week at bedtime 127.5 g 3    fluticasone propionate (FLONASE) 50 mcg/actuation nasal spray Administer 1 spray into each nostril daily. 16 g 1    gabapentin (NEURONTIN) 100 mg capsule Take 1 capsule (100 mg total) by mouth nightly.      lidocaine (LIDODERM) 5 % patch APPLY 1 PATCH ON THE SKIN DAILY REMOVE & DISCARD WITHIN 12 HOURS OR AS DIRECTED BY PRESCRIBER 30 patch 1    MOTEGRITY 2 mg tablet Take 2 mg by mouth once daily.      multivit with minerals/lutein (MULTIVITAMIN 50 PLUS ORAL) Take by mouth.      naproxen (NAPROSYN) 500 mg tablet Take 1 tablet (500 mg total) by mouth 2 (two) times a day with meals. 30 tablet 0    polyethylene glycol (MIRALAX) 17 gram/dose powder Take 17 g by mouth daily. prn 510 g      No current facility-administered medications for this visit.          Review of Systems       Review of Systems   Constitutional:  Negative for fever.   HENT:  Negative for hearing loss.    Eyes:  Negative for visual disturbance.   Respiratory:  Negative for chest tightness and shortness of breath.    Cardiovascular:  Negative for chest pain and palpitations.   Gastrointestinal:  Negative for abdominal pain, constipation and diarrhea.   Genitourinary:  Negative for frequency.   Skin:  Negative for rash.   Neurological:  Negative for headaches.   Hematological:  Negative for adenopathy.   Psychiatric/Behavioral:  Negative for dysphoric mood. The patient is not nervous/anxious.         Physical Examination       Objective     Vitals:    03/18/25 1137   BP: 98/64   Pulse: (!) 55   Resp: 16   SpO2: 98%       Pulse Readings from Last 5 Encounters:   03/18/25 (!) 55   02/13/25 69   06/27/24 (!) 53   04/11/24 72   01/08/24 70       Wt Readings from Last 5 Encounters:   03/18/25 38.1 kg (84 lb)   02/18/25 41 kg (90 lb 6.4 oz)   06/27/24 43.4 kg (95 lb 9.6 oz)   04/11/24 44.5 kg (98 lb)   01/08/24 44.5 kg (98 lb)       Ht  "Readings from Last 5 Encounters:   03/18/25 1.651 m (5' 5\")   02/18/25 1.651 m (5' 5\")   06/27/24 1.651 m (5' 5\")   04/11/24 1.651 m (5' 5\")   01/08/24 1.651 m (5' 5\")       BP Readings from Last 5 Encounters:   03/18/25 98/64   02/18/25 108/82   02/13/25 92/68   06/27/24 (!) 98/58   04/11/24 102/64       BMI Readings from Last 4 Encounters:   03/18/25 13.98 kg/m²   02/18/25 15.04 kg/m²   06/27/24 15.91 kg/m²   04/11/24 16.31 kg/m²       Physical Exam  Constitutional:       Appearance: Normal appearance.   HENT:      Right Ear: Tympanic membrane, ear canal and external ear normal.      Left Ear: Tympanic membrane, ear canal and external ear normal.      Nose: Nose normal.      Mouth/Throat:      Mouth: Mucous membranes are moist.   Eyes:      Conjunctiva/sclera: Conjunctivae normal.   Neck:      Thyroid: No thyroid mass or thyromegaly.   Cardiovascular:      Rate and Rhythm: Normal rate and regular rhythm.      Heart sounds: Normal heart sounds.   Pulmonary:      Effort: Pulmonary effort is normal.      Breath sounds: Normal breath sounds.   Abdominal:      General: Bowel sounds are normal.      Palpations: Abdomen is soft.      Tenderness: There is no abdominal tenderness.   Musculoskeletal:      Cervical back: Normal range of motion.   Skin:     Findings: No rash.   Neurological:      Mental Status: She is alert.   Psychiatric:         Behavior: Behavior normal.         Thought Content: Thought content normal.          Laboratory Results     Lab Results   Component Value Date    WBC 4.90 04/17/2021    WBC 8.45 04/16/2021    WBC 6.02 09/18/2020    HGB 13.1 04/17/2021    HGB 14.4 04/16/2021    HGB 13.0 09/18/2020    HCT 38.3 04/17/2021    HCT 40.3 04/16/2021    HCT 38.5 09/18/2020    MCV 94.8 04/17/2021    MCV 91.4 04/16/2021    MCV 99.7 (H) 09/18/2020     04/17/2021     04/16/2021     09/18/2020        Lab Results   Component Value Date    GLUCOSE 87 04/17/2021    GLUCOSE 91 04/17/2021    " "GLUCOSE 88 04/16/2021    CALCIUM 9.0 04/17/2021    CALCIUM 9.3 04/17/2021    CALCIUM 9.0 04/16/2021     (L) 04/17/2021     04/17/2021     (L) 04/16/2021    K 4.3 04/17/2021    K 4.4 04/17/2021    K 3.9 04/16/2021    CO2 26 04/17/2021    CO2 28 04/17/2021    CO2 25 04/16/2021     04/17/2021     04/17/2021     04/16/2021    BUN 15 04/17/2021    BUN 12 04/17/2021    BUN 15 04/16/2021    CREATININE 0.6 04/17/2021    CREATININE 0.5 (L) 04/17/2021    CREATININE 0.5 (L) 04/16/2021    ALKPHOS 70 04/16/2021    ALKPHOS 51 09/17/2017    ALKPHOS 67 03/16/2017    AST 38 04/16/2021    AST 31 09/17/2017    AST 32 03/16/2017    ALT 30 04/16/2021    ALT 26 09/17/2017    ALT 25 03/16/2017    BILITOT 0.6 04/16/2021    BILITOT 0.6 09/17/2017    BILITOT 0.4 03/16/2017    ALBUMIN 4.8 04/16/2021    ALBUMIN 4.5 09/17/2017    ALBUMIN 4.4 03/16/2017       Lab Results   Component Value Date    CHOL 193 09/22/2022    CHOL 191 04/17/2021    CHOL 209 (H) 09/18/2017     Lab Results   Component Value Date    HDL 98 09/22/2022    HDL 71 04/17/2021    HDL 81 09/18/2017     Lab Results   Component Value Date    LDLCALC 86 09/22/2022    LDLCALC 113 (H) 04/17/2021    LDLCALC 117 (H) 09/18/2017     Lab Results   Component Value Date    TRIG 44 09/22/2022    TRIG 37 04/17/2021    TRIG 54 09/18/2017       The 10-year ASCVD risk score (Renee BNUN, et al., 2019) is: 3.4%    Values used to calculate the score:      Age: 69 years      Sex: Female      Is Non- : No      Diabetic: No      Tobacco smoker: No      Systolic Blood Pressure: 98 mmHg      Is BP treated: No      HDL Cholesterol: 98 mg/dL      Total Cholesterol: 193 mg/dL    Lab Results   Component Value Date    TSH 4.56 04/16/2021    TSH 2.34 01/04/2021    TSH 1.69 09/17/2017     No results found for: \"FREET4\"      Lab Results   Component Value Date    HGBA1C 4.8 04/17/2021       No results found for: \"CREATUR\"  No results found for: " "\"MICROALBUR\"  No results found for: \"ALBCRET\"    No results found for: \"HEPCAB\"    Lab Results   Component Value Date    MG 2.0 09/17/2017            Immunization History   Administered Date(s) Administered    Hepatitis B 09/10/2021, 10/10/2021    INFLUENZA VACCINE QUAD ADJUVANTED 65 and OLDER 12/02/2022, 11/28/2023    Influenza Trivalent High Dose Preservative Free 65 yrs and up IM 03/18/2025    Influenza Vaccine Quadrivalent 3 Yr And Older 11/01/2016    Influenza Vaccine Quadrivalent Preservative Free 6 Mons and Up 11/08/2019    Influenza, Unspecified 11/01/2016, 11/15/2016, 11/28/2023    Pneumococcal Conjugate 13-Valent 09/27/2021    Pneumococcal Polysaccharide 10/02/2022    SARS-COV-2 (COVID-19) Unspecified Immunization 03/22/2021    Sars-cov-2 (Covid-19) Vaccine, Fabio 04/07/2020, 03/22/2021    Tdap 08/26/2015         Health Maintenance Topics with due status: Overdue       Topic Date Due    Advance Care Planning Never done    Colorectal Cancer Screening Never done    Zoster Vaccine Never done    RSV Vaccine Never done    Hepatitis B Vaccines 03/10/2022    Falls Risk Screening Never done    COVID-19 Vaccine 09/01/2024    Breast Cancer Screening 02/22/2025     Health Maintenance Topics with due status: Not Due       Topic Last Completion Date    DTaP, Tdap, and Td Vaccines 08/26/2015    DEXA Scan 02/22/2024    Depression Screening 03/18/2025     Health Maintenance Topics with due status: Completed       Topic Last Completion Date    Pneumococcal (50 years of age and older) 10/02/2022    Influenza Vaccine 03/18/2025     Health Maintenance Topics with due status: Aged Out       Topic Date Due    Meningococcal ACWY Aged Out    RSV <20 months Aged Out    HIB Vaccines Aged Out    IPV Vaccines Aged Out    Meningococcal B Aged Out    HPV Vaccines Aged Out        Assessment and Plan       Assessment/Plan     Problem List Items Addressed This Visit       Encounter for general adult medical examination without abnormal " findings - Primary    Current Assessment & Plan   - Exercise:  Nothing intentional.  Taking care of two grandchildren.  Goes to [place], doing exercises with a PT.  Discussed importance of keeping up strength and muscle mass  - Diet:  some limitations as she has tried different diets for her GI symptoms.  Doing better now but has painful dentures and mouth sores.  Difficult to eat solids. Declines further nutritionists.  Discussed calorie dense foods, protein shakes.  - Alcohol use: none  - Smoking: none  - Drugs: none  - Mental health: stress but mood is overall good  - No falls in the last year-balance is fine  - Cognition:  No concerns  - Dental Provider: 2x per year, Dr. Huff,  - Opth provider: [facility], [facility], Dr. Roger  - Skin/Derm:  discussed ABCDEs of mole surveillance and sunscreen > 30 SPF.  Skin checks regularly.  [facility] in [place]  - Gyn/mmgm/pap: Normal paps in the past, last one was in 2017, Mammo 2/2024, new order placed   - dexa (women aged 65 years and older and in younger women whose fracture risk is equal to or greater than that of a 65-year old): 2/2024.  Osteoporosis.  Treatment with Dr. Esquivel.  Fosamax.  - Last [place]:  3/2022, due in 10 years.  Seeing GI in April  - The USPSTF recommends screening for hepatitis C virus (HCV) infection in adults aged 18 to 79 years:  Treated with antivirals in 2021 with SVR  - Tdap: 2015  - Pneumonia: UTD, last 2022  - Shingles: advised to get at pharmacy  - RSV:  advised at the pharmacy  - COVID Vaccine:  Declines 2024  - Flu shot: Given today  - Adv Directives: did not discuss today  - Cholesterol and glucose: given new script         Relevant Orders    Ambulatory referral to Dermatology    Severe protein-calorie malnutrition (CMS/HCC)    Current Assessment & Plan   6 pound weight loss within the last month.  She reports difficulties with dentures and ulcers in her mouth leading to be difficult to chew.  She has history of  gastrointestinal diagnoses make it hard to digest certain foods.  I discussed my concerns with her.  She has seen nutritionist multiple times and feels they are not helpful.  I advised doing a CT chest abdomen and pelvis given this extreme weight loss for BMI and she declines-expressed concern for cancer.  I advised following up in another month for a weight check but she declines.  She is willing to see me in 2 months.  I stressed that I am worried about illness in the setting of this very low BMI and stressed the importance of calorie dense foods.  Encouraged protein shakes, oatmeal mixed with not better etc.          Other Visit Diagnoses         Encounter for screening mammogram for malignant neoplasm of breast        Relevant Orders    BI SCREENING MAMMOGRAM BILATERAL(TOMOSYNTHESIS)            Return in about 2 months (around 5/18/2025), or weight, for weight.    Orders Placed This Encounter   Procedures    BI SCREENING MAMMOGRAM BILATERAL(TOMOSYNTHESIS)     Standing Status:   Future     Expected Date:   3/18/2025     Expiration Date:   5/18/2026     Release to patient:   Immediate [1]    Influenza vaccine high dose trivalent (> 65 yrs old) (FLUZONE High Dose) IM    Ambulatory referral to Dermatology     Standing Status:   Future     Expiration Date:   9/18/2025     Referral Priority:   Routine     Referral Type:   Consultation     Referral Reason:   Specialty Services Required     Referred to Provider:   Siria Clements MD     Requested Specialty:   Dermatology     Number of Visits Requested:   1              Dinorah Tolentino MD    3/18/2025

## 2025-03-18 NOTE — ASSESSMENT & PLAN NOTE
- Exercise:  Nothing intentional.  Taking care of two grandchildren.  Goes to [place], doing exercises with a PT.  Discussed importance of keeping up strength and muscle mass  - Diet:  some limitations as she has tried different diets for her GI symptoms.  Doing better now but has painful dentures and mouth sores.  Difficult to eat solids. Declines further nutritionists.  Discussed calorie dense foods, protein shakes.  - Alcohol use: none  - Smoking: none  - Drugs: none  - Mental health: stress but mood is overall good  - No falls in the last year-balance is fine  - Cognition:  No concerns  - Dental Provider: 2x per year, Dr. Huff,  - Opth provider: New contacts, [facility], Dr. Roger  - Skin/Derm:  discussed ABCDEs of mole surveillance and sunscreen > 30 SPF.  Skin checks regularly.  [facility] in [place]  - Gyn/mmgm/pap: Normal paps in the past, last one was in 2017, Mammo 2/2024, new order placed   - dexa (women aged 65 years and older and in younger women whose fracture risk is equal to or greater than that of a 65-year old): 2/2024.  Osteoporosis.  Treatment with Dr. Esquivel.  Fosamax.  - Last [place]:  3/2022, due in 10 years.  Seeing GI in April  - The USPSTF recommends screening for hepatitis C virus (HCV) infection in adults aged 18 to 79 years:  Treated with antivirals in 2021 with SVR  - Tdap: 2015  - Pneumonia: UTD, last 2022  - Shingles: advised to get at pharmacy  - RSV:  advised at the pharmacy  - COVID Vaccine:  Declines 2024  - Flu shot: Given today  - Adv Directives: did not discuss today  - Cholesterol and glucose: given new script

## 2025-03-18 NOTE — TELEPHONE ENCOUNTER
Medicine Refill Request    Last Office Visit: 3/18/2025   Last Consult Visit: 2/2/2023  Last Telemedicine Visit: Visit date not found    Next Appointment: 5/22/2025      Current Outpatient Medications:     alendronate 70 mg tablet, Take 70 mg by mouth once a week., Disp: , Rfl:     aspirin 81 mg enteric coated tablet, Take 81 mg by mouth daily., Disp: , Rfl:     cholecalciferol, vitamin D3, 10 mcg (400 unit) capsule, Take by mouth., Disp: , Rfl:     clobetasoL (TEMOVATE) 0.05 % cream, Apply topically 2 (two) times a day., Disp: 45 g, Rfl: 1    estradioL (ESTRACE) 0.01 % (0.1 mg/gram) vaginal cream, Apply 1 g into the vagina three times per week at bedtime, Disp: 127.5 g, Rfl: 3    fluticasone propionate (FLONASE) 50 mcg/actuation nasal spray, Administer 1 spray into each nostril daily., Disp: 16 g, Rfl: 1    gabapentin (NEURONTIN) 100 mg capsule, Take 1 capsule (100 mg total) by mouth nightly., Disp: , Rfl:     lidocaine (LIDODERM) 5 % patch, APPLY 1 PATCH ON THE SKIN DAILY REMOVE & DISCARD WITHIN 12 HOURS OR AS DIRECTED BY PRESCRIBER, Disp: 30 patch, Rfl: 1    MOTEGRITY 2 mg tablet, Take 2 mg by mouth once daily., Disp: , Rfl:     multivit with minerals/lutein (MULTIVITAMIN 50 PLUS ORAL), Take by mouth., Disp: , Rfl:     naproxen (NAPROSYN) 500 mg tablet, Take 1 tablet (500 mg total) by mouth 2 (two) times a day with meals., Disp: 30 tablet, Rfl: 0    polyethylene glycol (MIRALAX) 17 gram/dose powder, Take 17 g by mouth daily. prn, Disp: 510 g, Rfl:     BP Readings from Last 3 Encounters:   03/18/25 98/64   02/18/25 108/82   02/13/25 92/68       Recent Lab results:  Lab Results   Component Value Date    CHOL 193 09/22/2022   ,   Lab Results   Component Value Date    HDL 98 09/22/2022   ,   Lab Results   Component Value Date    LDLCALC 86 09/22/2022   ,   Lab Results   Component Value Date    TRIG 44 09/22/2022        Lab Results   Component Value Date    GLUCOSE 87 04/17/2021   ,   Lab Results   Component Value  Date    HGBA1C 4.8 04/17/2021         Lab Results   Component Value Date    CREATININE 0.6 04/17/2021       Lab Results   Component Value Date    TSH 4.56 04/16/2021           Lab Results   Component Value Date    HGBA1C 4.8 04/17/2021

## 2025-04-03 ENCOUNTER — TELEPHONE (OUTPATIENT)
Dept: PRIMARY CARE | Facility: CLINIC | Age: 70
End: 2025-04-03
Payer: COMMERCIAL

## 2025-04-03 NOTE — TELEPHONE ENCOUNTER
Pt still has ear infection. She stated none of the antibiotics that she was given has helped. She's requesting a call

## 2025-04-16 RX ORDER — NAPROXEN 500 MG/1
500 TABLET ORAL
Qty: 30 TABLET | Refills: 0 | Status: SHIPPED | OUTPATIENT
Start: 2025-04-16

## 2025-04-24 ENCOUNTER — TELEPHONE (OUTPATIENT)
Dept: PRIMARY CARE | Facility: CLINIC | Age: 70
End: 2025-04-24
Payer: COMMERCIAL

## 2025-04-24 NOTE — TELEPHONE ENCOUNTER
I received a message from patient's dermatologist that she had concerns regarding patient's symptoms.  Can we see if Nohemy can come in for an earlier visit than what we are scheduled for on 5/22?  Ideally in the next couple of weeks.  Thx!

## 2025-04-24 NOTE — TELEPHONE ENCOUNTER
Pt would like to know why is it ,that know one reached out from Dermatology ? PT wants to no what's going on,would like a callback before moving her 05/22 O\V

## 2025-04-25 NOTE — TELEPHONE ENCOUNTER
Left Vm with patient informing her Dr. HENLEY did receive message from there dermatologist and we want her to schedule an earlier appt within the next 1-2 weeks. Asked her to call back to schedule.

## 2025-05-01 ENCOUNTER — RESULTS FOLLOW-UP (OUTPATIENT)
Dept: PRIMARY CARE | Facility: CLINIC | Age: 70
End: 2025-05-01

## 2025-05-01 ENCOUNTER — TELEPHONE (OUTPATIENT)
Dept: PRIMARY CARE | Facility: CLINIC | Age: 70
End: 2025-05-01

## 2025-05-01 ENCOUNTER — OFFICE VISIT (OUTPATIENT)
Dept: PRIMARY CARE | Facility: CLINIC | Age: 70
End: 2025-05-01
Payer: COMMERCIAL

## 2025-05-01 ENCOUNTER — HOSPITAL ENCOUNTER (OUTPATIENT)
Dept: RADIOLOGY | Facility: CLINIC | Age: 70
Discharge: HOME | End: 2025-05-01
Attending: INTERNAL MEDICINE
Payer: COMMERCIAL

## 2025-05-01 VITALS
SYSTOLIC BLOOD PRESSURE: 108 MMHG | DIASTOLIC BLOOD PRESSURE: 78 MMHG | HEART RATE: 79 BPM | HEIGHT: 65 IN | OXYGEN SATURATION: 85 % | BODY MASS INDEX: 13.98 KG/M2

## 2025-05-01 DIAGNOSIS — Z86.19 HISTORY OF HEPATITIS C: ICD-10-CM
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
## 2025-05-01 DIAGNOSIS — E43 SEVERE PROTEIN-CALORIE MALNUTRITION (CMS/HCC): ICD-10-CM

## 2025-05-01 DIAGNOSIS — E46 MALNUTRITION, UNSPECIFIED TYPE (CMS/HCC): ICD-10-CM

## 2025-05-01 DIAGNOSIS — M25.551 RIGHT HIP PAIN: Primary | ICD-10-CM

## 2025-05-01 DIAGNOSIS — M25.551 RIGHT HIP PAIN: ICD-10-CM

## 2025-05-01 PROCEDURE — 73502 X-RAY EXAM HIP UNI 2-3 VIEWS: CPT | Mod: RT

## 2025-05-01 PROCEDURE — 99213 OFFICE O/P EST LOW 20 MIN: CPT | Performed by: INTERNAL MEDICINE

## 2025-05-01 PROCEDURE — 3008F BODY MASS INDEX DOCD: CPT | Performed by: INTERNAL MEDICINE

## 2025-05-02 ENCOUNTER — TELEPHONE (OUTPATIENT)
Dept: PRIMARY CARE | Facility: CLINIC | Age: 70
End: 2025-05-02
Payer: COMMERCIAL

## 2025-05-02 RX ORDER — LIDOCAINE 50 MG/G
PATCH TOPICAL
Qty: 60 PATCH | Refills: 1 | Status: SHIPPED | OUTPATIENT
Start: 2025-05-02

## 2025-08-11 ENCOUNTER — TELEPHONE (OUTPATIENT)
Dept: OTOLARYNGOLOGY | Facility: CLINIC | Age: 70
End: 2025-08-11
Payer: COMMERCIAL

## 2025-08-12 ENCOUNTER — OFFICE VISIT (OUTPATIENT)
Dept: OTOLARYNGOLOGY | Facility: CLINIC | Age: 70
End: 2025-08-12
Payer: COMMERCIAL

## 2025-08-12 ENCOUNTER — PROCEDURE VISIT (OUTPATIENT)
Dept: OTOLARYNGOLOGY | Facility: CLINIC | Age: 70
End: 2025-08-12
Payer: COMMERCIAL

## 2025-08-12 VITALS
WEIGHT: 110 LBS | BODY MASS INDEX: 18.33 KG/M2 | DIASTOLIC BLOOD PRESSURE: 70 MMHG | HEART RATE: 56 BPM | HEIGHT: 65 IN | OXYGEN SATURATION: 98 % | SYSTOLIC BLOOD PRESSURE: 100 MMHG

## 2025-08-12 DIAGNOSIS — H90.3 SENSORINEURAL HEARING LOSS (SNHL) OF BOTH EARS: ICD-10-CM

## 2025-08-12 DIAGNOSIS — H92.02 LEFT EAR PAIN: ICD-10-CM

## 2025-08-12 DIAGNOSIS — H93.13 TINNITUS OF BOTH EARS: Primary | ICD-10-CM

## 2025-08-12 DIAGNOSIS — H90.3 SENSORINEURAL HEARING LOSS (SNHL) OF BOTH EARS: Primary | ICD-10-CM

## 2025-08-12 DIAGNOSIS — G89.29 CHRONIC NECK PAIN: ICD-10-CM

## 2025-08-12 DIAGNOSIS — M54.2 CHRONIC NECK PAIN: ICD-10-CM

## 2025-08-12 DIAGNOSIS — M26.623 BILATERAL TEMPOROMANDIBULAR JOINT PAIN: ICD-10-CM

## 2025-08-12 PROCEDURE — 92567 TYMPANOMETRY: CPT | Performed by: AUDIOLOGIST

## 2025-08-12 PROCEDURE — 99999 PR OFFICE/OUTPT VISIT,PROCEDURE ONLY: CPT | Performed by: AUDIOLOGIST

## 2025-08-12 PROCEDURE — 92557 COMPREHENSIVE HEARING TEST: CPT | Performed by: AUDIOLOGIST

## 2025-08-12 RX ORDER — ZOLEDRONIC ACID 5 MG/100ML
5 INJECTION, SOLUTION INTRAVENOUS ONCE
COMMUNITY
Start: 2025-07-13